# Patient Record
Sex: MALE | Race: WHITE
[De-identification: names, ages, dates, MRNs, and addresses within clinical notes are randomized per-mention and may not be internally consistent; named-entity substitution may affect disease eponyms.]

---

## 2018-06-13 ENCOUNTER — HOSPITAL ENCOUNTER (INPATIENT)
Dept: HOSPITAL 8 - ED | Age: 53
LOS: 2 days | Discharge: HOME | DRG: 917 | End: 2018-06-15
Attending: INTERNAL MEDICINE | Admitting: INTERNAL MEDICINE
Payer: COMMERCIAL

## 2018-06-13 VITALS — HEIGHT: 73 IN | WEIGHT: 162.48 LBS | BODY MASS INDEX: 21.53 KG/M2

## 2018-06-13 DIAGNOSIS — J96.01: ICD-10-CM

## 2018-06-13 DIAGNOSIS — R00.0: ICD-10-CM

## 2018-06-13 DIAGNOSIS — D72.829: ICD-10-CM

## 2018-06-13 DIAGNOSIS — G89.4: ICD-10-CM

## 2018-06-13 DIAGNOSIS — G92: ICD-10-CM

## 2018-06-13 DIAGNOSIS — F41.9: ICD-10-CM

## 2018-06-13 DIAGNOSIS — F11.20: ICD-10-CM

## 2018-06-13 DIAGNOSIS — F17.210: ICD-10-CM

## 2018-06-13 DIAGNOSIS — Z88.0: ICD-10-CM

## 2018-06-13 DIAGNOSIS — T40.2X1A: Primary | ICD-10-CM

## 2018-06-13 DIAGNOSIS — M54.5: ICD-10-CM

## 2018-06-13 LAB
ALBUMIN SERPL-MCNC: 3.7 G/DL (ref 3.4–5)
ALT SERPL-CCNC: 32 U/L (ref 12–78)
ANION GAP SERPL CALC-SCNC: 8 MMOL/L (ref 5–15)
BASOPHILS # BLD AUTO: 0.05 X10^3/UL (ref 0–0.1)
BASOPHILS NFR BLD AUTO: 0 % (ref 0–1)
CALCIUM SERPL-MCNC: 9.2 MG/DL (ref 8.5–10.1)
CHLORIDE SERPL-SCNC: 106 MMOL/L (ref 98–107)
CREAT SERPL-MCNC: 0.75 MG/DL (ref 0.7–1.3)
EOSINOPHIL # BLD AUTO: 0.08 X10^3/UL (ref 0–0.4)
EOSINOPHIL NFR BLD AUTO: 1 % (ref 1–7)
ERYTHROCYTE [DISTWIDTH] IN BLOOD BY AUTOMATED COUNT: 13 % (ref 9.4–14.8)
LYMPHOCYTES # BLD AUTO: 3.18 X10^3/UL (ref 1–3.4)
LYMPHOCYTES NFR BLD AUTO: 22 % (ref 22–44)
MCH RBC QN AUTO: 31.1 PG (ref 27.5–34.5)
MCHC RBC AUTO-ENTMCNC: 33.8 G/DL (ref 33.2–36.2)
MCV RBC AUTO: 92.1 FL (ref 81–97)
MD: NO
MONOCYTES # BLD AUTO: 0.79 X10^3/UL (ref 0.2–0.8)
MONOCYTES NFR BLD AUTO: 6 % (ref 2–9)
NEUTROPHILS # BLD AUTO: 10.16 X10^3/UL (ref 1.8–6.8)
NEUTROPHILS NFR BLD AUTO: 71 % (ref 42–75)
PLATELET # BLD AUTO: 210 X10^3/UL (ref 130–400)
PMV BLD AUTO: 7.5 FL (ref 7.4–10.4)
RBC # BLD AUTO: 4.71 X10^6/UL (ref 4.38–5.82)
VANCOMYCIN TROUGH SERPL-MCNC: 6.2 MG/DL (ref 2.8–20)

## 2018-06-13 PROCEDURE — 80329 ANALGESICS NON-OPIOID 1 OR 2: CPT

## 2018-06-13 PROCEDURE — 93005 ELECTROCARDIOGRAM TRACING: CPT

## 2018-06-13 PROCEDURE — 99291 CRITICAL CARE FIRST HOUR: CPT

## 2018-06-13 PROCEDURE — 82962 GLUCOSE BLOOD TEST: CPT

## 2018-06-13 PROCEDURE — 85025 COMPLETE CBC W/AUTO DIFF WBC: CPT

## 2018-06-13 PROCEDURE — 81003 URINALYSIS AUTO W/O SCOPE: CPT

## 2018-06-13 PROCEDURE — 87081 CULTURE SCREEN ONLY: CPT

## 2018-06-13 PROCEDURE — 71045 X-RAY EXAM CHEST 1 VIEW: CPT

## 2018-06-13 PROCEDURE — 80053 COMPREHEN METABOLIC PANEL: CPT

## 2018-06-13 PROCEDURE — 36415 COLL VENOUS BLD VENIPUNCTURE: CPT

## 2018-06-13 PROCEDURE — 80307 DRUG TEST PRSMV CHEM ANLYZR: CPT

## 2018-06-13 PROCEDURE — G0480 DRUG TEST DEF 1-7 CLASSES: HCPCS

## 2018-06-14 VITALS — DIASTOLIC BLOOD PRESSURE: 58 MMHG | SYSTOLIC BLOOD PRESSURE: 96 MMHG

## 2018-06-14 LAB
ALP SERPL-CCNC: 63 U/L (ref 45–117)
APAP SERPL-MCNC: < 2 MCG/ML (ref 10–30)
BILIRUB SERPL-MCNC: 0.3 MG/DL (ref 0.2–1)
CULTURE INDICATED?: NO
MICROSCOPIC: (no result)
PROT SERPL-MCNC: 6.7 G/DL (ref 6.4–8.2)

## 2018-06-14 RX ADMIN — NICOTINE SCH PATCH: 14 PATCH, EXTENDED RELEASE TRANSDERMAL at 23:01

## 2018-06-14 RX ADMIN — NICOTINE SCH PATCH: 14 PATCH, EXTENDED RELEASE TRANSDERMAL at 02:34

## 2018-06-15 VITALS — SYSTOLIC BLOOD PRESSURE: 135 MMHG | DIASTOLIC BLOOD PRESSURE: 75 MMHG

## 2018-06-15 VITALS — DIASTOLIC BLOOD PRESSURE: 62 MMHG | SYSTOLIC BLOOD PRESSURE: 125 MMHG

## 2018-06-15 VITALS — DIASTOLIC BLOOD PRESSURE: 67 MMHG | SYSTOLIC BLOOD PRESSURE: 146 MMHG

## 2018-06-15 VITALS — SYSTOLIC BLOOD PRESSURE: 125 MMHG | DIASTOLIC BLOOD PRESSURE: 83 MMHG

## 2018-06-15 LAB
ALBUMIN SERPL-MCNC: 3.6 G/DL (ref 3.4–5)
ALP SERPL-CCNC: 69 U/L (ref 45–117)
ALT SERPL-CCNC: 31 U/L (ref 12–78)
ANION GAP SERPL CALC-SCNC: 6 MMOL/L (ref 5–15)
BASOPHILS # BLD AUTO: 0.02 X10^3/UL (ref 0–0.1)
BASOPHILS NFR BLD AUTO: 0 % (ref 0–1)
BILIRUB SERPL-MCNC: 0.6 MG/DL (ref 0.2–1)
CALCIUM SERPL-MCNC: 9.1 MG/DL (ref 8.5–10.1)
CHLORIDE SERPL-SCNC: 110 MMOL/L (ref 98–107)
CREAT SERPL-MCNC: 0.62 MG/DL (ref 0.7–1.3)
EOSINOPHIL # BLD AUTO: 0.08 X10^3/UL (ref 0–0.4)
EOSINOPHIL NFR BLD AUTO: 1 % (ref 1–7)
ERYTHROCYTE [DISTWIDTH] IN BLOOD BY AUTOMATED COUNT: 13.2 % (ref 9.4–14.8)
LYMPHOCYTES # BLD AUTO: 2.93 X10^3/UL (ref 1–3.4)
LYMPHOCYTES NFR BLD AUTO: 40 % (ref 22–44)
MCH RBC QN AUTO: 31.2 PG (ref 27.5–34.5)
MCHC RBC AUTO-ENTMCNC: 33.6 G/DL (ref 33.2–36.2)
MCV RBC AUTO: 92.8 FL (ref 81–97)
MD: NO
MONOCYTES # BLD AUTO: 0.37 X10^3/UL (ref 0.2–0.8)
MONOCYTES NFR BLD AUTO: 5 % (ref 2–9)
NEUTROPHILS # BLD AUTO: 3.94 X10^3/UL (ref 1.8–6.8)
NEUTROPHILS NFR BLD AUTO: 54 % (ref 42–75)
PLATELET # BLD AUTO: 184 X10^3/UL (ref 130–400)
PMV BLD AUTO: 8.2 FL (ref 7.4–10.4)
PROT SERPL-MCNC: 6.6 G/DL (ref 6.4–8.2)
RBC # BLD AUTO: 4.76 X10^6/UL (ref 4.38–5.82)

## 2018-12-23 ENCOUNTER — HOSPITAL ENCOUNTER (INPATIENT)
Dept: HOSPITAL 8 - ED | Age: 53
LOS: 4 days | Discharge: HOME | DRG: 377 | End: 2018-12-27
Attending: HOSPITALIST | Admitting: HOSPITALIST
Payer: COMMERCIAL

## 2018-12-23 VITALS — BODY MASS INDEX: 19.65 KG/M2 | HEIGHT: 72 IN | WEIGHT: 145.06 LBS

## 2018-12-23 VITALS — SYSTOLIC BLOOD PRESSURE: 106 MMHG | DIASTOLIC BLOOD PRESSURE: 66 MMHG

## 2018-12-23 VITALS — DIASTOLIC BLOOD PRESSURE: 61 MMHG | SYSTOLIC BLOOD PRESSURE: 136 MMHG

## 2018-12-23 VITALS — SYSTOLIC BLOOD PRESSURE: 71 MMHG | DIASTOLIC BLOOD PRESSURE: 38 MMHG

## 2018-12-23 VITALS — DIASTOLIC BLOOD PRESSURE: 82 MMHG | SYSTOLIC BLOOD PRESSURE: 115 MMHG

## 2018-12-23 VITALS — DIASTOLIC BLOOD PRESSURE: 58 MMHG | SYSTOLIC BLOOD PRESSURE: 114 MMHG

## 2018-12-23 DIAGNOSIS — Z79.84: ICD-10-CM

## 2018-12-23 DIAGNOSIS — F17.210: ICD-10-CM

## 2018-12-23 DIAGNOSIS — G89.29: ICD-10-CM

## 2018-12-23 DIAGNOSIS — E87.6: ICD-10-CM

## 2018-12-23 DIAGNOSIS — Z78.9: ICD-10-CM

## 2018-12-23 DIAGNOSIS — R57.8: ICD-10-CM

## 2018-12-23 DIAGNOSIS — Z79.899: ICD-10-CM

## 2018-12-23 DIAGNOSIS — M54.9: ICD-10-CM
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
## 2018-12-23 DIAGNOSIS — F11.20: ICD-10-CM

## 2018-12-23 DIAGNOSIS — R57.1: ICD-10-CM

## 2018-12-23 DIAGNOSIS — Z82.3: ICD-10-CM

## 2018-12-23 DIAGNOSIS — E87.2: ICD-10-CM

## 2018-12-23 DIAGNOSIS — T39.395A: ICD-10-CM

## 2018-12-23 DIAGNOSIS — Z79.82: ICD-10-CM

## 2018-12-23 DIAGNOSIS — J44.9: ICD-10-CM

## 2018-12-23 DIAGNOSIS — K29.80: ICD-10-CM

## 2018-12-23 DIAGNOSIS — I10: ICD-10-CM

## 2018-12-23 DIAGNOSIS — K26.4: Primary | ICD-10-CM

## 2018-12-23 DIAGNOSIS — Z80.1: ICD-10-CM

## 2018-12-23 DIAGNOSIS — D62: ICD-10-CM

## 2018-12-23 DIAGNOSIS — E11.9: ICD-10-CM

## 2018-12-23 DIAGNOSIS — Z88.0: ICD-10-CM

## 2018-12-23 DIAGNOSIS — E43: ICD-10-CM

## 2018-12-23 DIAGNOSIS — E83.42: ICD-10-CM

## 2018-12-23 DIAGNOSIS — E78.5: ICD-10-CM

## 2018-12-23 LAB
ALBUMIN SERPL-MCNC: 2.9 G/DL (ref 3.4–5)
ALP SERPL-CCNC: 51 U/L (ref 45–117)
ALT SERPL-CCNC: 37 U/L (ref 12–78)
ANION GAP SERPL CALC-SCNC: 8 MMOL/L (ref 5–15)
APTT BLD: 25 SECONDS (ref 25–31)
BASOPHILS # BLD AUTO: 0.03 X10^3/UL (ref 0–0.1)
BASOPHILS NFR BLD AUTO: 0 % (ref 0–1)
BILIRUB SERPL-MCNC: 0.7 MG/DL (ref 0.2–1)
CALCIUM SERPL-MCNC: 8.1 MG/DL (ref 8.5–10.1)
CHLORIDE SERPL-SCNC: 107 MMOL/L (ref 98–107)
CLOSTRIDIUM DIFFICILE ANTIGEN: NEGATIVE
CLOSTRIDIUM DIFFICILE TOXIN: NEGATIVE
CREAT SERPL-MCNC: 0.74 MG/DL (ref 0.7–1.3)
CULTURE INDICATED?: NO
EOSINOPHIL # BLD AUTO: 0 X10^3/UL (ref 0–0.4)
EOSINOPHIL NFR BLD AUTO: 0 % (ref 1–7)
ERYTHROCYTE [DISTWIDTH] IN BLOOD BY AUTOMATED COUNT: 13.1 % (ref 9.4–14.8)
INR PPP: 1 (ref 0.93–1.1)
LYMPHOCYTES # BLD AUTO: 3.73 X10^3/UL (ref 1–3.4)
LYMPHOCYTES NFR BLD AUTO: 23 % (ref 22–44)
MCH RBC QN AUTO: 33 PG (ref 27.5–34.5)
MCHC RBC AUTO-ENTMCNC: 34.3 G/DL (ref 33.2–36.2)
MCV RBC AUTO: 96.3 FL (ref 81–97)
MD: NO
MICROSCOPIC: (no result)
MONOCYTES # BLD AUTO: 1.37 X10^3/UL (ref 0.2–0.8)
MONOCYTES NFR BLD AUTO: 8 % (ref 2–9)
NEUTROPHILS # BLD AUTO: 11.35 X10^3/UL (ref 1.8–6.8)
NEUTROPHILS NFR BLD AUTO: 69 % (ref 42–75)
PLATELET # BLD AUTO: 426 X10^3/UL (ref 130–400)
PMV BLD AUTO: 6.7 FL (ref 7.4–10.4)
PROT SERPL-MCNC: 5.2 G/DL (ref 6.4–8.2)
PROTHROMBIN TIME: 10.6 SECONDS (ref 9.6–11.5)
RBC # BLD AUTO: 3.45 X10^6/UL (ref 4.38–5.82)
TROPONIN I SERPL-MCNC: < 0.015 NG/ML (ref 0–0.04)

## 2018-12-23 PROCEDURE — 89055 LEUKOCYTE ASSESSMENT FECAL: CPT

## 2018-12-23 PROCEDURE — 85018 HEMOGLOBIN: CPT

## 2018-12-23 PROCEDURE — 96361 HYDRATE IV INFUSION ADD-ON: CPT

## 2018-12-23 PROCEDURE — 87324 CLOSTRIDIUM AG IA: CPT

## 2018-12-23 PROCEDURE — 71045 X-RAY EXAM CHEST 1 VIEW: CPT

## 2018-12-23 PROCEDURE — 83690 ASSAY OF LIPASE: CPT

## 2018-12-23 PROCEDURE — 93005 ELECTROCARDIOGRAM TRACING: CPT

## 2018-12-23 PROCEDURE — 0DJ08ZZ INSPECTION OF UPPER INTESTINAL TRACT, VIA NATURAL OR ARTIFICIAL OPENING ENDOSCOPIC: ICD-10-PCS | Performed by: INTERNAL MEDICINE

## 2018-12-23 PROCEDURE — 85025 COMPLETE CBC W/AUTO DIFF WBC: CPT

## 2018-12-23 PROCEDURE — 30233N1 TRANSFUSION OF NONAUTOLOGOUS RED BLOOD CELLS INTO PERIPHERAL VEIN, PERCUTANEOUS APPROACH: ICD-10-PCS | Performed by: INTERNAL MEDICINE

## 2018-12-23 PROCEDURE — P9016 RBC LEUKOCYTES REDUCED: HCPCS

## 2018-12-23 PROCEDURE — 87081 CULTURE SCREEN ONLY: CPT

## 2018-12-23 PROCEDURE — 96376 TX/PRO/DX INJ SAME DRUG ADON: CPT

## 2018-12-23 PROCEDURE — 36415 COLL VENOUS BLD VENIPUNCTURE: CPT

## 2018-12-23 PROCEDURE — 84484 ASSAY OF TROPONIN QUANT: CPT

## 2018-12-23 PROCEDURE — 86850 RBC ANTIBODY SCREEN: CPT

## 2018-12-23 PROCEDURE — 83735 ASSAY OF MAGNESIUM: CPT

## 2018-12-23 PROCEDURE — 81003 URINALYSIS AUTO W/O SCOPE: CPT

## 2018-12-23 PROCEDURE — 86900 BLOOD TYPING SEROLOGIC ABO: CPT

## 2018-12-23 PROCEDURE — 85730 THROMBOPLASTIN TIME PARTIAL: CPT

## 2018-12-23 PROCEDURE — 99285 EMERGENCY DEPT VISIT HI MDM: CPT

## 2018-12-23 PROCEDURE — 86923 COMPATIBILITY TEST ELECTRIC: CPT

## 2018-12-23 PROCEDURE — 86677 HELICOBACTER PYLORI ANTIBODY: CPT

## 2018-12-23 PROCEDURE — 85014 HEMATOCRIT: CPT

## 2018-12-23 PROCEDURE — 80048 BASIC METABOLIC PNL TOTAL CA: CPT

## 2018-12-23 PROCEDURE — 84100 ASSAY OF PHOSPHORUS: CPT

## 2018-12-23 PROCEDURE — C9113 INJ PANTOPRAZOLE SODIUM, VIA: HCPCS

## 2018-12-23 PROCEDURE — 36430 TRANSFUSION BLD/BLD COMPNT: CPT

## 2018-12-23 PROCEDURE — 85610 PROTHROMBIN TIME: CPT

## 2018-12-23 PROCEDURE — 74177 CT ABD & PELVIS W/CONTRAST: CPT

## 2018-12-23 PROCEDURE — 87040 BLOOD CULTURE FOR BACTERIA: CPT

## 2018-12-23 PROCEDURE — 80307 DRUG TEST PRSMV CHEM ANLYZR: CPT

## 2018-12-23 PROCEDURE — 80053 COMPREHEN METABOLIC PANEL: CPT

## 2018-12-23 PROCEDURE — 3E0G8GC INTRODUCTION OF OTHER THERAPEUTIC SUBSTANCE INTO UPPER GI, VIA NATURAL OR ARTIFICIAL OPENING ENDOSCOPIC: ICD-10-PCS | Performed by: INTERNAL MEDICINE

## 2018-12-23 PROCEDURE — 83605 ASSAY OF LACTIC ACID: CPT

## 2018-12-23 PROCEDURE — 96375 TX/PRO/DX INJ NEW DRUG ADDON: CPT

## 2018-12-23 PROCEDURE — 84145 PROCALCITONIN (PCT): CPT

## 2018-12-23 PROCEDURE — 96374 THER/PROPH/DIAG INJ IV PUSH: CPT

## 2018-12-23 RX ADMIN — MORPHINE SULFATE PRN MG: 4 INJECTION INTRAVENOUS at 14:48

## 2018-12-23 RX ADMIN — MORPHINE SULFATE PRN MG: 10 INJECTION INTRAVENOUS at 23:03

## 2018-12-23 RX ADMIN — PANTOPRAZOLE SODIUM SCH MLS/HR: 40 INJECTION, POWDER, FOR SOLUTION INTRAVENOUS at 19:38

## 2018-12-23 RX ADMIN — SODIUM CHLORIDE SCH MLS/HR: 0.9 INJECTION, SOLUTION INTRAVENOUS at 21:20

## 2018-12-23 RX ADMIN — MORPHINE SULFATE PRN MG: 4 INJECTION INTRAVENOUS at 20:34

## 2018-12-24 VITALS — DIASTOLIC BLOOD PRESSURE: 61 MMHG | SYSTOLIC BLOOD PRESSURE: 121 MMHG

## 2018-12-24 VITALS — SYSTOLIC BLOOD PRESSURE: 134 MMHG | DIASTOLIC BLOOD PRESSURE: 72 MMHG

## 2018-12-24 VITALS — SYSTOLIC BLOOD PRESSURE: 130 MMHG | DIASTOLIC BLOOD PRESSURE: 72 MMHG

## 2018-12-24 VITALS — DIASTOLIC BLOOD PRESSURE: 67 MMHG | SYSTOLIC BLOOD PRESSURE: 122 MMHG

## 2018-12-24 VITALS — SYSTOLIC BLOOD PRESSURE: 133 MMHG | DIASTOLIC BLOOD PRESSURE: 70 MMHG

## 2018-12-24 VITALS — SYSTOLIC BLOOD PRESSURE: 123 MMHG | DIASTOLIC BLOOD PRESSURE: 69 MMHG

## 2018-12-24 VITALS — DIASTOLIC BLOOD PRESSURE: 65 MMHG | SYSTOLIC BLOOD PRESSURE: 138 MMHG

## 2018-12-24 VITALS — DIASTOLIC BLOOD PRESSURE: 51 MMHG | SYSTOLIC BLOOD PRESSURE: 111 MMHG

## 2018-12-24 VITALS — SYSTOLIC BLOOD PRESSURE: 127 MMHG | DIASTOLIC BLOOD PRESSURE: 64 MMHG

## 2018-12-24 LAB
ALBUMIN SERPL-MCNC: 2.1 G/DL (ref 3.4–5)
ALP SERPL-CCNC: 29 U/L (ref 45–117)
ALT SERPL-CCNC: 20 U/L (ref 12–78)
ANION GAP SERPL CALC-SCNC: 6 MMOL/L (ref 5–15)
BASOPHILS # BLD AUTO: 0.02 X10^3/UL (ref 0–0.1)
BASOPHILS NFR BLD AUTO: 0 % (ref 0–1)
BILIRUB SERPL-MCNC: 0.5 MG/DL (ref 0.2–1)
CALCIUM SERPL-MCNC: 5.9 MG/DL (ref 8.5–10.1)
CHLORIDE SERPL-SCNC: 116 MMOL/L (ref 98–107)
CREAT SERPL-MCNC: 0.47 MG/DL (ref 0.7–1.3)
EOSINOPHIL # BLD AUTO: 0.19 X10^3/UL (ref 0–0.4)
EOSINOPHIL NFR BLD AUTO: 2 % (ref 1–7)
ERYTHROCYTE [DISTWIDTH] IN BLOOD BY AUTOMATED COUNT: 16 % (ref 9.4–14.8)
HEMOGRAM NOTE: (no result)
LYMPHOCYTES # BLD AUTO: 3.28 X10^3/UL (ref 1–3.4)
LYMPHOCYTES NFR BLD AUTO: 34 % (ref 22–44)
MCH RBC QN AUTO: 32 PG (ref 27.5–34.5)
MCHC RBC AUTO-ENTMCNC: 34.7 G/DL (ref 33.2–36.2)
MCV RBC AUTO: 92.2 FL (ref 81–97)
MD: (no result)
MONOCYTES # BLD AUTO: 0.83 X10^3/UL (ref 0.2–0.8)
MONOCYTES NFR BLD AUTO: 9 % (ref 2–9)
NEUTROPHILS # BLD AUTO: 5.28 X10^3/UL (ref 1.8–6.8)
NEUTROPHILS NFR BLD AUTO: 55 % (ref 42–75)
PLATELET # BLD AUTO: 201 X10^3/UL (ref 130–400)
PMV BLD AUTO: 6.9 FL (ref 7.4–10.4)
PROT SERPL-MCNC: 3.7 G/DL (ref 6.4–8.2)
RBC # BLD AUTO: 2.41 X10^6/UL (ref 4.38–5.82)

## 2018-12-24 RX ADMIN — PANTOPRAZOLE SODIUM SCH MLS/HR: 40 INJECTION, POWDER, FOR SOLUTION INTRAVENOUS at 04:58

## 2018-12-24 RX ADMIN — MORPHINE SULFATE PRN MG: 10 INJECTION INTRAVENOUS at 02:36

## 2018-12-24 RX ADMIN — OXYCODONE HYDROCHLORIDE PRN MG: 5 TABLET ORAL at 12:19

## 2018-12-24 RX ADMIN — OXYCODONE HYDROCHLORIDE PRN MG: 5 TABLET ORAL at 21:36

## 2018-12-24 RX ADMIN — MORPHINE SULFATE PRN MG: 10 INJECTION INTRAVENOUS at 09:07

## 2018-12-24 RX ADMIN — PANTOPRAZOLE SODIUM SCH MG: 40 INJECTION, POWDER, FOR SOLUTION INTRAVENOUS at 20:32

## 2018-12-24 RX ADMIN — SODIUM CHLORIDE SCH MLS/HR: 0.9 INJECTION, SOLUTION INTRAVENOUS at 11:14

## 2018-12-24 RX ADMIN — MORPHINE SULFATE PRN MG: 10 INJECTION INTRAVENOUS at 05:42

## 2018-12-24 RX ADMIN — OXYCODONE HYDROCHLORIDE PRN MG: 5 TABLET ORAL at 16:45

## 2018-12-24 RX ADMIN — OXYCODONE HYDROCHLORIDE PRN MG: 5 TABLET ORAL at 11:00

## 2018-12-24 RX ADMIN — SODIUM CHLORIDE SCH MLS/HR: 0.9 INJECTION, SOLUTION INTRAVENOUS at 03:50

## 2018-12-24 RX ADMIN — PANTOPRAZOLE SODIUM SCH MG: 40 INJECTION, POWDER, FOR SOLUTION INTRAVENOUS at 09:07

## 2018-12-25 VITALS — DIASTOLIC BLOOD PRESSURE: 74 MMHG | SYSTOLIC BLOOD PRESSURE: 145 MMHG

## 2018-12-25 VITALS — SYSTOLIC BLOOD PRESSURE: 106 MMHG | DIASTOLIC BLOOD PRESSURE: 48 MMHG

## 2018-12-25 LAB
ANION GAP SERPL CALC-SCNC: 8 MMOL/L (ref 5–15)
CALCIUM SERPL-MCNC: 6.9 MG/DL (ref 8.5–10.1)
CHLORIDE SERPL-SCNC: 111 MMOL/L (ref 98–107)
CREAT SERPL-MCNC: 0.34 MG/DL (ref 0.7–1.3)

## 2018-12-25 RX ADMIN — PANTOPRAZOLE SODIUM SCH MG: 40 INJECTION, POWDER, FOR SOLUTION INTRAVENOUS at 19:48

## 2018-12-25 RX ADMIN — SODIUM CHLORIDE SCH MLS/HR: 0.9 INJECTION, SOLUTION INTRAVENOUS at 00:03

## 2018-12-25 RX ADMIN — OXYCODONE HYDROCHLORIDE PRN MG: 5 TABLET ORAL at 02:06

## 2018-12-25 RX ADMIN — OXYCODONE HYDROCHLORIDE PRN MG: 5 TABLET ORAL at 06:29

## 2018-12-25 RX ADMIN — PANTOPRAZOLE SODIUM SCH MG: 40 INJECTION, POWDER, FOR SOLUTION INTRAVENOUS at 09:15

## 2018-12-26 VITALS — SYSTOLIC BLOOD PRESSURE: 151 MMHG | DIASTOLIC BLOOD PRESSURE: 73 MMHG

## 2018-12-26 VITALS — SYSTOLIC BLOOD PRESSURE: 148 MMHG | DIASTOLIC BLOOD PRESSURE: 67 MMHG

## 2018-12-26 VITALS — SYSTOLIC BLOOD PRESSURE: 146 MMHG | DIASTOLIC BLOOD PRESSURE: 74 MMHG

## 2018-12-26 VITALS — SYSTOLIC BLOOD PRESSURE: 157 MMHG | DIASTOLIC BLOOD PRESSURE: 69 MMHG

## 2018-12-26 LAB
ALBUMIN SERPL-MCNC: 2.7 G/DL (ref 3.4–5)
ALP SERPL-CCNC: 46 U/L (ref 45–117)
ALT SERPL-CCNC: 26 U/L (ref 12–78)
ANION GAP SERPL CALC-SCNC: 4 MMOL/L (ref 5–15)
BASOPHILS # BLD AUTO: 0.03 X10^3/UL (ref 0–0.1)
BASOPHILS NFR BLD AUTO: 0 % (ref 0–1)
BILIRUB SERPL-MCNC: 0.4 MG/DL (ref 0.2–1)
CALCIUM SERPL-MCNC: 7.3 MG/DL (ref 8.5–10.1)
CHLORIDE SERPL-SCNC: 110 MMOL/L (ref 98–107)
CREAT SERPL-MCNC: 0.59 MG/DL (ref 0.7–1.3)
EOSINOPHIL # BLD AUTO: 0.01 X10^3/UL (ref 0–0.4)
EOSINOPHIL NFR BLD AUTO: 0 % (ref 1–7)
ERYTHROCYTE [DISTWIDTH] IN BLOOD BY AUTOMATED COUNT: 15.5 % (ref 9.4–14.8)
LYMPHOCYTES # BLD AUTO: 1.88 X10^3/UL (ref 1–3.4)
LYMPHOCYTES NFR BLD AUTO: 24 % (ref 22–44)
MCH RBC QN AUTO: 32.1 PG (ref 27.5–34.5)
MCHC RBC AUTO-ENTMCNC: 34.5 G/DL (ref 33.2–36.2)
MCV RBC AUTO: 92.9 FL (ref 81–97)
MD: (no result)
MONOCYTES # BLD AUTO: 0.28 X10^3/UL (ref 0.2–0.8)
MONOCYTES NFR BLD AUTO: 4 % (ref 2–9)
NEUTROPHILS # BLD AUTO: 5.71 X10^3/UL (ref 1.8–6.8)
NEUTROPHILS NFR BLD AUTO: 72 % (ref 42–75)
PLATELET # BLD AUTO: 195 X10^3/UL (ref 130–400)
PMV BLD AUTO: 7.5 FL (ref 7.4–10.4)
PROT SERPL-MCNC: 4.9 G/DL (ref 6.4–8.2)
RBC # BLD AUTO: 3.13 X10^6/UL (ref 4.38–5.82)

## 2018-12-26 RX ADMIN — PANTOPRAZOLE SODIUM SCH MG: 40 INJECTION, POWDER, FOR SOLUTION INTRAVENOUS at 08:37

## 2018-12-26 RX ADMIN — SODIUM CHLORIDE SCH MLS/HR: 0.9 INJECTION, SOLUTION INTRAVENOUS at 03:51

## 2018-12-26 RX ADMIN — PANTOPRAZOLE SODIUM SCH MG: 40 INJECTION, POWDER, FOR SOLUTION INTRAVENOUS at 19:37

## 2018-12-26 RX ADMIN — SODIUM CHLORIDE SCH MLS/HR: 0.9 INJECTION, SOLUTION INTRAVENOUS at 19:38

## 2018-12-27 VITALS — DIASTOLIC BLOOD PRESSURE: 78 MMHG | SYSTOLIC BLOOD PRESSURE: 157 MMHG

## 2018-12-27 VITALS — SYSTOLIC BLOOD PRESSURE: 147 MMHG | DIASTOLIC BLOOD PRESSURE: 74 MMHG

## 2018-12-27 LAB
ALBUMIN SERPL-MCNC: 2.5 G/DL (ref 3.4–5)
ALP SERPL-CCNC: 42 U/L (ref 45–117)
ALT SERPL-CCNC: 25 U/L (ref 12–78)
ANION GAP SERPL CALC-SCNC: 4 MMOL/L (ref 5–15)
BASOPHILS # BLD AUTO: 0.02 X10^3/UL (ref 0–0.1)
BASOPHILS NFR BLD AUTO: 0 % (ref 0–1)
BILIRUB SERPL-MCNC: 0.4 MG/DL (ref 0.2–1)
CALCIUM SERPL-MCNC: 7.2 MG/DL (ref 8.5–10.1)
CHLORIDE SERPL-SCNC: 114 MMOL/L (ref 98–107)
CREAT SERPL-MCNC: 0.63 MG/DL (ref 0.7–1.3)
EOSINOPHIL # BLD AUTO: 0.07 X10^3/UL (ref 0–0.4)
EOSINOPHIL NFR BLD AUTO: 1 % (ref 1–7)
ERYTHROCYTE [DISTWIDTH] IN BLOOD BY AUTOMATED COUNT: 15.7 % (ref 9.4–14.8)
LYMPHOCYTES # BLD AUTO: 1.97 X10^3/UL (ref 1–3.4)
LYMPHOCYTES NFR BLD AUTO: 30 % (ref 22–44)
MCH RBC QN AUTO: 32.8 PG (ref 27.5–34.5)
MCHC RBC AUTO-ENTMCNC: 35.3 G/DL (ref 33.2–36.2)
MCV RBC AUTO: 92.8 FL (ref 81–97)
MD: NO
MONOCYTES # BLD AUTO: 0.27 X10^3/UL (ref 0.2–0.8)
MONOCYTES NFR BLD AUTO: 4 % (ref 2–9)
NEUTROPHILS # BLD AUTO: 4.25 X10^3/UL (ref 1.8–6.8)
NEUTROPHILS NFR BLD AUTO: 65 % (ref 42–75)
PLATELET # BLD AUTO: 187 X10^3/UL (ref 130–400)
PMV BLD AUTO: 7.4 FL (ref 7.4–10.4)
PROT SERPL-MCNC: 4.4 G/DL (ref 6.4–8.2)
RBC # BLD AUTO: 2.84 X10^6/UL (ref 4.38–5.82)

## 2018-12-27 RX ADMIN — DIBASIC SODIUM PHOSPHATE, MONOBASIC POTASSIUM PHOSPHATE AND MONOBASIC SODIUM PHOSPHATE SCH MG: 852; 155; 130 TABLET ORAL at 08:33

## 2018-12-27 RX ADMIN — SODIUM CHLORIDE SCH MLS/HR: 0.9 INJECTION, SOLUTION INTRAVENOUS at 08:35

## 2018-12-27 RX ADMIN — DIBASIC SODIUM PHOSPHATE, MONOBASIC POTASSIUM PHOSPHATE AND MONOBASIC SODIUM PHOSPHATE SCH MG: 852; 155; 130 TABLET ORAL at 08:35

## 2018-12-27 RX ADMIN — PANTOPRAZOLE SODIUM SCH MG: 40 INJECTION, POWDER, FOR SOLUTION INTRAVENOUS at 08:33

## 2018-12-27 RX ADMIN — AMLODIPINE BESYLATE SCH MG: 5 TABLET ORAL at 08:33

## 2018-12-27 RX ADMIN — AMLODIPINE BESYLATE SCH MG: 5 TABLET ORAL at 08:40

## 2020-07-01 ENCOUNTER — HOSPITAL ENCOUNTER (OUTPATIENT)
Dept: RADIOLOGY | Facility: MEDICAL CENTER | Age: 55
End: 2020-07-01
Payer: MEDICAID

## 2020-07-01 ENCOUNTER — APPOINTMENT (OUTPATIENT)
Dept: RADIOLOGY | Facility: MEDICAL CENTER | Age: 55
DRG: 862 | End: 2020-07-01
Attending: INTERNAL MEDICINE
Payer: MEDICAID

## 2020-07-01 ENCOUNTER — HOSPITAL ENCOUNTER (OUTPATIENT)
Facility: MEDICAL CENTER | Age: 55
End: 2020-07-01
Payer: MEDICAID

## 2020-07-01 ENCOUNTER — HOSPITAL ENCOUNTER (INPATIENT)
Facility: MEDICAL CENTER | Age: 55
LOS: 5 days | DRG: 862 | End: 2020-07-06
Attending: EMERGENCY MEDICINE | Admitting: INTERNAL MEDICINE
Payer: MEDICAID

## 2020-07-01 DIAGNOSIS — K85.90 ACUTE PANCREATITIS, UNSPECIFIED COMPLICATION STATUS, UNSPECIFIED PANCREATITIS TYPE: ICD-10-CM

## 2020-07-01 DIAGNOSIS — R74.01 TRANSAMINITIS: ICD-10-CM

## 2020-07-01 DIAGNOSIS — R74.8 ELEVATED LIVER ENZYMES: ICD-10-CM

## 2020-07-01 DIAGNOSIS — R10.9 ABDOMINAL PAIN, UNSPECIFIED ABDOMINAL LOCATION: ICD-10-CM

## 2020-07-01 PROBLEM — N40.0 BPH (BENIGN PROSTATIC HYPERPLASIA): Status: ACTIVE | Noted: 2020-07-01

## 2020-07-01 PROBLEM — E78.5 DYSLIPIDEMIA: Status: ACTIVE | Noted: 2020-07-01

## 2020-07-01 PROBLEM — D64.9 NORMOCYTIC ANEMIA: Status: ACTIVE | Noted: 2020-07-01

## 2020-07-01 PROBLEM — R73.9 HYPERGLYCEMIA: Status: ACTIVE | Noted: 2020-07-01

## 2020-07-01 LAB
ALBUMIN SERPL BCP-MCNC: 3.5 G/DL (ref 3.2–4.9)
ALBUMIN/GLOB SERPL: 1 G/DL
ALP SERPL-CCNC: 1406 U/L (ref 30–99)
ALT SERPL-CCNC: 243 U/L (ref 2–50)
ANION GAP SERPL CALC-SCNC: 13 MMOL/L (ref 7–16)
APTT PPP: 26.6 SEC (ref 24.7–36)
AST SERPL-CCNC: 406 U/L (ref 12–45)
BASOPHILS # BLD AUTO: 0.4 % (ref 0–1.8)
BASOPHILS # BLD: 0.03 K/UL (ref 0–0.12)
BILIRUB SERPL-MCNC: 0.7 MG/DL (ref 0.1–1.5)
BUN SERPL-MCNC: 15 MG/DL (ref 8–22)
CALCIUM SERPL-MCNC: 9.4 MG/DL (ref 8.4–10.2)
CHLORIDE SERPL-SCNC: 96 MMOL/L (ref 96–112)
CHOLEST SERPL-MCNC: 123 MG/DL (ref 100–199)
CO2 SERPL-SCNC: 23 MMOL/L (ref 20–33)
COVID ORDER STATUS COVID19: NORMAL
CREAT SERPL-MCNC: 0.58 MG/DL (ref 0.5–1.4)
EOSINOPHIL # BLD AUTO: 0.1 K/UL (ref 0–0.51)
EOSINOPHIL NFR BLD: 1.4 % (ref 0–6.9)
ERYTHROCYTE [DISTWIDTH] IN BLOOD BY AUTOMATED COUNT: 43.2 FL (ref 35.9–50)
GLOBULIN SER CALC-MCNC: 3.5 G/DL (ref 1.9–3.5)
GLUCOSE BLD-MCNC: 137 MG/DL (ref 65–99)
GLUCOSE BLD-MCNC: 155 MG/DL (ref 65–99)
GLUCOSE BLD-MCNC: 174 MG/DL (ref 65–99)
GLUCOSE SERPL-MCNC: 192 MG/DL (ref 65–99)
HAV IGM SERPL QL IA: NORMAL
HBV CORE IGM SER QL: NORMAL
HBV SURFACE AG SER QL: NORMAL
HCT VFR BLD AUTO: 34.5 % (ref 42–52)
HCV AB SER QL: NORMAL
HDLC SERPL-MCNC: 34 MG/DL
HGB BLD-MCNC: 10.9 G/DL (ref 14–18)
HGB BLD-MCNC: 9.4 G/DL (ref 14–18)
HGB BLD-MCNC: 9.6 G/DL (ref 14–18)
IMM GRANULOCYTES # BLD AUTO: 0.05 K/UL (ref 0–0.11)
IMM GRANULOCYTES NFR BLD AUTO: 0.7 % (ref 0–0.9)
INR PPP: 1.04 (ref 0.87–1.13)
LDLC SERPL CALC-MCNC: 60 MG/DL
LIPASE SERPL-CCNC: 615 U/L (ref 7–58)
LYMPHOCYTES # BLD AUTO: 2.38 K/UL (ref 1–4.8)
LYMPHOCYTES NFR BLD: 32.7 % (ref 22–41)
MCH RBC QN AUTO: 27.3 PG (ref 27–33)
MCHC RBC AUTO-ENTMCNC: 31.6 G/DL (ref 33.7–35.3)
MCV RBC AUTO: 86.5 FL (ref 81.4–97.8)
MONOCYTES # BLD AUTO: 0.8 K/UL (ref 0–0.85)
MONOCYTES NFR BLD AUTO: 11 % (ref 0–13.4)
NEUTROPHILS # BLD AUTO: 3.92 K/UL (ref 1.82–7.42)
NEUTROPHILS NFR BLD: 53.8 % (ref 44–72)
NRBC # BLD AUTO: 0 K/UL
NRBC BLD-RTO: 0 /100 WBC
PLATELET # BLD AUTO: 312 K/UL (ref 164–446)
PMV BLD AUTO: 9.4 FL (ref 9–12.9)
POTASSIUM SERPL-SCNC: 4.1 MMOL/L (ref 3.6–5.5)
PROT SERPL-MCNC: 7 G/DL (ref 6–8.2)
PROTHROMBIN TIME: 13.7 SEC (ref 12–14.6)
RBC # BLD AUTO: 3.99 M/UL (ref 4.7–6.1)
SARS-COV-2 RNA RESP QL NAA+PROBE: NOTDETECTED
SODIUM SERPL-SCNC: 132 MMOL/L (ref 135–145)
SPECIMEN SOURCE: NORMAL
TRIGL SERPL-MCNC: 146 MG/DL (ref 0–149)
WBC # BLD AUTO: 7.3 K/UL (ref 4.8–10.8)

## 2020-07-01 PROCEDURE — 87186 SC STD MICRODIL/AGAR DIL: CPT

## 2020-07-01 PROCEDURE — 36415 COLL VENOUS BLD VENIPUNCTURE: CPT

## 2020-07-01 PROCEDURE — 80053 COMPREHEN METABOLIC PANEL: CPT

## 2020-07-01 PROCEDURE — 700102 HCHG RX REV CODE 250 W/ 637 OVERRIDE(OP): Performed by: INTERNAL MEDICINE

## 2020-07-01 PROCEDURE — 87077 CULTURE AEROBIC IDENTIFY: CPT | Mod: 91

## 2020-07-01 PROCEDURE — 700111 HCHG RX REV CODE 636 W/ 250 OVERRIDE (IP): Performed by: INTERNAL MEDICINE

## 2020-07-01 PROCEDURE — 85610 PROTHROMBIN TIME: CPT

## 2020-07-01 PROCEDURE — 85730 THROMBOPLASTIN TIME PARTIAL: CPT

## 2020-07-01 PROCEDURE — 80074 ACUTE HEPATITIS PANEL: CPT

## 2020-07-01 PROCEDURE — 80061 LIPID PANEL: CPT

## 2020-07-01 PROCEDURE — 87070 CULTURE OTHR SPECIMN AEROBIC: CPT

## 2020-07-01 PROCEDURE — 700111 HCHG RX REV CODE 636 W/ 250 OVERRIDE (IP): Performed by: RADIOLOGY

## 2020-07-01 PROCEDURE — 99223 1ST HOSP IP/OBS HIGH 75: CPT | Performed by: INTERNAL MEDICINE

## 2020-07-01 PROCEDURE — 700111 HCHG RX REV CODE 636 W/ 250 OVERRIDE (IP): Performed by: EMERGENCY MEDICINE

## 2020-07-01 PROCEDURE — 700105 HCHG RX REV CODE 258: Performed by: INTERNAL MEDICINE

## 2020-07-01 PROCEDURE — 770006 HCHG ROOM/CARE - MED/SURG/GYN SEMI*

## 2020-07-01 PROCEDURE — 700101 HCHG RX REV CODE 250: Performed by: INTERNAL MEDICINE

## 2020-07-01 PROCEDURE — U0004 COV-19 TEST NON-CDC HGH THRU: HCPCS

## 2020-07-01 PROCEDURE — 99153 MOD SED SAME PHYS/QHP EA: CPT

## 2020-07-01 PROCEDURE — 87205 SMEAR GRAM STAIN: CPT

## 2020-07-01 PROCEDURE — A9270 NON-COVERED ITEM OR SERVICE: HCPCS | Performed by: RADIOLOGY

## 2020-07-01 PROCEDURE — A9270 NON-COVERED ITEM OR SERVICE: HCPCS | Performed by: INTERNAL MEDICINE

## 2020-07-01 PROCEDURE — 85018 HEMOGLOBIN: CPT

## 2020-07-01 PROCEDURE — 700102 HCHG RX REV CODE 250 W/ 637 OVERRIDE(OP): Performed by: RADIOLOGY

## 2020-07-01 PROCEDURE — 83690 ASSAY OF LIPASE: CPT

## 2020-07-01 PROCEDURE — 82962 GLUCOSE BLOOD TEST: CPT

## 2020-07-01 PROCEDURE — 0W9J30Z DRAINAGE OF PELVIC CAVITY WITH DRAINAGE DEVICE, PERCUTANEOUS APPROACH: ICD-10-PCS | Performed by: RADIOLOGY

## 2020-07-01 PROCEDURE — 85025 COMPLETE CBC W/AUTO DIFF WBC: CPT

## 2020-07-01 RX ORDER — DIAZEPAM 10 MG/1
10 TABLET ORAL EVERY 6 HOURS PRN
COMMUNITY

## 2020-07-01 RX ORDER — SODIUM CHLORIDE, SODIUM LACTATE, POTASSIUM CHLORIDE, CALCIUM CHLORIDE 600; 310; 30; 20 MG/100ML; MG/100ML; MG/100ML; MG/100ML
INJECTION, SOLUTION INTRAVENOUS CONTINUOUS
Status: DISCONTINUED | OUTPATIENT
Start: 2020-07-01 | End: 2020-07-06 | Stop reason: HOSPADM

## 2020-07-01 RX ORDER — LISINOPRIL 10 MG/1
10 TABLET ORAL DAILY
COMMUNITY
End: 2020-08-04

## 2020-07-01 RX ORDER — ONDANSETRON 2 MG/ML
4 INJECTION INTRAMUSCULAR; INTRAVENOUS PRN
Status: ACTIVE | OUTPATIENT
Start: 2020-07-01 | End: 2020-07-01

## 2020-07-01 RX ORDER — MORPHINE SULFATE 4 MG/ML
4 INJECTION, SOLUTION INTRAMUSCULAR; INTRAVENOUS ONCE
Status: COMPLETED | OUTPATIENT
Start: 2020-07-01 | End: 2020-07-01

## 2020-07-01 RX ORDER — PROMETHAZINE HYDROCHLORIDE 25 MG/1
12.5-25 SUPPOSITORY RECTAL EVERY 4 HOURS PRN
Status: DISCONTINUED | OUTPATIENT
Start: 2020-07-01 | End: 2020-07-06 | Stop reason: HOSPADM

## 2020-07-01 RX ORDER — ZOLPIDEM TARTRATE 5 MG/1
2.5 TABLET ORAL NIGHTLY PRN
COMMUNITY

## 2020-07-01 RX ORDER — OXYCODONE AND ACETAMINOPHEN 10; 325 MG/1; MG/1
.5-1 TABLET ORAL EVERY 6 HOURS PRN
COMMUNITY

## 2020-07-01 RX ORDER — DIAZEPAM 5 MG/1
10 TABLET ORAL EVERY 6 HOURS PRN
Status: DISCONTINUED | OUTPATIENT
Start: 2020-07-01 | End: 2020-07-06 | Stop reason: HOSPADM

## 2020-07-01 RX ORDER — MIDAZOLAM HYDROCHLORIDE 1 MG/ML
.5-2 INJECTION INTRAMUSCULAR; INTRAVENOUS PRN
Status: ACTIVE | OUTPATIENT
Start: 2020-07-01 | End: 2020-07-01

## 2020-07-01 RX ORDER — M-VIT,TX,IRON,MINS/CALC/FOLIC 27MG-0.4MG
1 TABLET ORAL DAILY
COMMUNITY

## 2020-07-01 RX ORDER — ACETAMINOPHEN 500 MG
1000 TABLET ORAL EVERY 6 HOURS
Status: COMPLETED | OUTPATIENT
Start: 2020-07-01 | End: 2020-07-02

## 2020-07-01 RX ORDER — ACETAMINOPHEN 325 MG/1
650 TABLET ORAL EVERY 6 HOURS PRN
Status: DISCONTINUED | OUTPATIENT
Start: 2020-07-01 | End: 2020-07-05

## 2020-07-01 RX ORDER — MIDAZOLAM HYDROCHLORIDE 1 MG/ML
INJECTION INTRAMUSCULAR; INTRAVENOUS
Status: COMPLETED
Start: 2020-07-01 | End: 2020-07-01

## 2020-07-01 RX ORDER — SODIUM CHLORIDE 9 MG/ML
500 INJECTION, SOLUTION INTRAVENOUS
Status: ACTIVE | OUTPATIENT
Start: 2020-07-01 | End: 2020-07-01

## 2020-07-01 RX ORDER — OXYCODONE HYDROCHLORIDE 5 MG/1
5 TABLET ORAL
Status: DISPENSED | OUTPATIENT
Start: 2020-07-01 | End: 2020-07-02

## 2020-07-01 RX ORDER — HYDROMORPHONE HYDROCHLORIDE 1 MG/ML
0.5 INJECTION, SOLUTION INTRAMUSCULAR; INTRAVENOUS; SUBCUTANEOUS
Status: DISCONTINUED | OUTPATIENT
Start: 2020-07-01 | End: 2020-07-06 | Stop reason: HOSPADM

## 2020-07-01 RX ORDER — OXYCODONE HYDROCHLORIDE 5 MG/1
5 TABLET ORAL
Status: DISCONTINUED | OUTPATIENT
Start: 2020-07-01 | End: 2020-07-06 | Stop reason: HOSPADM

## 2020-07-01 RX ORDER — ONDANSETRON 2 MG/ML
4 INJECTION INTRAMUSCULAR; INTRAVENOUS EVERY 4 HOURS PRN
Status: DISCONTINUED | OUTPATIENT
Start: 2020-07-01 | End: 2020-07-06 | Stop reason: HOSPADM

## 2020-07-01 RX ORDER — BISACODYL 10 MG
10 SUPPOSITORY, RECTAL RECTAL
Status: DISCONTINUED | OUTPATIENT
Start: 2020-07-01 | End: 2020-07-06 | Stop reason: HOSPADM

## 2020-07-01 RX ORDER — POLYETHYLENE GLYCOL 3350 17 G/17G
1 POWDER, FOR SOLUTION ORAL
Status: DISCONTINUED | OUTPATIENT
Start: 2020-07-01 | End: 2020-07-06 | Stop reason: HOSPADM

## 2020-07-01 RX ORDER — FINASTERIDE 5 MG/1
5 TABLET, FILM COATED ORAL DAILY
Status: DISCONTINUED | OUTPATIENT
Start: 2020-07-01 | End: 2020-07-06 | Stop reason: HOSPADM

## 2020-07-01 RX ORDER — ATORVASTATIN CALCIUM 20 MG/1
20 TABLET, FILM COATED ORAL NIGHTLY
COMMUNITY
End: 2021-12-28 | Stop reason: SDUPTHER

## 2020-07-01 RX ORDER — AMOXICILLIN 250 MG
2 CAPSULE ORAL 2 TIMES DAILY
Status: DISCONTINUED | OUTPATIENT
Start: 2020-07-01 | End: 2020-07-06 | Stop reason: HOSPADM

## 2020-07-01 RX ORDER — ONDANSETRON 2 MG/ML
4 INJECTION INTRAMUSCULAR; INTRAVENOUS ONCE
Status: COMPLETED | OUTPATIENT
Start: 2020-07-01 | End: 2020-07-01

## 2020-07-01 RX ORDER — ZOLPIDEM TARTRATE 5 MG/1
2.5 TABLET ORAL
Status: DISCONTINUED | OUTPATIENT
Start: 2020-07-01 | End: 2020-07-06 | Stop reason: HOSPADM

## 2020-07-01 RX ORDER — PROCHLORPERAZINE EDISYLATE 5 MG/ML
5-10 INJECTION INTRAMUSCULAR; INTRAVENOUS EVERY 4 HOURS PRN
Status: DISCONTINUED | OUTPATIENT
Start: 2020-07-01 | End: 2020-07-06 | Stop reason: HOSPADM

## 2020-07-01 RX ORDER — OXYCODONE HYDROCHLORIDE 10 MG/1
10 TABLET ORAL
Status: DISCONTINUED | OUTPATIENT
Start: 2020-07-01 | End: 2020-07-06 | Stop reason: HOSPADM

## 2020-07-01 RX ORDER — ATORVASTATIN CALCIUM 20 MG/1
20 TABLET, FILM COATED ORAL NIGHTLY
Status: DISCONTINUED | OUTPATIENT
Start: 2020-07-01 | End: 2020-07-05

## 2020-07-01 RX ORDER — ONDANSETRON 4 MG/1
4 TABLET, ORALLY DISINTEGRATING ORAL EVERY 4 HOURS PRN
Status: DISCONTINUED | OUTPATIENT
Start: 2020-07-01 | End: 2020-07-06 | Stop reason: HOSPADM

## 2020-07-01 RX ORDER — OXYCODONE HYDROCHLORIDE 10 MG/1
10 TABLET ORAL
Status: DISPENSED | OUTPATIENT
Start: 2020-07-01 | End: 2020-07-02

## 2020-07-01 RX ORDER — METHOCARBAMOL 500 MG/1
750 TABLET, FILM COATED ORAL 2 TIMES DAILY PRN
Status: DISCONTINUED | OUTPATIENT
Start: 2020-07-01 | End: 2020-07-06 | Stop reason: HOSPADM

## 2020-07-01 RX ORDER — METHOCARBAMOL 750 MG/1
750 TABLET, FILM COATED ORAL 2 TIMES DAILY PRN
COMMUNITY
End: 2021-10-15

## 2020-07-01 RX ORDER — HYDROMORPHONE HYDROCHLORIDE 1 MG/ML
0.5 INJECTION, SOLUTION INTRAMUSCULAR; INTRAVENOUS; SUBCUTANEOUS ONCE
Status: COMPLETED | OUTPATIENT
Start: 2020-07-01 | End: 2020-07-01

## 2020-07-01 RX ORDER — FINASTERIDE 5 MG/1
5 TABLET, FILM COATED ORAL EVERY EVENING
COMMUNITY

## 2020-07-01 RX ORDER — LISINOPRIL 20 MG/1
40 TABLET ORAL DAILY
Status: DISCONTINUED | OUTPATIENT
Start: 2020-07-01 | End: 2020-07-06 | Stop reason: HOSPADM

## 2020-07-01 RX ORDER — ENALAPRILAT 1.25 MG/ML
1.25 INJECTION INTRAVENOUS EVERY 6 HOURS PRN
Status: DISCONTINUED | OUTPATIENT
Start: 2020-07-01 | End: 2020-07-06 | Stop reason: HOSPADM

## 2020-07-01 RX ORDER — PROMETHAZINE HYDROCHLORIDE 25 MG/1
12.5-25 TABLET ORAL EVERY 4 HOURS PRN
Status: DISCONTINUED | OUTPATIENT
Start: 2020-07-01 | End: 2020-07-06 | Stop reason: HOSPADM

## 2020-07-01 RX ADMIN — MIDAZOLAM HYDROCHLORIDE 1 MG: 1 INJECTION, SOLUTION INTRAMUSCULAR; INTRAVENOUS at 17:21

## 2020-07-01 RX ADMIN — ATORVASTATIN CALCIUM 20 MG: 10 TABLET, FILM COATED ORAL at 20:23

## 2020-07-01 RX ADMIN — SODIUM CHLORIDE, POTASSIUM CHLORIDE, SODIUM LACTATE AND CALCIUM CHLORIDE: 600; 310; 30; 20 INJECTION, SOLUTION INTRAVENOUS at 05:06

## 2020-07-01 RX ADMIN — CEFTRIAXONE SODIUM 2 G: 2 INJECTION, POWDER, FOR SOLUTION INTRAMUSCULAR; INTRAVENOUS at 07:01

## 2020-07-01 RX ADMIN — FENTANYL CITRATE 50 MCG: 50 INJECTION, SOLUTION INTRAMUSCULAR; INTRAVENOUS at 17:18

## 2020-07-01 RX ADMIN — ACETAMINOPHEN 1000 MG: 500 TABLET, FILM COATED ORAL at 18:26

## 2020-07-01 RX ADMIN — HYDROMORPHONE HYDROCHLORIDE 0.5 MG: 1 INJECTION, SOLUTION INTRAMUSCULAR; INTRAVENOUS; SUBCUTANEOUS at 07:49

## 2020-07-01 RX ADMIN — HYDROMORPHONE HYDROCHLORIDE 0.5 MG: 1 INJECTION, SOLUTION INTRAMUSCULAR; INTRAVENOUS; SUBCUTANEOUS at 10:48

## 2020-07-01 RX ADMIN — HYDROMORPHONE HYDROCHLORIDE 0.5 MG: 1 INJECTION, SOLUTION INTRAMUSCULAR; INTRAVENOUS; SUBCUTANEOUS at 07:01

## 2020-07-01 RX ADMIN — FINASTERIDE 5 MG: 5 TABLET, FILM COATED ORAL at 05:06

## 2020-07-01 RX ADMIN — ACETAMINOPHEN 1000 MG: 500 TABLET, FILM COATED ORAL at 23:46

## 2020-07-01 RX ADMIN — HYDROMORPHONE HYDROCHLORIDE 0.5 MG: 1 INJECTION, SOLUTION INTRAMUSCULAR; INTRAVENOUS; SUBCUTANEOUS at 04:55

## 2020-07-01 RX ADMIN — MORPHINE SULFATE 4 MG: 4 INJECTION INTRAVENOUS at 03:16

## 2020-07-01 RX ADMIN — HYDROMORPHONE HYDROCHLORIDE 0.5 MG: 1 INJECTION, SOLUTION INTRAMUSCULAR; INTRAVENOUS; SUBCUTANEOUS at 13:45

## 2020-07-01 RX ADMIN — ATORVASTATIN CALCIUM 20 MG: 10 TABLET, FILM COATED ORAL at 05:06

## 2020-07-01 RX ADMIN — MIDAZOLAM HYDROCHLORIDE 1 MG: 1 INJECTION, SOLUTION INTRAMUSCULAR; INTRAVENOUS at 17:23

## 2020-07-01 RX ADMIN — METRONIDAZOLE 500 MG: 500 INJECTION, SOLUTION INTRAVENOUS at 07:49

## 2020-07-01 RX ADMIN — LISINOPRIL 40 MG: 20 TABLET ORAL at 05:07

## 2020-07-01 RX ADMIN — ONDANSETRON 4 MG: 2 INJECTION INTRAMUSCULAR; INTRAVENOUS at 03:16

## 2020-07-01 RX ADMIN — OXYCODONE HYDROCHLORIDE 10 MG: 10 TABLET ORAL at 22:12

## 2020-07-01 RX ADMIN — SODIUM CHLORIDE, POTASSIUM CHLORIDE, SODIUM LACTATE AND CALCIUM CHLORIDE: 600; 310; 30; 20 INJECTION, SOLUTION INTRAVENOUS at 20:16

## 2020-07-01 RX ADMIN — FENTANYL CITRATE 25 MCG: 50 INJECTION, SOLUTION INTRAMUSCULAR; INTRAVENOUS at 17:21

## 2020-07-01 RX ADMIN — MIDAZOLAM HYDROCHLORIDE 1 MG: 1 INJECTION, SOLUTION INTRAMUSCULAR; INTRAVENOUS at 17:18

## 2020-07-01 RX ADMIN — OXYCODONE HYDROCHLORIDE 10 MG: 10 TABLET ORAL at 18:26

## 2020-07-01 RX ADMIN — METRONIDAZOLE 500 MG: 500 INJECTION, SOLUTION INTRAVENOUS at 13:45

## 2020-07-01 ASSESSMENT — ENCOUNTER SYMPTOMS
NAUSEA: 0
SHORTNESS OF BREATH: 0
DIZZINESS: 0
CHILLS: 0
DEPRESSION: 0
HEADACHES: 0
FALLS: 0
TINGLING: 0
LOSS OF CONSCIOUSNESS: 0
STRIDOR: 0
DIARRHEA: 0
CONSTIPATION: 0
VOMITING: 0
WEAKNESS: 0
MYALGIAS: 0
BLOOD IN STOOL: 1
FEVER: 0
ABDOMINAL PAIN: 1
PALPITATIONS: 0
COUGH: 0
SPUTUM PRODUCTION: 0

## 2020-07-01 ASSESSMENT — PATIENT HEALTH QUESTIONNAIRE - PHQ9
SUM OF ALL RESPONSES TO PHQ9 QUESTIONS 1 AND 2: 0
1. LITTLE INTEREST OR PLEASURE IN DOING THINGS: NOT AT ALL
2. FEELING DOWN, DEPRESSED, IRRITABLE, OR HOPELESS: NOT AT ALL

## 2020-07-01 ASSESSMENT — LIFESTYLE VARIABLES
EVER FELT BAD OR GUILTY ABOUT YOUR DRINKING: NO
TOTAL SCORE: 0
ALCOHOL_USE: NO
HAVE YOU EVER FELT YOU SHOULD CUT DOWN ON YOUR DRINKING: NO
TOTAL SCORE: 0
AVERAGE NUMBER OF DAYS PER WEEK YOU HAVE A DRINK CONTAINING ALCOHOL: 0
EVER HAD A DRINK FIRST THING IN THE MORNING TO STEADY YOUR NERVES TO GET RID OF A HANGOVER: NO
CONSUMPTION TOTAL: NEGATIVE
HAVE PEOPLE ANNOYED YOU BY CRITICIZING YOUR DRINKING: NO
EVER_SMOKED: YES
TOTAL SCORE: 0
ON A TYPICAL DAY WHEN YOU DRINK ALCOHOL HOW MANY DRINKS DO YOU HAVE: 0
HOW MANY TIMES IN THE PAST YEAR HAVE YOU HAD 5 OR MORE DRINKS IN A DAY: 0

## 2020-07-01 ASSESSMENT — FIBROSIS 4 INDEX: FIB4 SCORE: 4.67

## 2020-07-01 ASSESSMENT — PAIN DESCRIPTION - DESCRIPTORS: DESCRIPTORS: SHARP

## 2020-07-01 NOTE — ASSESSMENT & PLAN NOTE
? Hemorrhoidal bleed  -3 episodes of bright red blood per rectum prior to ED visit  -hemoglobin stable even with IV hydration  -Patient is hemodynamically stable  No further episodes of bleeding since transfer here.

## 2020-07-01 NOTE — PROGRESS NOTES
Dr Marsh notified of BP 85/46, HR 59.  Patient denies dizziness, lightheadedness, Nausea.  Continue with current plan of care.

## 2020-07-01 NOTE — ASSESSMENT & PLAN NOTE
-Currently with no sign of gross bleeding however he has not had another bowel movement  -Frequent hemoglobins

## 2020-07-01 NOTE — PROGRESS NOTES
A&Ox4, complaints of abdominal pain, medicated, void x1.   NPO, mouth swabs and tooth brush provide.  Oral care encouraged.  Updated on plan of care.

## 2020-07-01 NOTE — PROGRESS NOTES
Pt Syed Christensen admitted to room 222-1 via transport in bed from ER at 0740.  Pt A&Ox4 .vs pain reported at 9 on a scale of 0-10. Oriented to room call light and smoking policy.  Reviewed plan of care (equipment, incentive spirometer, sequential compression devices, medications, activity, diet, fall precautions, skin care, and pain) with patient and family. Welcome packet given and reviewed with patient , all questions answered. Education provided on oral hygiene program.

## 2020-07-01 NOTE — PROGRESS NOTES
Attending Hospitalist today is Dr. Marsh starting at 0700. Please call this Physician for orders, updates and questions.   Marine Ceron RN  Hospitalist Service

## 2020-07-01 NOTE — ED PROVIDER NOTES
ED Provider Note    ER Provider Note         CHIEF COMPLAINT  Chief Complaint   Patient presents with   • Bloody Stools   • Abdominal Pain       HPI  Leonard Christensen is a 54 y.o. male who presents to the Emergency Department with abdominal pain.  The patient had a cholecystectomy around 5 weeks ago.  He has been having abdominal pain since that point.  He had an MRCP that did not show a stone however he had a CT scan that showed a dilated CBD.  He was sent over from [facility] for an ERCP.  He also mentions that he had some diarrhea that was blood-tinged.  He has had 3 episodes of that today.  He denies any vomiting blood.  Denies any dysuria.  There is no chest pain    REVIEW OF SYSTEMS  See HPI for further details. All other systems are negative.     PAST MEDICAL HISTORY   has a past medical history of Hypertension.    SURGICAL HISTORY  patient denies any surgical history    SOCIAL HISTORY  Social History     Tobacco Use   • Smoking status: Former Smoker     Types: Cigarettes     Last attempt to quit: 2000     Years since quittin.5   Substance Use Topics   • Alcohol use: No   • Drug use: Not on file      Social History     Substance and Sexual Activity   Drug Use Not on file       FAMILY HISTORY  Family History   Problem Relation Age of Onset   • Cancer Mother 65        Lung   • Heart Disease Father    • Hypertension Father    • Stroke Father        CURRENT MEDICATIONS  Home Medications     Reviewed by Cisco Vaca R.N. (Registered Nurse) on 20 at 0323  Med List Status: Complete   Medication Last Dose Status   atorvastatin (LIPITOR) 20 MG Tab 2020 Active   diazepam (VALIUM) 10 MG tablet UNKNOWN Active   finasteride (PROSCAR) 5 MG Tab 2020 Active   lisinopril (PRINIVIL, ZESTRIL) 40 MG tablet 2020 Active   methocarbamol (ROBAXIN) 750 MG Tab  Active   oxyCODONE-acetaminophen (PERCOCET-10)  MG Tab 2020 Active   therapeutic multivitamin-minerals (THERAGRAN-M)  "Tab 6/30/2020 Active   zolpidem (AMBIEN) 5 MG Tab 6/29 Active                ALLERGIES  Allergies   Allergen Reactions   • Pcn [Penicillins]        PHYSICAL EXAM  VITAL SIGNS: /71   Pulse 69   Resp 20   Ht 1.803 m (5' 11\")   Wt 65.6 kg (144 lb 10 oz)   SpO2 95%   BMI 20.17 kg/m²      Constitutional: Alert in no apparent distress.  HENT: No signs of trauma, Bilateral external ears normal, Nose normal.   Eyes: Pupils are equal and reactive, Conjunctiva normal, Non-icteric.   Neck: Normal range of motion, No tenderness, Supple, No stridor.   Lymphatic: No lymphadenopathy noted.   Cardiovascular: Regular rate and rhythm, nondisplaced PMI  Thorax & Lungs: No respiratory distress,  No chest tenderness.   Abdomen: Bowel sounds normal, Soft, tenderness palpation over the epigastrium., No masses, No pulsatile masses. No peritoneal signs.  Skin: Warm, Dry, No erythema, No rash.   Back: No bony tenderness, No CVA tenderness.   Extremities: Intact distal pulses, No edema, No tenderness, No cyanosis.  Musculoskeletal: Good range of motion in all major joints. No tenderness to palpation or major deformities noted.   Neurologic: Alert , Normal motor function, Normal sensory function, No focal deficits noted.   Psychiatric: Affect normal, Judgment normal, Mood normal.     DIAGNOSTIC STUDIES / PROCEDURES      LABS  Labs Reviewed   CBC WITH DIFFERENTIAL - Abnormal; Notable for the following components:       Result Value    RBC 3.99 (*)     Hemoglobin 10.9 (*)     Hematocrit 34.5 (*)     MCHC 31.6 (*)     All other components within normal limits   COMP METABOLIC PANEL - Abnormal; Notable for the following components:    Sodium 132 (*)     Glucose 192 (*)     AST(SGOT) 406 (*)     ALT(SGPT) 243 (*)     Alkaline Phosphatase 1406 (*)     All other components within normal limits   LIPASE - Abnormal; Notable for the following components:    Lipase 615 (*)     All other components within normal limits   ESTIMATED GFR "       All labs reviewed by me.        COURSE & MEDICAL DECISION MAKING  Pertinent Labs & Imaging studies reviewed. (See chart for details)    This is a 54 y.o. male that presents with abdominal pain.  The patient was found to have rising AST and ALT.  They were sent over here for an ERCP from [facility].  At this time we will get a repeat of the patient's CMP to evaluate for increasing LFTs.  In addition I will get a lipase to evaluate for pancreatitis.  We will admit the patient for his need for ERCP.    2:18 AM - Patient seen and examined at bedside.         Patient's LFTs are significantly elevated with an an ALT of 243 and AST of 409.  Alk phos is elevated at 1406.  The lipase is also significantly elevated at 615.  The patient will be admitted to the hospitalist in guarded condition for ERCP.  I will give the patient pain medication.    FINAL IMPRESSION  1. Abdominal pain, unspecified abdominal location    2. Transaminitis    3. Acute pancreatitis, unspecified complication status, unspecified pancreatitis type              Electronically signed by: Peter De León M.D., 7/1/2020

## 2020-07-01 NOTE — ED TRIAGE NOTES
Sent from [facility] for GI consult. Pt states that has had abd pain for 6 weeks and as of yesterday started to have bloody BMS.

## 2020-07-01 NOTE — PROGRESS NOTES
Patient seen and examined, admitted earlier today.  Presents with abdominal pain, tender to palpation suprapubic area.  Images from outside hospital show pelvic abscess.  GI and general surgery have been consulted.  IR, Dr Leach, will be by this afternoon to place drain.  Will continue with antibiotics and culture fluid collection.  D/w Dr Dowd, Dr Yap, Dr Leach.

## 2020-07-01 NOTE — ED PROVIDER NOTES
"I received signout from Dr. De León on this patient who is being held in the ER as there are no beds upstairs.  He was transferred from [facility] for GI consultation given elevation of transaminases, alk phos, and lipase in the setting of an MRCP that demonstrated \"fullness of the soft tissue at the sphincter of Oddi.\"  CT abdomen/pelvis was read as negative there.  More recently, since the patient has been here in the ER awaiting a bed, there was an over read of the CT abdomen/pelvis that the patient had at [facility] prior to transfer which now is felt to demonstrate an 8.5 cm pelvic abscess.    7:06 AM  Consulted general surgery.  Dr. Dowd will see pt.  Paging GI  Paging IR    7:18 AM  I discussed the case with Dr. Yap from GI who agrees to see the patient  I discussed the situation with the IR tech at [facility] who will page Dr. Leach.    10:22 AM  Discussed with Dr. Leach who is now aware of the pelvic abscess will f/u with Dr. Marsh from the hospitalist service.  "

## 2020-07-01 NOTE — ASSESSMENT & PLAN NOTE
-Alk phos, AST and ALT are all elevated and still trending up, d/w Dr Morris and MRI repeat with contrast recommended.  GI consulting.   MRI abdomen pending, HIDA scan shows no evidence of biliary leak.  -Repeat CMP in the morning

## 2020-07-01 NOTE — OR SURGEON
Immediate Post- Operative Note        PostOp Diagnosis: Pelvic Abscess      Procedure(s): CT Drainage      Estimated Blood Loss: Less than 5 ml        Complications: None            7/1/2020     4:23 PM     Tomi Leach M.D.

## 2020-07-01 NOTE — ASSESSMENT & PLAN NOTE
Tolerating regular diet.  Repeat Lipase 281 - pain free in this area  Transaminases trending down.

## 2020-07-01 NOTE — H&P
Hospital Medicine History & Physical Note    Date of Service  7/1/2020    Primary Care Physician  Rivas Owens M.D.    Code Status  Full Code    Chief Complaint  Chief Complaint   Patient presents with   • Bloody Stools   • Abdominal Pain       History of Presenting Illness  54 y.o. male who presented 7/1/2020 with abdominal pain and bloody stools.  Patient did have a cholecystectomy on May 22, since then he has had constant abdominal pain.  He states it is worse after eating and worse after a bowel movement.  He states that generalized, sometimes worse in the right lower quadrant, sharp, 10/10 at its worse.  He did receive a planned MRCP on Tuesday which was negative.  Patient then went home and began having blood in his stool.  He states he had 3 episodes within an hour of liquid and bloody stool.  He has not had another bowel movement since.  Patient initially presented to [facility], transferred here for higher level of care.  I did discuss the case including labs and imaging with the ER physician.    Review of Systems  Review of Systems   Constitutional: Negative for chills, fever and malaise/fatigue.   HENT: Negative for congestion.    Respiratory: Negative for cough, sputum production, shortness of breath and stridor.    Cardiovascular: Negative for chest pain, palpitations and leg swelling.   Gastrointestinal: Positive for abdominal pain and blood in stool. Negative for constipation, diarrhea, nausea and vomiting.   Genitourinary: Negative for dysuria and urgency.   Musculoskeletal: Negative for falls and myalgias.   Neurological: Negative for dizziness, tingling, loss of consciousness, weakness and headaches.   Psychiatric/Behavioral: Negative for depression and suicidal ideas.   All other systems reviewed and are negative.      Past Medical History   has a past medical history of Hypertension.    Surgical History  Cholecystectomy    Family History  family history includes Cancer (age of onset:  65) in his mother; Heart Disease in his father; Hypertension in his father; Stroke in his father.     Social History   reports that he has quit smoking. His smoking use included cigarettes. He does not have any smokeless tobacco history on file. He reports that he does not drink alcohol.    Allergies  Allergies   Allergen Reactions   • Pcn [Penicillins]        Medications  Prior to Admission Medications   Prescriptions Last Dose Informant Patient Reported? Taking?   atorvastatin (LIPITOR) 20 MG Tab 6/30/2020 at 0800  Yes Yes   Sig: Take 20 mg by mouth every evening.   diazepam (VALIUM) 10 MG tablet UNKNOWN at UNKNOWN  No No   Sig: Take 1 Tab by mouth every 6 hours as needed for Anxiety.   finasteride (PROSCAR) 5 MG Tab 6/29/2020 at 1800  Yes Yes   Sig: Take 5 mg by mouth every day.   lisinopril (PRINIVIL, ZESTRIL) 40 MG tablet 6/30/2020 at 0800  Yes No   Sig: Take 40 mg by mouth every day.   methocarbamol (ROBAXIN) 750 MG Tab 6/28 at UNKNOWN  Yes Yes   Sig: Take 750 mg by mouth 2 times a day as needed.   oxyCODONE-acetaminophen (PERCOCET-10)  MG Tab 6/30/2020 at 1200  Yes Yes   Sig: Take 1 Tab by mouth every 6 hours as needed for Severe Pain.   therapeutic multivitamin-minerals (THERAGRAN-M) Tab 6/30/2020 at 0800  Yes Yes   Sig: Take 1 Tab by mouth every day.   zolpidem (AMBIEN) 5 MG Tab 6/29 at 2100  Yes Yes   Sig: Take 2.5 mg by mouth at bedtime as needed for Sleep.      Facility-Administered Medications: None       Physical Exam  Pulse:  [69-75] 69  Resp:  [20] 20  BP: (120-133)/(67-71) 125/71  SpO2:  [95 %-97 %] 95 %    Physical Exam  Vitals signs and nursing note reviewed.   Constitutional:       General: He is not in acute distress.     Appearance: He is well-developed. He is ill-appearing. He is not toxic-appearing or diaphoretic.   HENT:      Head: Normocephalic and atraumatic.      Right Ear: External ear normal.      Left Ear: External ear normal.      Nose: Nose normal. No congestion or rhinorrhea.       Mouth/Throat:      Mouth: Mucous membranes are dry.      Pharynx: No oropharyngeal exudate.   Eyes:      General:         Right eye: No discharge.         Left eye: No discharge.      Extraocular Movements: Extraocular movements intact.   Neck:      Musculoskeletal: Normal range of motion and neck supple. No edema or erythema.      Trachea: No tracheal deviation.   Cardiovascular:      Rate and Rhythm: Normal rate and regular rhythm.      Heart sounds: No murmur. No friction rub. No gallop.    Pulmonary:      Effort: Pulmonary effort is normal. No respiratory distress.      Breath sounds: Normal breath sounds. No stridor. No wheezing or rales.   Chest:      Chest wall: No tenderness.   Abdominal:      General: Bowel sounds are normal. There is no distension.      Palpations: Abdomen is soft.      Tenderness: There is generalized abdominal tenderness.   Musculoskeletal: Normal range of motion.         General: No tenderness.      Right lower leg: No edema.      Left lower leg: No edema.   Lymphadenopathy:      Cervical: No cervical adenopathy.   Skin:     General: Skin is warm and dry.      Coloration: Skin is not jaundiced.      Findings: No erythema or rash.   Neurological:      General: No focal deficit present.      Mental Status: He is alert and oriented to person, place, and time.      Cranial Nerves: No cranial nerve deficit.   Psychiatric:         Mood and Affect: Mood normal.         Behavior: Behavior normal.         Thought Content: Thought content normal.         Judgment: Judgment normal.         Laboratory:  Recent Labs     07/01/20 0245   WBC 7.3   RBC 3.99*   HEMOGLOBIN 10.9*   HEMATOCRIT 34.5*   MCV 86.5   MCH 27.3   MCHC 31.6*   RDW 43.2   PLATELETCT 312   MPV 9.4     Recent Labs     07/01/20  0245   SODIUM 132*   POTASSIUM 4.1   CHLORIDE 96   CO2 23   GLUCOSE 192*   BUN 15   CREATININE 0.58   CALCIUM 9.4     Recent Labs     07/01/20  0245   ALTSGPT 243*   ASTSGOT 406*   ALKPHOSPHAT 1406*    TBILIRUBIN 0.7   LIPASE 615*   GLUCOSE 192*         No results for input(s): NTPROBNP in the last 72 hours.      No results for input(s): TROPONINT in the last 72 hours.    Imaging:  No orders to display         Assessment/Plan:    Pancreatitis- (present on admission)  Assessment & Plan  -Unknown cause, was a concern for obstruction however he does have a normal total bilirubin and a normal MRCP  -Patient denies alcohol  -Obtain cholesterol panel  -Keep patient n.p.o.  -Start IV fluids    Elevated liver enzymes- (present on admission)  Assessment & Plan  -Alk phos, AST and ALT are all elevated, total bilirubin is normal  -Numbers have been elevated since his surgery but this is worse per ERP  -Repeat CMP in the morning    GI bleed- (present on admission)  Assessment & Plan  -3 episodes of bright red blood per rectum  -Trend hemoglobin  -Patient is hemodynamically stable  -For the GI bleed as well as the elevated liver enzymes, ERP at [facility] felt there was a need for ERCP hence the transfer    Hypertension- (present on admission)  Assessment & Plan  -Continue home lisinopril  -Start PRN enalapril  -Adjust as needed    Dyslipidemia- (present on admission)  Assessment & Plan  -Continue home statin    BPH (benign prostatic hyperplasia)- (present on admission)  Assessment & Plan  -Continue home Proscar     Hyperglycemia- (present on admission)  Assessment & Plan  -Obtain Accu-Cheks  -Patient will be n.p.o., this point in time I am not going to start insulin sliding scale, he is insulin naïve, I do not want to cause hypoglycemia    Normocytic anemia- (present on admission)  Assessment & Plan  -Currently with no sign of gross bleeding however he has not had another bowel movement  -Frequent hemoglobins    Chronic back pain- (present on admission)  Assessment & Plan  -No acute worsening, will be treated with meds for his abdominal pain      Addendum: CT scan from [facility] was read this morning by the  regular radiologist, they did note an abdominal abscess that had been missed, I will start antibiotics, ERP is discussing the case with surgery

## 2020-07-01 NOTE — CONSULTS
DATE OF SERVICE:  07/01/2020    GASTROENTEROLOGY CONSULTATION    INDICATION FOR THE CONSULTATION:  Elevated LFTs, evaluate for ERCP.    HISTORY:  The patient is a 54-year-old gentleman who underwent a   cholecystectomy about 5 weeks ago.  He tells me that he had a gallstone found   incidentally at the time of ultrasound, and was referred for cholecystectomy.    At that time, he had no abdominal pains, nausea, vomiting, history of biliary   colic, etc.  Since the surgery, the patient reports that he has been having   progressive worsening of generalized abdominal pain, not just in the right   upper quadrant, but also more generally including his lower abdomen.  He has   had general malaise.  He denies any fevers or chills, any nausea or vomiting.    The patient presented to [facility] yesterday, and was found to have   elevated liver tests with an alkaline phosphatase of 1366, AST 85, , T   bili 0.3.  Imaging studies initially included CT scan and MRCP.  The MRCP   demonstrated a 1.1 cm common bile duct without any retained common bile duct   stones.  I do not have the CT scan report from [facility], but   apparently, this was initially read as normal, though this morning, the skin   was reread, and there was found to be an 8.5 cm encapsulated lesion in the   pelvis, believed to be an abscess.  Patient denies any history of liver   disease.  He denies any significant alcohol intake, if any.  He does report   having had a history of pancreatitis about 25 years ago, which he attributed   to hypertriglyceridemia, a condition for which he is followed by Dr. Michael Bloch.  He tells me that his triglycerides recently have been under control.    PAST MEDICAL HISTORY:  Significant for hypertriglyceridemia, hypertension;   duodenal ulcer, diagnosed 2 years ago at upper endoscopy, believed to be   NSAID-related.    PAST SURGICAL HISTORY:  Includes recent cholecystectomy 5 weeks ago, upper   endoscopy and  colonoscopy by Dr. Andersen from [facility] 2   years ago.  He denies any other abdominal surgeries.    MEDICATIONS AT HOME:  Include Chantix, atorvastatin, lisinopril,   hydrochlorothiazide, methocarbamol.    ALLERGIES:  PENICILLIN.    FAMILY HISTORY:  Noncontributory for any GI disease.    REVIEW OF SYSTEMS:  Significant for general malaise, generalized abdominal   pain.  He denies any fevers, chills, any chest pain or shortness of breath.    No nausea or vomiting.  No dysuria or hematuria.  Patient does not report any   blood in stools or any irregular bowel movements.  He denies any lower   extremity swelling, numbness, tingling, pain or weakness.    PHYSICAL EXAMINATION:  VITAL SIGNS:  Temperature is 36.8, pulse 71, respirations 18, blood pressure   is 100/52.  GENERAL:  This is a thin, but otherwise well-developed gentleman, in no   apparent distress.  HEENT:  Sclerae are anicteric.  LUNGS:  Clear.  HEART:  Regular rhythm.  ABDOMEN:  Soft.  There is some tenderness, particularly in the suprapubic area   with some guarding, no rebound.  Bowel sounds are present and normal.  EXTREMITIES:  No rashes or edema present.  Pulses are present in the distal   extremities bilaterally.    LABORATORY DATA:  As above in HPI.    IMAGING:  As above in HPI.    ASSESSMENT AND PLAN:  1.  Pelvic abscess.  2.  Elevated liver enzymes, nonspecific, most suggestive of a nonspecific   hepatitis, rather than an obstructive etiology.  3.  Recent cholecystectomy 5 weeks ago.  4.  History of hypertension.  5.  History of hypertriglyceridemia.  6.  Remote history of pancreatitis, believed to be secondary to   hypertriglyceridemia.    DISCUSSION:  The patient is a 54-year-old gentleman who presents with   progressive abdominal pains for the past 5 weeks, now with an 8.5 cm pelvic   abscess seen on CT scan.  At this point, there is no strong evidence for   common duct obstruction, with nonspecific mild-to-moderate  elevations of his   liver enzymes, and no stone or other obstructive process seen on MRCP.  His   bile duct is mildly dilated at 1.1 cm, though this is probably secondary to   his postcholecystectomy state.  I suspect his liver enzymes are probably   elevated secondarily to infection related to his abscess.  At this point, I   would not recommend proceeding with ERCP, but rather focusing attention   entirely on the patient's abscess with antibiotics, and drainage.  General   surgery, and interventional radiology have already been consulted.  We will   follow this patient along, and should he require an ERCP, then this will   certainly be scheduled at that time.    Thank you for allowing us to participate in the care of the patient.       ____________________________________     MD HEATHER KLEIN / NTS    DD:  07/01/2020 09:00:57  DT:  07/01/2020 11:57:46    D#:  [id number]  Job#:  [id number]

## 2020-07-02 PROBLEM — K65.1 PELVIC ABSCESS IN MALE (HCC): Status: ACTIVE | Noted: 2020-07-02

## 2020-07-02 LAB
ALBUMIN SERPL BCP-MCNC: 3 G/DL (ref 3.2–4.9)
ALBUMIN/GLOB SERPL: 1.1 G/DL
ALP SERPL-CCNC: 992 U/L (ref 30–99)
ALT SERPL-CCNC: 198 U/L (ref 2–50)
ANION GAP SERPL CALC-SCNC: 12 MMOL/L (ref 7–16)
AST SERPL-CCNC: 148 U/L (ref 12–45)
BILIRUB SERPL-MCNC: 0.3 MG/DL (ref 0.1–1.5)
BUN SERPL-MCNC: 8 MG/DL (ref 8–22)
CALCIUM SERPL-MCNC: 9.1 MG/DL (ref 8.4–10.2)
CHLORIDE SERPL-SCNC: 101 MMOL/L (ref 96–112)
CO2 SERPL-SCNC: 23 MMOL/L (ref 20–33)
CREAT SERPL-MCNC: 0.52 MG/DL (ref 0.5–1.4)
ERYTHROCYTE [DISTWIDTH] IN BLOOD BY AUTOMATED COUNT: 45 FL (ref 35.9–50)
GLOBULIN SER CALC-MCNC: 2.7 G/DL (ref 1.9–3.5)
GLUCOSE SERPL-MCNC: 172 MG/DL (ref 65–99)
GRAM STN SPEC: NORMAL
HCT VFR BLD AUTO: 32.5 % (ref 42–52)
HGB BLD-MCNC: 9.3 G/DL (ref 14–18)
HGB BLD-MCNC: 9.9 G/DL (ref 14–18)
MCH RBC QN AUTO: 27.2 PG (ref 27–33)
MCHC RBC AUTO-ENTMCNC: 30.5 G/DL (ref 33.7–35.3)
MCV RBC AUTO: 89.3 FL (ref 81.4–97.8)
PLATELET # BLD AUTO: 274 K/UL (ref 164–446)
PMV BLD AUTO: 9.8 FL (ref 9–12.9)
POTASSIUM SERPL-SCNC: 3.7 MMOL/L (ref 3.6–5.5)
PROT SERPL-MCNC: 5.7 G/DL (ref 6–8.2)
RBC # BLD AUTO: 3.64 M/UL (ref 4.7–6.1)
SIGNIFICANT IND 70042: NORMAL
SITE SITE: NORMAL
SODIUM SERPL-SCNC: 136 MMOL/L (ref 135–145)
SOURCE SOURCE: NORMAL
WBC # BLD AUTO: 5.4 K/UL (ref 4.8–10.8)

## 2020-07-02 PROCEDURE — 700102 HCHG RX REV CODE 250 W/ 637 OVERRIDE(OP): Performed by: INTERNAL MEDICINE

## 2020-07-02 PROCEDURE — 99233 SBSQ HOSP IP/OBS HIGH 50: CPT | Performed by: INTERNAL MEDICINE

## 2020-07-02 PROCEDURE — 96365 THER/PROPH/DIAG IV INF INIT: CPT

## 2020-07-02 PROCEDURE — 700102 HCHG RX REV CODE 250 W/ 637 OVERRIDE(OP): Performed by: RADIOLOGY

## 2020-07-02 PROCEDURE — 85027 COMPLETE CBC AUTOMATED: CPT

## 2020-07-02 PROCEDURE — 87077 CULTURE AEROBIC IDENTIFY: CPT

## 2020-07-02 PROCEDURE — 85018 HEMOGLOBIN: CPT

## 2020-07-02 PROCEDURE — 700101 HCHG RX REV CODE 250: Performed by: INTERNAL MEDICINE

## 2020-07-02 PROCEDURE — 80053 COMPREHEN METABOLIC PANEL: CPT

## 2020-07-02 PROCEDURE — 96375 TX/PRO/DX INJ NEW DRUG ADDON: CPT

## 2020-07-02 PROCEDURE — 36415 COLL VENOUS BLD VENIPUNCTURE: CPT

## 2020-07-02 PROCEDURE — 99285 EMERGENCY DEPT VISIT HI MDM: CPT

## 2020-07-02 PROCEDURE — 700105 HCHG RX REV CODE 258: Performed by: INTERNAL MEDICINE

## 2020-07-02 PROCEDURE — 87070 CULTURE OTHR SPECIMN AEROBIC: CPT

## 2020-07-02 PROCEDURE — 96376 TX/PRO/DX INJ SAME DRUG ADON: CPT

## 2020-07-02 PROCEDURE — 87205 SMEAR GRAM STAIN: CPT

## 2020-07-02 PROCEDURE — A9270 NON-COVERED ITEM OR SERVICE: HCPCS | Performed by: INTERNAL MEDICINE

## 2020-07-02 PROCEDURE — 87186 SC STD MICRODIL/AGAR DIL: CPT

## 2020-07-02 PROCEDURE — 700111 HCHG RX REV CODE 636 W/ 250 OVERRIDE (IP): Performed by: INTERNAL MEDICINE

## 2020-07-02 PROCEDURE — 770006 HCHG ROOM/CARE - MED/SURG/GYN SEMI*

## 2020-07-02 PROCEDURE — A9270 NON-COVERED ITEM OR SERVICE: HCPCS | Performed by: RADIOLOGY

## 2020-07-02 RX ORDER — METRONIDAZOLE 500 MG/1
500 TABLET ORAL EVERY 8 HOURS
Status: DISCONTINUED | OUTPATIENT
Start: 2020-07-02 | End: 2020-07-04

## 2020-07-02 RX ADMIN — SODIUM CHLORIDE, POTASSIUM CHLORIDE, SODIUM LACTATE AND CALCIUM CHLORIDE: 600; 310; 30; 20 INJECTION, SOLUTION INTRAVENOUS at 20:59

## 2020-07-02 RX ADMIN — SENNOSIDES-DOCUSATE SODIUM TAB 8.6-50 MG 2 TABLET: 8.6-5 TAB at 06:16

## 2020-07-02 RX ADMIN — OXYCODONE HYDROCHLORIDE 10 MG: 10 TABLET ORAL at 17:33

## 2020-07-02 RX ADMIN — FINASTERIDE 5 MG: 5 TABLET, FILM COATED ORAL at 06:17

## 2020-07-02 RX ADMIN — ATORVASTATIN CALCIUM 20 MG: 10 TABLET, FILM COATED ORAL at 20:28

## 2020-07-02 RX ADMIN — ACETAMINOPHEN 1000 MG: 500 TABLET, FILM COATED ORAL at 12:30

## 2020-07-02 RX ADMIN — LISINOPRIL 40 MG: 20 TABLET ORAL at 06:18

## 2020-07-02 RX ADMIN — OXYCODONE HYDROCHLORIDE 10 MG: 10 TABLET ORAL at 20:28

## 2020-07-02 RX ADMIN — METRONIDAZOLE 500 MG: 500 TABLET ORAL at 13:58

## 2020-07-02 RX ADMIN — OXYCODONE HYDROCHLORIDE 5 MG: 5 TABLET ORAL at 09:13

## 2020-07-02 RX ADMIN — OXYCODONE HYDROCHLORIDE 10 MG: 10 TABLET ORAL at 14:00

## 2020-07-02 RX ADMIN — METRONIDAZOLE 500 MG: 500 INJECTION, SOLUTION INTRAVENOUS at 07:17

## 2020-07-02 RX ADMIN — METRONIDAZOLE 500 MG: 500 TABLET ORAL at 20:31

## 2020-07-02 RX ADMIN — CEFTRIAXONE SODIUM 2 G: 2 INJECTION, POWDER, FOR SOLUTION INTRAMUSCULAR; INTRAVENOUS at 06:22

## 2020-07-02 RX ADMIN — ACETAMINOPHEN 1000 MG: 500 TABLET, FILM COATED ORAL at 06:17

## 2020-07-02 ASSESSMENT — ENCOUNTER SYMPTOMS
COUGH: 0
HEADACHES: 0
MYALGIAS: 0
HEARTBURN: 0
SPEECH CHANGE: 0
ABDOMINAL PAIN: 1
VOMITING: 0
CLAUDICATION: 0
SORE THROAT: 0
SHORTNESS OF BREATH: 0
SENSORY CHANGE: 0
CONSTIPATION: 0
DIARRHEA: 0
BLOOD IN STOOL: 0
DIZZINESS: 0
FEVER: 0
INSOMNIA: 0
NERVOUS/ANXIOUS: 0
CHILLS: 0
DEPRESSION: 0
NAUSEA: 0
WEAKNESS: 0
PHOTOPHOBIA: 0
BLURRED VISION: 0

## 2020-07-02 NOTE — PROGRESS NOTES
Pt AAOx4. Denies any pain, nausea, dizziness. Due meds given. Pt ambulated to the bathroom (to void) w/o difficulty using FWW on standby assist. Gait steady. Drain to pelvis (right) emptied w/ serosanguinous output. Pt now back to bed. IVF infusing. Safety and comfort measures in place. No additional needs at this time.

## 2020-07-02 NOTE — ASSESSMENT & PLAN NOTE
ROWDY drain placed under imaging guidance by Dr Leach - 70cc drained in past 24 hours, repeat CT to see if residual fluid collection present prior to removal of drain  Fluid collection smaller but still present and culture positive - keep drain in place.  ROWDY with serosanguinous drainage.  Fluid culture showing enterobacter sn to rocephin, dc flagyl

## 2020-07-02 NOTE — DISCHARGE PLANNING
Patient is a 54 year old  man who lives with spouseYakelin in [place], [place]. Patient is retired has [employer] med[employer] insurance.     PCP is Dr. Owens. Also seen by Dr. Bloch for vascular medicine. Patient is here for bloody stools, diagnosed with acute pancreatitis. No issues noted with ADLs or IADLs. No home O2 noted, no hospital O2.     Pharmacy is [facility] on [address]. No issues with drugs, alcohol, or mental health.     Patient is doing better per notes, awaiting culture results on ROWDY. Anticipate no d/c needs once medically clear.     Care Transition Team Assessment    Information Source  Information Given By: Patient  Informant's Name: cindy Christensen  Who is responsible for making decisions for patient? : Patient    Readmission Evaluation  Is this a readmission?: Yes - unplanned readmission  Why do you think you were readmitted?: Acute Pancreatitis    Elopement Risk  Legal Hold: No  Ambulatory or Self [place] in Wheelchair: Yes  Disoriented: No  Psychiatric Symptoms: None  History of Wandering: No  Elopement this Admit: No  Vocalizing Wanting to Leave: No  Displays Behaviors, Body Language Wanting to Leave: No-Not at Risk for Elopement  Elopement Risk: Not at Risk for Elopement    Interdisciplinary Discharge Planning  Does Admitting Nurse Feel This Could be a Complex Discharge?: No  Lives with - Patient's Self Care Capacity: Spouse  Patient or legal guardian wants to designate a caregiver (see row info): No  Support Systems: None  Housing / Facility: [address]  Do You Take your Prescribed Medications Regularly: Yes  Able to Return to Previous ADL's: Yes  Mobility Issues: No  Prior Services: None  Patient Expects to be Discharged to:: home  Assistance Needed: No  Durable Medical Equipment: Not Applicable    Discharge Preparedness  What is your plan after discharge?: Home with help  What are your discharge supports?: Spouse  Prior Functional Level: Ambulatory, Independent with Activities of Daily Living, Independent  with Medication Management  Difficulity with ADLs: None  Difficulity with IADLs: None    Functional Assesment  Prior Functional Level: Ambulatory, Independent with Activities of Daily Living, Independent with Medication Management    Finances  Financial Barriers to Discharge: No  Prescription Coverage: Yes    Vision / Hearing Impairment  Vision Impairment : No  Hearing Impairment : No         Advance Directive  Advance Directive?: None    Domestic Abuse  Have you ever been the victim of abuse or violence?: No  Physical Abuse or Sexual Abuse: No  Verbal Abuse or Emotional Abuse: No  Possible Abuse Reported to:: Not Applicable    Psychological Assessment  History of Substance Abuse: None  History of Psychiatric Problems: No  Non-compliant with Treatment: No  Newly Diagnosed Illness: Yes    Discharge Risks or Barriers  Discharge risks or barriers?: No    Anticipated Discharge Information  Anticipated discharge disposition: Home  Discharge Address: [address]  Discharge Contact Phone Number: [phone]

## 2020-07-02 NOTE — PROGRESS NOTES
Hospital Medicine Daily Progress Note    Date of Service  7/2/2020    Chief Complaint  54 y.o. male admitted 7/1/2020 with abdominal pain.    Hospital Course    Patient initially presented to [facility] with 3 episodes of rectal bleeding and increasing abdominal pain.  He had elevated transaminases and lipase is post cholecystectomy in 5/2020 with Dr Concepcion.  He was transferred for GI evaluation for ERCP given lab abnormalities.  CT abdomen was done and CBD dilated and also a pelvic fluid collection found.  IR came to [facility] and placed ROWDY drain in the pelvis with relief of the fluid collection, no purulence noted and cultures pending.   Patient seen by GI and General Surgery.        Interval Problem Update  7/2 Patient feeling better today compared to prior to drain placement.  Pain is more at the drain site insertion and still with RUQ pain reproducible to palpation just below the ribcage.  Transaminases trending down and he is tolerating a clear liquid diet without issue.     Consultants/Specialty  Tyrel Dowd  GI - [facility]    Code Status  full    Disposition  tbd    Review of Systems  Review of Systems   Constitutional: Positive for malaise/fatigue. Negative for chills and fever.   HENT: Negative for congestion and sore throat.    Eyes: Negative for blurred vision and photophobia.   Respiratory: Negative for cough and shortness of breath.    Cardiovascular: Negative for chest pain, claudication and leg swelling.   Gastrointestinal: Positive for abdominal pain. Negative for constipation, diarrhea, heartburn, nausea and vomiting.   Genitourinary: Negative for dysuria and hematuria.   Musculoskeletal: Negative for joint pain and myalgias.   Skin: Negative for itching and rash.   Neurological: Negative for dizziness, sensory change, speech change, weakness and headaches.   Psychiatric/Behavioral: Negative for depression. The patient is not nervous/anxious and does not have insomnia.         Physical Exam  Temp:  [36.6 °C (97.8  °F)-37.2 °C (98.9 °F)] 36.7 °C (98 °F)  Pulse:  [59-78] 78  Resp:  [12-18] 18  BP: ()/(46-74) 158/73  SpO2:  [98 %-100 %] 99 %    Physical Exam  Vitals signs and nursing note reviewed.   Constitutional:       General: He is not in acute distress.     Appearance: Normal appearance.   HENT:      Head: Normocephalic and atraumatic.   Eyes:      General: No scleral icterus.     Extraocular Movements: Extraocular movements intact.   Neck:      Musculoskeletal: Normal range of motion and neck supple.   Cardiovascular:      Rate and Rhythm: Normal rate and regular rhythm.      Pulses: Normal pulses.      Heart sounds: Normal heart sounds. No murmur.   Pulmonary:      Effort: Pulmonary effort is normal. No respiratory distress.      Breath sounds: Normal breath sounds. No wheezing, rhonchi or rales.   Abdominal:      General: Abdomen is flat. Bowel sounds are normal. There is no distension.      Palpations: Abdomen is soft.      Tenderness: There is no rebound.      Comments: ROWDY drain in place     Musculoskeletal:         General: No swelling or tenderness.   Lymphadenopathy:      Cervical: No cervical adenopathy.   Skin:     Coloration: Skin is not jaundiced.      Findings: No erythema.   Neurological:      General: No focal deficit present.      Mental Status: He is alert and oriented to person, place, and time. Mental status is at baseline.      Cranial Nerves: No cranial nerve deficit.   Psychiatric:         Mood and Affect: Mood normal.         Behavior: Behavior normal.         Fluids    Intake/Output Summary (Last 24 hours) at 7/2/2020 1119  Last data filed at 7/2/2020 1000  Gross per 24 hour   Intake 2407.08 ml   Output 1345 ml   Net 1062.08 ml       Laboratory  Recent Labs     07/01/20  0245 07/01/20  1033 07/01/20  1900 07/02/20  0441   WBC 7.3  --   --  5.4   RBC 3.99*  --   --  3.64*   HEMOGLOBIN 10.9* 9.6* 9.4* 9.9*   HEMATOCRIT 34.5*  --   --  32.5*   MCV 86.5  --   --  89.3   MCH 27.3  --   --  27.2    MCHC 31.6*  --   --  30.5*   RDW 43.2  --   --  45.0   PLATELETCT 312  --   --  274   MPV 9.4  --   --  9.8     Recent Labs     07/01/20  0245 07/02/20  0441   SODIUM 132* 136   POTASSIUM 4.1 3.7   CHLORIDE 96 101   CO2 23 23   GLUCOSE 192* 172*   BUN 15 8   CREATININE 0.58 0.52   CALCIUM 9.4 9.1     Recent Labs     07/01/20  1312   APTT 26.6   INR 1.04         Recent Labs     07/01/20  0635   TRIGLYCERIDE 146   HDL 34*   LDL 60       Imaging  CT-DRAIN-PERITONEAL   Final Result      1.  CT guided pelvic abscess catheter drainage.   2.  The current plan is to obtain a follow-up CT in 5-7 days..      MR-FOREIGN FILM MRI   Final Result      OUTSIDE IMAGES-CT ABDOMEN /PELVIS   Final Result           Assessment/Plan  Pancreatitis- (present on admission)  Assessment & Plan  Dilated common bile duct on imaging  Tolerating clears  Repeat Lipase in am  Transaminases trending down.    Elevated liver enzymes- (present on admission)  Assessment & Plan  -Alk phos, AST and ALT are all elevated but trending down significantly   total bilirubin is normal  GI consulting.  -Numbers have been elevated since his surgery but this is worse per ERP  -Repeat CMP in the morning    Pelvic abscess in male (HCC)  Assessment & Plan  ROWDY drain placed under imaging guidance by Dr Leach  Fluid culture pending  ROWDY with serosanguinous drainage.  Dr Dowd consulted      GI bleed- (present on admission)  Assessment & Plan  ? Hemorrhoidal bleed  -3 episodes of bright red blood per rectum prior to ED visit  -hemoglobin stable even with IV hydration  -Patient is hemodynamically stable  No further episodes of bleeding since transfer here.      Hypertension- (present on admission)  Assessment & Plan  -Continue home lisinopril  -Start PRN enalapril  -Adjust as needed    Dyslipidemia- (present on admission)  Assessment & Plan  -Continue home statin    BPH (benign prostatic hyperplasia)- (present on admission)  Assessment & Plan  -Continue home Proscar      Hyperglycemia- (present on admission)  Assessment & Plan  -Obtain Accu-Cheks      Normocytic anemia- (present on admission)  Assessment & Plan  -Currently with no sign of gross bleeding however he has not had another bowel movement  -Frequent hemoglobins    Chronic back pain- (present on admission)  Assessment & Plan  -No acute worsening, will be treated with meds for his abdominal pain         VTE prophylaxis: SCDs

## 2020-07-02 NOTE — CARE PLAN
Problem: Communication  Goal: The ability to communicate needs accurately and effectively will improve  Outcome: PROGRESSING AS EXPECTED   Pt able to communicate needs appropriately to staff. Plan of care discussed to pt. Questions and concerns answered. Pt. Verbalized understanding. Communication board updated.     Problem: Safety  Goal: Will remain free from injury  Outcome: PROGRESSING AS EXPECTED   Pt educated to call when in need. Call light and personal belongings w/n reach. Room free of clutters. Bed locked and in lowest position. Answer call light immediately.       Problem: Pain Management  Goal: Pain level will decrease to patient's comfort goal  Outcome: PROGRESSING AS EXPECTED   Pt's pain is at comfortable level. Encourage to inform RN if pain is greater than comfort level.

## 2020-07-02 NOTE — PROGRESS NOTES
Fluid cx collected yesterday, but not in process. New fluid cx sent down to lab and clarified with Marah boles.

## 2020-07-02 NOTE — PROGRESS NOTES
Trauma / Surgical Daily Progress Note    Date of Service  7/2/2020    Chief Complaint  54 y.o. male admitted 7/1/2020 with Acute pancreatitis    Interval Events  Doing better. LFTs down. Lipase elevated ? Passed stone. Cont on clear liquids. Await culture results on ROWDY    Review of Systems  Review of Systems   Gastrointestinal: Positive for abdominal pain.        Vital Signs for last 24 hours  Temp:  [36.6 °C (97.8 °F)-37.2 °C (98.9 °F)] 36.6 °C (97.8 °F)  Pulse:  [59-89] 75  Resp:  [12-18] 18  BP: ()/(44-74) 141/74  SpO2:  [92 %-100 %] 99 %    Hemodynamic parameters for last 24 hours       Respiratory Data     Respiration: 18, Pulse Oximetry: 99 %             Physical Exam  Physical Exam  Eyes:      General: No scleral icterus.  Neck:      Musculoskeletal: Neck supple.   Cardiovascular:      Rate and Rhythm: Normal rate.   Pulmonary:      Effort: Pulmonary effort is normal.   Abdominal:      General: There is no distension.      Palpations: Abdomen is soft.      Tenderness: There is abdominal tenderness.      Comments: ROWDY serous ? bilious   Skin:     General: Skin is warm.   Neurological:      Mental Status: He is alert.         Laboratory  Recent Results (from the past 24 hour(s))   ACCU-CHEK GLUCOSE    Collection Time: 07/01/20  8:52 AM   Result Value Ref Range    Glucose - Accu-Ck 174 (H) 65 - 99 mg/dL   HGB    Collection Time: 07/01/20 10:33 AM   Result Value Ref Range    Hemoglobin 9.6 (L) 14.0 - 18.0 g/dL   Prothrombin Time    Collection Time: 07/01/20  1:12 PM   Result Value Ref Range    PT 13.7 12.0 - 14.6 sec    INR 1.04 0.87 - 1.13   APTT    Collection Time: 07/01/20  1:12 PM   Result Value Ref Range    APTT 26.6 24.7 - 36.0 sec   ACCU-CHEK GLUCOSE    Collection Time: 07/01/20  4:17 PM   Result Value Ref Range    Glucose - Accu-Ck 137 (H) 65 - 99 mg/dL   HGB    Collection Time: 07/01/20  7:00 PM   Result Value Ref Range    Hemoglobin 9.4 (L) 14.0 - 18.0 g/dL   CBC without Differential    Collection  Time: 07/02/20  4:41 AM   Result Value Ref Range    WBC 5.4 4.8 - 10.8 K/uL    RBC 3.64 (L) 4.70 - 6.10 M/uL    Hemoglobin 9.9 (L) 14.0 - 18.0 g/dL    Hematocrit 32.5 (L) 42.0 - 52.0 %    MCV 89.3 81.4 - 97.8 fL    MCH 27.2 27.0 - 33.0 pg    MCHC 30.5 (L) 33.7 - 35.3 g/dL    RDW 45.0 35.9 - 50.0 fL    Platelet Count 274 164 - 446 K/uL    MPV 9.8 9.0 - 12.9 fL   Comp Metabolic Panel (CMP)    Collection Time: 07/02/20  4:41 AM   Result Value Ref Range    Sodium 136 135 - 145 mmol/L    Potassium 3.7 3.6 - 5.5 mmol/L    Chloride 101 96 - 112 mmol/L    Co2 23 20 - 33 mmol/L    Anion Gap 12.0 7.0 - 16.0    Glucose 172 (H) 65 - 99 mg/dL    Bun 8 8 - 22 mg/dL    Creatinine 0.52 0.50 - 1.40 mg/dL    Calcium 9.1 8.4 - 10.2 mg/dL    AST(SGOT) 148 (H) 12 - 45 U/L    ALT(SGPT) 198 (H) 2 - 50 U/L    Alkaline Phosphatase 992 (H) 30 - 99 U/L    Total Bilirubin 0.3 0.1 - 1.5 mg/dL    Albumin 3.0 (L) 3.2 - 4.9 g/dL    Total Protein 5.7 (L) 6.0 - 8.2 g/dL    Globulin 2.7 1.9 - 3.5 g/dL    A-G Ratio 1.1 g/dL   ESTIMATED GFR    Collection Time: 07/02/20  4:41 AM   Result Value Ref Range    GFR If African American >60 >60 mL/min/1.73 m 2    GFR If Non African American >60 >60 mL/min/1.73 m 2       Fluids    Intake/Output Summary (Last 24 hours) at 7/2/2020 0734  Last data filed at 7/2/2020 0652  Gross per 24 hour   Intake 2407.08 ml   Output 1315 ml   Net 1092.08 ml       Core Measures & Quality Metrics  Labs reviewed and Medications reviewed        DVT Prophylaxis: Enoxaparin (Lovenox)  DVT prophylaxis - mechanical: SCDs  Ulcer prophylaxis: Yes  Antibiotics: Treating active infection/contamination beyond 24 hours perioperative coverage  Assessed for rehab: Patient returned to prior level of function, rehabilitation not indicated at this time    ODILIA Score  ETOH Screening    Assessment/Plan  No new Assessment & Plan notes have been filed under this hospital service since the last note was generated.  Service: Surgery  General      Discussed patient condition with RN and Patient.  CRITICAL CARE TIME EXCLUDING PROCEDURES: 20    minutes

## 2020-07-02 NOTE — DIETARY
"Nutrition services: Day 1 of admit.  Leonard Christensen is a 54 y.o. male with admitting DX of Acute pancreatitis. Pt with placement of ROWDY drain to pelvic abscess. DX includes HTN, dyslipidemia, hyperglycemia, normocytic anemia.     Consult received for MST of 3 on nutrition screen due to report of 14-23 lb wt loss x 1 month and poor PO PTA.       Assessment:  Height: 180 cm (5' 10.87\")  Weight: 65 kg (143 lb 4.8 oz)  Body mass index is 20.06 kg/m²., BMI classification: normal  Diet/Intake: just upgraded to regular, clear liquids before this/no documented PO intake yet    Evaluation:   1. Pt reports significant wt loss of ~23 lb (14%) x 5 to 6 weeks after his operation. Wt loss was due to diminished appetite due to discomfort after PO intake. He said initially his intake dropped down to 2 regular meals a day and then over the past week he was eating soup, bread and fruit. He thought that his intake has been 1/2 of his normal intake. He could tell he has lost weight. His arms, legs and face are thinner. RD noticed indentations in the temple and zygomatic arch areas. Pt said that he ate all of his clear liquids at breakfast and lunch. He is looking forward to his regular diet dinner. Currently, adequate intake is expected.     2. Labs: 7/2/20: glucose=172. Glucose accucheck: 137, 174, 155  3. Meds: Rocephin, Flagyl    Malnutrition Risk: Severe malnutrition in the context of acute illness related to pain with PO causing diminished intake AEB 14% wt loss x 5 to 6 weeks, PO of 50% of his normal intake x 1 week and moderate fat loss noted in the orbital region and moderate muscle loss in the temple region.     Recommendations/Plan:   1. Encourage intake of >50%  2. Document intake of all meals  as % taken in ADL's to provide interdisciplinary communication across all shifts.   3. Monitor weight.  4. Nutrition rep will continue to see patient for ongoing meal and snack preferences.     RD will monitor.     "

## 2020-07-02 NOTE — PROGRESS NOTES
PT seen and examined  6 weeks SP eugene garcia at [facility]  Ongoing pain  Workup shows elevated LFTs x bilirubin  MRCP shows dilated CBD  CT shows pelvic abscess  IR drained abscess  Follow up on LFTS in AM  If still elevated, ? Biliary stricture  Will follow

## 2020-07-02 NOTE — PROGRESS NOTES
Gastroenterology Progress Note     Author: Rivas Raines M.D.   Date & Time Created: 7/2/2020 12:23 PM    Chief Complaint:  Abdominal pain, rectal bleeding    Interval History:  S/p ROWDY drain of pelvic abscess. Now improved pain. Says he has discomfort from the drain when moving. Denies N/V. Says that he had a normal colored stool this morning.     Review of Systems:  Review of Systems   Constitutional: Negative for chills and fever.   Respiratory: Negative for shortness of breath.    Cardiovascular: Negative for chest pain.   Gastrointestinal: Positive for abdominal pain. Negative for blood in stool, constipation, diarrhea, heartburn, melena, nausea and vomiting.       Physical Exam:  Physical Exam  Constitutional:       Appearance: Normal appearance. He is not ill-appearing.   Abdominal:      General: Abdomen is flat. Bowel sounds are normal. There is no distension.      Palpations: Abdomen is soft.      Tenderness: There is abdominal tenderness.      Comments: ROWDY in place   Neurological:      Mental Status: He is alert.         Labs:          Recent Labs     07/01/20 0245 07/02/20 0441   SODIUM 132* 136   POTASSIUM 4.1 3.7   CHLORIDE 96 101   CO2 23 23   BUN 15 8   CREATININE 0.58 0.52   CALCIUM 9.4 9.1     Recent Labs     07/01/20 0245 07/02/20 0441   ALTSGPT 243* 198*   ASTSGOT 406* 148*   ALKPHOSPHAT 1406* 992*   TBILIRUBIN 0.7 0.3   LIPASE 615*  --    GLUCOSE 192* 172*     Recent Labs     07/01/20 0245 07/01/20  1033 07/01/20  1312 07/01/20  1900 07/02/20  0441   RBC 3.99*  --   --   --  3.64*   HEMOGLOBIN 10.9* 9.6*  --  9.4* 9.9*   HEMATOCRIT 34.5*  --   --   --  32.5*   PLATELETCT 312  --   --   --  274   PROTHROMBTM  --   --  13.7  --   --    APTT  --   --  26.6  --   --    INR  --   --  1.04  --   --      Recent Labs     07/01/20 0245 07/02/20 0441   WBC 7.3 5.4   NEUTSPOLYS 53.80  --    LYMPHOCYTES 32.70  --    MONOCYTES 11.00  --    EOSINOPHILS 1.40  --    BASOPHILS 0.40  --    ASTSGOT 406*  148*   ALTSGPT 243* 198*   ALKPHOSPHAT 1406* 992*   TBILIRUBIN 0.7 0.3     Hemodynamics:  Temp (24hrs), Av.8 °C (98.2 °F), Min:36.6 °C (97.8 °F), Max:37.2 °C (98.9 °F)  Temperature: 36.7 °C (98 °F)  Pulse  Av.2  Min: 59  Max: 89   Blood Pressure: 158/73, NIBP: 116/68     Respiratory:    Respiration: 18, Pulse Oximetry: 99 %           Fluids:    Intake/Output Summary (Last 24 hours) at 2020 1223  Last data filed at 2020 1000  Gross per 24 hour   Intake 2407.08 ml   Output 1345 ml   Net 1062.08 ml     Weight: 65 kg (143 lb 4.8 oz)  GI/Nutrition:  Orders Placed This Encounter   Procedures   • Diet Order Regular     Standing Status:   Standing     Number of Occurrences:   1     Order Specific Question:   Diet:     Answer:   Regular [1]     Medical Decision Making, by Problem:  Active Hospital Problems    Diagnosis   • Pancreatitis [K85.90]   • Elevated liver enzymes [R74.8]   • Pelvic abscess in male (HCC) [K65.1]   • Normocytic anemia [D64.9]   • Hyperglycemia [R73.9]   • BPH (benign prostatic hyperplasia) [N40.0]   • Dyslipidemia [E78.5]   • Hypertension [I10]   • GI bleed [K92.2]   • Chronic back pain [M54.9, G89.29]       Plan:  Elevated LFTs post cholecystectomy 5 weeks ago - MRCP reviewed again today by myself. There does not appear to be any filling defects present. The duct is mildly dilated but this is much more likely to be post-cholecystectomy effect rather than biliary stricture. LFTs have improved today with treatment of the pelvic abscess. Continue antibiotics and drain    Rectal bleeding - limited and resolved. Patient reports colonoscopy within the last 2-3 years with Dr Cummings. He can f/u with Dr Cummings as outpatient to determine need for repeat colonoscopy.    Follow LFTs and if they continue to improve, no ERCP is needed. Advance diet today    Quality-Core Measures

## 2020-07-02 NOTE — RESPIRATORY CARE
COPD EDUCATION by COPD CLINICAL EDUCATOR  7/2/2020 at 10:34 AM by Ena Diaz RRT     Patient reviewed by COPD education team. Patient does not have a history or diagnosis of COPD and is a former smoker, therefore does not qualify for the COPD program.

## 2020-07-03 ENCOUNTER — APPOINTMENT (OUTPATIENT)
Dept: RADIOLOGY | Facility: MEDICAL CENTER | Age: 55
DRG: 862 | End: 2020-07-03
Attending: INTERNAL MEDICINE
Payer: MEDICAID

## 2020-07-03 LAB
ALBUMIN SERPL BCP-MCNC: 2.9 G/DL (ref 3.2–4.9)
ALBUMIN/GLOB SERPL: 1.1 G/DL
ALP SERPL-CCNC: 1034 U/L (ref 30–99)
ALT SERPL-CCNC: 146 U/L (ref 2–50)
ANION GAP SERPL CALC-SCNC: 10 MMOL/L (ref 7–16)
AST SERPL-CCNC: 131 U/L (ref 12–45)
BILIRUB SERPL-MCNC: 0.3 MG/DL (ref 0.1–1.5)
BUN SERPL-MCNC: 6 MG/DL (ref 8–22)
CALCIUM SERPL-MCNC: 8.9 MG/DL (ref 8.4–10.2)
CHLORIDE SERPL-SCNC: 104 MMOL/L (ref 96–112)
CO2 SERPL-SCNC: 25 MMOL/L (ref 20–33)
CREAT SERPL-MCNC: 0.5 MG/DL (ref 0.5–1.4)
ERYTHROCYTE [DISTWIDTH] IN BLOOD BY AUTOMATED COUNT: 44.3 FL (ref 35.9–50)
GLOBULIN SER CALC-MCNC: 2.7 G/DL (ref 1.9–3.5)
GLUCOSE SERPL-MCNC: 195 MG/DL (ref 65–99)
GRAM STN SPEC: NORMAL
HCT VFR BLD AUTO: 30.2 % (ref 42–52)
HGB BLD-MCNC: 9.4 G/DL (ref 14–18)
LIPASE SERPL-CCNC: 281 U/L (ref 7–58)
MCH RBC QN AUTO: 27.4 PG (ref 27–33)
MCHC RBC AUTO-ENTMCNC: 31.1 G/DL (ref 33.7–35.3)
MCV RBC AUTO: 88 FL (ref 81.4–97.8)
PLATELET # BLD AUTO: 258 K/UL (ref 164–446)
PMV BLD AUTO: 9.6 FL (ref 9–12.9)
POTASSIUM SERPL-SCNC: 3.9 MMOL/L (ref 3.6–5.5)
PROT SERPL-MCNC: 5.6 G/DL (ref 6–8.2)
RBC # BLD AUTO: 3.43 M/UL (ref 4.7–6.1)
SIGNIFICANT IND 70042: NORMAL
SITE SITE: NORMAL
SODIUM SERPL-SCNC: 139 MMOL/L (ref 135–145)
SOURCE SOURCE: NORMAL
WBC # BLD AUTO: 5.1 K/UL (ref 4.8–10.8)

## 2020-07-03 PROCEDURE — 700102 HCHG RX REV CODE 250 W/ 637 OVERRIDE(OP): Performed by: INTERNAL MEDICINE

## 2020-07-03 PROCEDURE — 85027 COMPLETE CBC AUTOMATED: CPT

## 2020-07-03 PROCEDURE — 80053 COMPREHEN METABOLIC PANEL: CPT

## 2020-07-03 PROCEDURE — 99232 SBSQ HOSP IP/OBS MODERATE 35: CPT | Performed by: INTERNAL MEDICINE

## 2020-07-03 PROCEDURE — 36415 COLL VENOUS BLD VENIPUNCTURE: CPT

## 2020-07-03 PROCEDURE — 74176 CT ABD & PELVIS W/O CONTRAST: CPT

## 2020-07-03 PROCEDURE — 83690 ASSAY OF LIPASE: CPT

## 2020-07-03 PROCEDURE — A9270 NON-COVERED ITEM OR SERVICE: HCPCS | Performed by: INTERNAL MEDICINE

## 2020-07-03 PROCEDURE — 770006 HCHG ROOM/CARE - MED/SURG/GYN SEMI*

## 2020-07-03 PROCEDURE — 700111 HCHG RX REV CODE 636 W/ 250 OVERRIDE (IP): Performed by: INTERNAL MEDICINE

## 2020-07-03 PROCEDURE — 700105 HCHG RX REV CODE 258: Performed by: INTERNAL MEDICINE

## 2020-07-03 RX ADMIN — METRONIDAZOLE 500 MG: 500 TABLET ORAL at 13:28

## 2020-07-03 RX ADMIN — OXYCODONE HYDROCHLORIDE 10 MG: 10 TABLET ORAL at 21:27

## 2020-07-03 RX ADMIN — OXYCODONE HYDROCHLORIDE 10 MG: 10 TABLET ORAL at 00:13

## 2020-07-03 RX ADMIN — FINASTERIDE 5 MG: 5 TABLET, FILM COATED ORAL at 04:55

## 2020-07-03 RX ADMIN — METRONIDAZOLE 500 MG: 500 TABLET ORAL at 04:55

## 2020-07-03 RX ADMIN — CEFTRIAXONE SODIUM 2 G: 2 INJECTION, POWDER, FOR SOLUTION INTRAMUSCULAR; INTRAVENOUS at 04:55

## 2020-07-03 RX ADMIN — OXYCODONE HYDROCHLORIDE 10 MG: 10 TABLET ORAL at 07:50

## 2020-07-03 RX ADMIN — OXYCODONE HYDROCHLORIDE 10 MG: 10 TABLET ORAL at 13:28

## 2020-07-03 RX ADMIN — ATORVASTATIN CALCIUM 20 MG: 10 TABLET, FILM COATED ORAL at 21:27

## 2020-07-03 RX ADMIN — OXYCODONE HYDROCHLORIDE 10 MG: 10 TABLET ORAL at 04:54

## 2020-07-03 RX ADMIN — METRONIDAZOLE 500 MG: 500 TABLET ORAL at 21:27

## 2020-07-03 RX ADMIN — ACETAMINOPHEN 650 MG: 325 TABLET, FILM COATED ORAL at 07:50

## 2020-07-03 RX ADMIN — ACETAMINOPHEN 650 MG: 325 TABLET, FILM COATED ORAL at 00:13

## 2020-07-03 RX ADMIN — SODIUM CHLORIDE, POTASSIUM CHLORIDE, SODIUM LACTATE AND CALCIUM CHLORIDE: 600; 310; 30; 20 INJECTION, SOLUTION INTRAVENOUS at 05:11

## 2020-07-03 RX ADMIN — LISINOPRIL 40 MG: 20 TABLET ORAL at 04:55

## 2020-07-03 RX ADMIN — OXYCODONE HYDROCHLORIDE 10 MG: 10 TABLET ORAL at 18:16

## 2020-07-03 RX ADMIN — ZOLPIDEM TARTRATE 2.5 MG: 5 TABLET ORAL at 23:00

## 2020-07-03 ASSESSMENT — ENCOUNTER SYMPTOMS
SENSORY CHANGE: 0
CLAUDICATION: 0
NERVOUS/ANXIOUS: 0
SORE THROAT: 0
CONSTIPATION: 0
DEPRESSION: 0
HEADACHES: 0
PHOTOPHOBIA: 0
COUGH: 0
HEARTBURN: 0
VOMITING: 0
CHILLS: 0
INSOMNIA: 0
DIZZINESS: 0
DIARRHEA: 0
BLURRED VISION: 0
NAUSEA: 0
SHORTNESS OF BREATH: 0
FEVER: 0
MYALGIAS: 0
WEAKNESS: 0
SPEECH CHANGE: 0
ABDOMINAL PAIN: 1

## 2020-07-03 ASSESSMENT — COGNITIVE AND FUNCTIONAL STATUS - GENERAL
SUGGESTED CMS G CODE MODIFIER MOBILITY: CH
MOBILITY SCORE: 24
SUGGESTED CMS G CODE MODIFIER DAILY ACTIVITY: CH
DAILY ACTIVITIY SCORE: 24

## 2020-07-03 NOTE — PROGRESS NOTES
Irrigation with 20mL to IR-ROWDY drain completed, only 10mL of serosanguinous fluid noted from the drain; pt tolerated well.

## 2020-07-03 NOTE — PROGRESS NOTES
Hospital Medicine Daily Progress Note    Date of Service  7/3/2020    Chief Complaint  54 y.o. male admitted 7/1/2020 with abdominal pain.    Hospital Course    Patient initially presented to [facility] with 3 episodes of rectal bleeding and increasing abdominal pain.  He had elevated transaminases and lipase is post cholecystectomy in 5/2020 with Dr Concepcion.  He was transferred for GI evaluation for ERCP given lab abnormalities.  CT abdomen was done and CBD dilated and also a pelvic fluid collection found.  IR came to [facility] and placed ROWDY drain in the pelvis with relief of the fluid collection, no purulence noted and cultures pending.   Patient seen by GI and General Surgery.        Interval Problem Update  7/2 Patient feeling better today compared to prior to drain placement.  Pain is more at the drain site insertion and still with RUQ pain reproducible to palpation just below the ribcage.  Transaminases trending down and he is tolerating a clear liquid diet without issue.   7/3 Patient continues to show improvement, pain is present but less intense.  His drainage from the ROWDY is slowing down significantly so will re-image to see if fluid collection is still present.  First culture positive for NLFGNR - continue current antibiotics.    Consultants/Specialty  Tyrel - Noemí  GI - Pfau    Code Status  full    Disposition  tbd    Review of Systems  Review of Systems   Constitutional: Positive for malaise/fatigue. Negative for chills and fever.   HENT: Negative for congestion and sore throat.    Eyes: Negative for blurred vision and photophobia.   Respiratory: Negative for cough and shortness of breath.    Cardiovascular: Negative for chest pain, claudication and leg swelling.   Gastrointestinal: Positive for abdominal pain. Negative for constipation, diarrhea, heartburn, nausea and vomiting.   Genitourinary: Negative for dysuria and hematuria.   Musculoskeletal: Negative for joint pain and myalgias.   Skin: Negative for itching and  rash.   Neurological: Negative for dizziness, sensory change, speech change, weakness and headaches.   Psychiatric/Behavioral: Negative for depression. The patient is not nervous/anxious and does not have insomnia.         Physical Exam  Temp:  [36.6 °C (97.8 °F)-36.8 °C (98.3 °F)] 36.8 °C (98.3 °F)  Pulse:  [67-73] 67  Resp:  [17-18] 17  BP: (110-135)/(61-73) 127/65  SpO2:  [97 %-100 %] 98 %    Physical Exam  Vitals signs and nursing note reviewed.   Constitutional:       General: He is not in acute distress.     Appearance: Normal appearance.   HENT:      Head: Normocephalic and atraumatic.   Eyes:      General: No scleral icterus.     Extraocular Movements: Extraocular movements intact.   Neck:      Musculoskeletal: Normal range of motion and neck supple.   Cardiovascular:      Rate and Rhythm: Normal rate and regular rhythm.      Pulses: Normal pulses.      Heart sounds: Normal heart sounds. No murmur.   Pulmonary:      Effort: Pulmonary effort is normal. No respiratory distress.      Breath sounds: Normal breath sounds. No wheezing, rhonchi or rales.   Abdominal:      General: Abdomen is flat. Bowel sounds are normal. There is no distension.      Palpations: Abdomen is soft.      Tenderness: There is no rebound.      Comments: ROWDY drain in place     Musculoskeletal:         General: No swelling or tenderness.   Lymphadenopathy:      Cervical: No cervical adenopathy.   Skin:     Coloration: Skin is not jaundiced.      Findings: No erythema.   Neurological:      General: No focal deficit present.      Mental Status: He is alert and oriented to person, place, and time. Mental status is at baseline.      Cranial Nerves: No cranial nerve deficit.   Psychiatric:         Mood and Affect: Mood normal.         Behavior: Behavior normal.         Fluids    Intake/Output Summary (Last 24 hours) at 7/3/2020 1308  Last data filed at 7/3/2020 0900  Gross per 24 hour   Intake 700 ml   Output 40 ml   Net 660 ml        Laboratory  Recent Labs     07/01/20  0245  07/02/20  0441 07/02/20  1236 07/03/20  0542   WBC 7.3  --  5.4  --  5.1   RBC 3.99*  --  3.64*  --  3.43*   HEMOGLOBIN 10.9*   < > 9.9* 9.3* 9.4*   HEMATOCRIT 34.5*  --  32.5*  --  30.2*   MCV 86.5  --  89.3  --  88.0   MCH 27.3  --  27.2  --  27.4   MCHC 31.6*  --  30.5*  --  31.1*   RDW 43.2  --  45.0  --  44.3   PLATELETCT 312  --  274  --  258   MPV 9.4  --  9.8  --  9.6    < > = values in this interval not displayed.     Recent Labs     07/01/20  0245 07/02/20  0441 07/03/20  0542   SODIUM 132* 136 139   POTASSIUM 4.1 3.7 3.9   CHLORIDE 96 101 104   CO2 23 23 25   GLUCOSE 192* 172* 195*   BUN 15 8 6*   CREATININE 0.58 0.52 0.50   CALCIUM 9.4 9.1 8.9     Recent Labs     07/01/20  1312   APTT 26.6   INR 1.04         Recent Labs     07/01/20  0635   TRIGLYCERIDE 146   HDL 34*   LDL 60       Imaging  CT-ABDOMEN-PELVIS W/O   Final Result         1.  Resolving pelvic abscess with pigtail catheter drain in place.      2.  No new abscess or free fluid in the pelvis.      3.  No acute inflammatory or obstructive process identified in the upper abdomen.      4.  Cholecystectomy.      CT-DRAIN-PERITONEAL   Final Result      1.  CT guided pelvic abscess catheter drainage.   2.  The current plan is to obtain a follow-up CT in 5-7 days..      MR-FOREIGN FILM MRI   Final Result      OUTSIDE IMAGES-CT ABDOMEN /PELVIS   Final Result           Assessment/Plan  Pancreatitis- (present on admission)  Assessment & Plan  Tolerating regular diet.  Repeat Lipase 281 - pain free in this area  Transaminases trending down.    Elevated liver enzymes- (present on admission)  Assessment & Plan  -Alk phos, AST and ALT are all elevated but trending down significantly   total bilirubin is normal  GI consulting. - follow up as outpatient with Dr Dunaway  -Numbers have been elevated since his surgery but this is worse per ERP  -Repeat CMP in the morning    Pelvic abscess in male  (HCC)  Assessment & Plan  ROWDY drain placed under imaging guidance by Dr Leach - 70cc drained in past 24 hours, repeat CT to see if residual fluid collection present prior to removal of drain  Fluid collection smaller but still present and culture positive - keep drain in place.  ROWDY with serosanguinous drainage.  Dr Dowd consulted  Fluid culture showing NLFGNR      GI bleed- (present on admission)  Assessment & Plan  ? Hemorrhoidal bleed  -3 episodes of bright red blood per rectum prior to ED visit  -hemoglobin stable even with IV hydration  -Patient is hemodynamically stable  No further episodes of bleeding since transfer here.      Hypertension- (present on admission)  Assessment & Plan  -Continue home lisinopril  -Start PRN enalapril  -Adjust as needed    Dyslipidemia- (present on admission)  Assessment & Plan  -Continue home statin    BPH (benign prostatic hyperplasia)- (present on admission)  Assessment & Plan  -Continue home Proscar     Hyperglycemia- (present on admission)  Assessment & Plan  -Obtain Accu-Cheks      Normocytic anemia- (present on admission)  Assessment & Plan  -Currently with no sign of gross bleeding however he has not had another bowel movement  -Frequent hemoglobins    Chronic back pain- (present on admission)  Assessment & Plan  -No acute worsening, will be treated with meds for his abdominal pain       VTE prophylaxis: SCDs

## 2020-07-03 NOTE — PROGRESS NOTES
Received report, assumed pt care. Discussed POC. Since starting solid diet yesterday, pt has been requiring narcotic pain medicine every 3 hours. However, pt states he feels better, since he started eating. VSS. Will cont to monitor.

## 2020-07-03 NOTE — CARE PLAN
Problem: Pain Management  Goal: Pain level will decrease to patient's comfort goal  Note: Prn Oxycodone 10mg given per MAR for abdominal pain; heat pads also provided   Will continue to monitor and medicate as needed     Problem: Skin Integrity  Goal: Risk for impaired skin integrity will decrease  Outcome: PROGRESSING AS EXPECTED  Note: IR- ROWDY drain to lower right abd in place; irrigation with 20mL of sterile NS completed - pt tolerated well

## 2020-07-03 NOTE — CONSULTS
DATE OF SERVICE:  07/01/2020    SURGICAL CONSULTATION    PHYSICIAN REQUESTING CONSULTATION:  Mary Kate Marsh DO    REASON FOR CONSULTATION:  The patient is a 54-year-old male, who approximately   6 weeks ago underwent a laparoscopic cholecystectomy at [facility] by   Dr. Concepcion.  Subsequently, he has had ongoing abdominal pain.  It got worse at   this time after a bowel movement.  He actually had some blood in his stool.    He was evaluated at [facility].  An MRCP was performed, which   demonstrated dilated common duct, but no obvious choledocholithiasis.  He then   had a CT scan performed, which demonstrated him to have an abscess in his   pelvis.  He was subsequently transferred to [facility] for   treatment.    PAST MEDICAL HISTORY:  ILLNESSES:  Hypertension.    PAST SURGICAL HISTORY:  Laparoscopic cholecystectomy.    MEDICATIONS:  1.  Atorvastatin.  2.  Valium.  3.  Proscar.  4.  Robaxin.  5.  Percocet.  6.  Ambien.    ALLERGIES:  TO PENICILLIN.    SOCIAL HISTORY:  He stopped smoking.  He does not drink.    REVIEW OF SYSTEMS:  Otherwise, unremarkable.    PHYSICAL EXAMINATION:  VITAL SIGNS:  He is afebrile.  HEENT:  He is anicteric.  NECK:  Supple.  ABDOMEN:  Soft with tenderness both in the right upper quadrant as well as the   pelvis, which is actually worse.  EXTREMITIES:  Without edema.  NEUROLOGIC:  Intact.    LABORATORY DATA:  His white count is 7000.    His electrolytes are normal except for sodium 132.    His liver function tests are markedly elevated with SGOT and SGPT at ____, and   alkaline phosphatase of 1406 with a bilirubin of 0.7.    His lipase was 615.    IMPRESSION:  A 54-year-old male with pancreatitis, questionably gallstone   pancreatitis as well as pelvic abscess.    PLAN:  I will place a drain and GI is going to see him in regards to possibly   an ERCP.  Since the MRCP did not show any stones, it may be reasonable to   follow the labs to see if he passed a stone and also  follow his lipase   numbers.  I will follow along.       ____________________________________     MD CHELY CARR / CARLOS    DD:  07/03/2020 06:44:09  DT:  07/03/2020 08:11:35    D#:  [id number]  Job#:  [id number]

## 2020-07-04 LAB
ALBUMIN SERPL BCP-MCNC: 3.1 G/DL (ref 3.2–4.9)
ALBUMIN/GLOB SERPL: 1.2 G/DL
ALP SERPL-CCNC: 1198 U/L (ref 30–99)
ALT SERPL-CCNC: 152 U/L (ref 2–50)
ANION GAP SERPL CALC-SCNC: 10 MMOL/L (ref 7–16)
AST SERPL-CCNC: 177 U/L (ref 12–45)
BACTERIA WND AEROBE CULT: ABNORMAL
BACTERIA WND AEROBE CULT: ABNORMAL
BASOPHILS # BLD AUTO: 0.4 % (ref 0–1.8)
BASOPHILS # BLD: 0.02 K/UL (ref 0–0.12)
BILIRUB SERPL-MCNC: 0.4 MG/DL (ref 0.1–1.5)
BUN SERPL-MCNC: 5 MG/DL (ref 8–22)
CALCIUM SERPL-MCNC: 9.1 MG/DL (ref 8.4–10.2)
CHLORIDE SERPL-SCNC: 104 MMOL/L (ref 96–112)
CO2 SERPL-SCNC: 23 MMOL/L (ref 20–33)
CREAT SERPL-MCNC: 0.42 MG/DL (ref 0.5–1.4)
EOSINOPHIL # BLD AUTO: 0.12 K/UL (ref 0–0.51)
EOSINOPHIL NFR BLD: 2.2 % (ref 0–6.9)
ERYTHROCYTE [DISTWIDTH] IN BLOOD BY AUTOMATED COUNT: 43.8 FL (ref 35.9–50)
GLOBULIN SER CALC-MCNC: 2.6 G/DL (ref 1.9–3.5)
GLUCOSE SERPL-MCNC: 185 MG/DL (ref 65–99)
GRAM STN SPEC: ABNORMAL
HCT VFR BLD AUTO: 31.9 % (ref 42–52)
HGB BLD-MCNC: 9.9 G/DL (ref 14–18)
IMM GRANULOCYTES # BLD AUTO: 0.03 K/UL (ref 0–0.11)
IMM GRANULOCYTES NFR BLD AUTO: 0.6 % (ref 0–0.9)
LIPASE SERPL-CCNC: 233 U/L (ref 7–58)
LYMPHOCYTES # BLD AUTO: 1.83 K/UL (ref 1–4.8)
LYMPHOCYTES NFR BLD: 33.7 % (ref 22–41)
MCH RBC QN AUTO: 27 PG (ref 27–33)
MCHC RBC AUTO-ENTMCNC: 31 G/DL (ref 33.7–35.3)
MCV RBC AUTO: 87.2 FL (ref 81.4–97.8)
MONOCYTES # BLD AUTO: 0.42 K/UL (ref 0–0.85)
MONOCYTES NFR BLD AUTO: 7.7 % (ref 0–13.4)
NEUTROPHILS # BLD AUTO: 3.01 K/UL (ref 1.82–7.42)
NEUTROPHILS NFR BLD: 55.4 % (ref 44–72)
NRBC # BLD AUTO: 0 K/UL
NRBC BLD-RTO: 0 /100 WBC
PLATELET # BLD AUTO: 276 K/UL (ref 164–446)
PMV BLD AUTO: 10.1 FL (ref 9–12.9)
POTASSIUM SERPL-SCNC: 3.5 MMOL/L (ref 3.6–5.5)
PROT SERPL-MCNC: 5.7 G/DL (ref 6–8.2)
RBC # BLD AUTO: 3.66 M/UL (ref 4.7–6.1)
SIGNIFICANT IND 70042: ABNORMAL
SITE SITE: ABNORMAL
SODIUM SERPL-SCNC: 137 MMOL/L (ref 135–145)
SOURCE SOURCE: ABNORMAL
WBC # BLD AUTO: 5.4 K/UL (ref 4.8–10.8)

## 2020-07-04 PROCEDURE — 700105 HCHG RX REV CODE 258: Performed by: INTERNAL MEDICINE

## 2020-07-04 PROCEDURE — 83690 ASSAY OF LIPASE: CPT

## 2020-07-04 PROCEDURE — A9270 NON-COVERED ITEM OR SERVICE: HCPCS | Performed by: INTERNAL MEDICINE

## 2020-07-04 PROCEDURE — 85025 COMPLETE CBC W/AUTO DIFF WBC: CPT

## 2020-07-04 PROCEDURE — 80053 COMPREHEN METABOLIC PANEL: CPT

## 2020-07-04 PROCEDURE — 700102 HCHG RX REV CODE 250 W/ 637 OVERRIDE(OP): Performed by: INTERNAL MEDICINE

## 2020-07-04 PROCEDURE — 700111 HCHG RX REV CODE 636 W/ 250 OVERRIDE (IP): Performed by: INTERNAL MEDICINE

## 2020-07-04 PROCEDURE — 36415 COLL VENOUS BLD VENIPUNCTURE: CPT

## 2020-07-04 PROCEDURE — 770006 HCHG ROOM/CARE - MED/SURG/GYN SEMI*

## 2020-07-04 PROCEDURE — 99232 SBSQ HOSP IP/OBS MODERATE 35: CPT | Performed by: INTERNAL MEDICINE

## 2020-07-04 RX ADMIN — OXYCODONE HYDROCHLORIDE 10 MG: 10 TABLET ORAL at 06:05

## 2020-07-04 RX ADMIN — FINASTERIDE 5 MG: 5 TABLET, FILM COATED ORAL at 06:04

## 2020-07-04 RX ADMIN — OXYCODONE HYDROCHLORIDE 10 MG: 10 TABLET ORAL at 19:14

## 2020-07-04 RX ADMIN — METRONIDAZOLE 500 MG: 500 TABLET ORAL at 06:03

## 2020-07-04 RX ADMIN — OXYCODONE HYDROCHLORIDE 10 MG: 10 TABLET ORAL at 01:43

## 2020-07-04 RX ADMIN — OXYCODONE HYDROCHLORIDE 10 MG: 10 TABLET ORAL at 22:45

## 2020-07-04 RX ADMIN — ACETAMINOPHEN 650 MG: 325 TABLET, FILM COATED ORAL at 13:05

## 2020-07-04 RX ADMIN — LISINOPRIL 40 MG: 20 TABLET ORAL at 06:04

## 2020-07-04 RX ADMIN — DIAZEPAM 10 MG: 5 TABLET ORAL at 23:28

## 2020-07-04 RX ADMIN — OXYCODONE HYDROCHLORIDE 10 MG: 10 TABLET ORAL at 13:05

## 2020-07-04 RX ADMIN — OXYCODONE HYDROCHLORIDE 10 MG: 10 TABLET ORAL at 09:27

## 2020-07-04 RX ADMIN — CEFTRIAXONE SODIUM 2 G: 2 INJECTION, POWDER, FOR SOLUTION INTRAMUSCULAR; INTRAVENOUS at 06:05

## 2020-07-04 ASSESSMENT — ENCOUNTER SYMPTOMS
MYALGIAS: 0
HEADACHES: 0
PALPITATIONS: 0
DIZZINESS: 0
CONSTIPATION: 0
PHOTOPHOBIA: 0
ABDOMINAL PAIN: 1
SPEECH CHANGE: 0
COUGH: 0
CLAUDICATION: 0
SORE THROAT: 0
HEMOPTYSIS: 0
SENSORY CHANGE: 0
FEVER: 0
SHORTNESS OF BREATH: 0
DIARRHEA: 0
DEPRESSION: 0
NAUSEA: 0
NERVOUS/ANXIOUS: 0
WEAKNESS: 0
HEARTBURN: 0
CHILLS: 0
BLURRED VISION: 0
BLOOD IN STOOL: 0
INSOMNIA: 0
VOMITING: 0
BRUISES/BLEEDS EASILY: 0

## 2020-07-04 NOTE — CARE PLAN
Problem: Nutritional:  Goal: Achieve adequate nutritional intake  Description: Patient will consume >50% of meals  Outcome: MET     PO intake improved, % most meals per pt report. Added Boost Plus QD (moderate fat), Boost Breeze QD (fat free) between meals for weight gain per pt request. RD to follow weekly.

## 2020-07-04 NOTE — PROGRESS NOTES
Bedside report received from day RN.Pt is AAO x 4.Pt report minimal pain the the J.P. sites.POC discussed.All needs met at this time.Bed in low position.Call light within reach.Rounding in place.

## 2020-07-04 NOTE — CARE PLAN
Problem: Safety  Goal: Will remain free from injury  Outcome: PROGRESSING AS EXPECTED     Problem: Pain Management  Goal: Pain level will decrease to patient's comfort goal  Outcome: PROGRESSING AS EXPECTED  Intervention: Follow pain managment plan developed in collaboration with patient and Interdisciplinary Team  Note: Medicated pt per MAR for pain     Problem: Infection  Goal: Will remain free from infection  Outcome: PROGRESSING AS EXPECTED

## 2020-07-04 NOTE — PROGRESS NOTES
Trauma / Surgical Daily Progress Note    Date of Service  7/4/2020    Chief Complaint  54 y.o. male admitted 7/1/2020 with Acute pancreatitis    Interval Events  LFTs back up again. Tolerating reg diet.  ROWDY cultures are enterobacter. On abx. PO abx and DC from surgery standpoint. Await GI evaluation of increased LFTs.    Review of Systems  Review of Systems   Gastrointestinal: Positive for abdominal pain.        Vital Signs for last 24 hours  Temp:  [36.7 °C (98.1 °F)-36.9 °C (98.4 °F)] 36.7 °C (98.1 °F)  Pulse:  [60-72] 66  Resp:  [17-20] 19  BP: (136-151)/(64-78) 150/78  SpO2:  [93 %-100 %] 97 %    Hemodynamic parameters for last 24 hours       Respiratory Data     Respiration: 19, Pulse Oximetry: 97 %             Physical Exam  Physical Exam  Eyes:      General: No scleral icterus.  Neck:      Musculoskeletal: Neck supple.   Cardiovascular:      Rate and Rhythm: Normal rate.   Pulmonary:      Effort: Pulmonary effort is normal.   Abdominal:      General: There is no distension.      Palpations: Abdomen is soft.      Tenderness: There is abdominal tenderness.      Comments: ROWDY serous ? bilious   Skin:     General: Skin is warm.   Neurological:      Mental Status: He is alert.         Laboratory  Recent Results (from the past 24 hour(s))   Comp Metabolic Panel    Collection Time: 07/04/20  2:39 AM   Result Value Ref Range    Sodium 137 135 - 145 mmol/L    Potassium 3.5 (L) 3.6 - 5.5 mmol/L    Chloride 104 96 - 112 mmol/L    Co2 23 20 - 33 mmol/L    Anion Gap 10.0 7.0 - 16.0    Glucose 185 (H) 65 - 99 mg/dL    Bun 5 (L) 8 - 22 mg/dL    Creatinine 0.42 (L) 0.50 - 1.40 mg/dL    Calcium 9.1 8.4 - 10.2 mg/dL    AST(SGOT) 177 (H) 12 - 45 U/L    ALT(SGPT) 152 (H) 2 - 50 U/L    Alkaline Phosphatase 1198 (H) 30 - 99 U/L    Total Bilirubin 0.4 0.1 - 1.5 mg/dL    Albumin 3.1 (L) 3.2 - 4.9 g/dL    Total Protein 5.7 (L) 6.0 - 8.2 g/dL    Globulin 2.6 1.9 - 3.5 g/dL    A-G Ratio 1.2 g/dL   CBC WITH DIFFERENTIAL    Collection  Time: 07/04/20  2:39 AM   Result Value Ref Range    WBC 5.4 4.8 - 10.8 K/uL    RBC 3.66 (L) 4.70 - 6.10 M/uL    Hemoglobin 9.9 (L) 14.0 - 18.0 g/dL    Hematocrit 31.9 (L) 42.0 - 52.0 %    MCV 87.2 81.4 - 97.8 fL    MCH 27.0 27.0 - 33.0 pg    MCHC 31.0 (L) 33.7 - 35.3 g/dL    RDW 43.8 35.9 - 50.0 fL    Platelet Count 276 164 - 446 K/uL    MPV 10.1 9.0 - 12.9 fL    Neutrophils-Polys 55.40 44.00 - 72.00 %    Lymphocytes 33.70 22.00 - 41.00 %    Monocytes 7.70 0.00 - 13.40 %    Eosinophils 2.20 0.00 - 6.90 %    Basophils 0.40 0.00 - 1.80 %    Immature Granulocytes 0.60 0.00 - 0.90 %    Nucleated RBC 0.00 /100 WBC    Neutrophils (Absolute) 3.01 1.82 - 7.42 K/uL    Lymphs (Absolute) 1.83 1.00 - 4.80 K/uL    Monos (Absolute) 0.42 0.00 - 0.85 K/uL    Eos (Absolute) 0.12 0.00 - 0.51 K/uL    Baso (Absolute) 0.02 0.00 - 0.12 K/uL    Immature Granulocytes (abs) 0.03 0.00 - 0.11 K/uL    NRBC (Absolute) 0.00 K/uL   LIPASE    Collection Time: 07/04/20  2:39 AM   Result Value Ref Range    Lipase 233 (H) 7 - 58 U/L   ESTIMATED GFR    Collection Time: 07/04/20  2:39 AM   Result Value Ref Range    GFR If African American >60 >60 mL/min/1.73 m 2    GFR If Non African American >60 >60 mL/min/1.73 m 2       Fluids    Intake/Output Summary (Last 24 hours) at 7/4/2020 1140  Last data filed at 7/4/2020 0200  Gross per 24 hour   Intake 6350 ml   Output 30 ml   Net 6320 ml       Core Measures & Quality Metrics  Labs reviewed and Medications reviewed        DVT Prophylaxis: Enoxaparin (Lovenox)  DVT prophylaxis - mechanical: SCDs  Ulcer prophylaxis: Yes  Antibiotics: Treating active infection/contamination beyond 24 hours perioperative coverage  Assessed for rehab: Patient returned to prior level of function, rehabilitation not indicated at this time    ODILIA Score  ETOH Screening    Assessment/Plan  No new Assessment & Plan notes have been filed under this hospital service since the last note was generated.  Service: Surgery  General      Discussed patient condition with RN and Patient.  CRITICAL CARE TIME EXCLUDING PROCEDURES: 20    minutes

## 2020-07-04 NOTE — PROGRESS NOTES
Hospital Medicine Daily Progress Note    Date of Service  7/4/2020    Chief Complaint  54 y.o. male admitted 7/1/2020 with abdominal pain.    Hospital Course    Patient initially presented to [facility] with 3 episodes of rectal bleeding and increasing abdominal pain.  He had elevated transaminases and lipase is post cholecystectomy in 5/2020 with Dr Concepcion.  He was transferred for GI evaluation for ERCP given lab abnormalities.  CT abdomen was done and CBD dilated and also a pelvic fluid collection found.  IR came to [facility] and placed ROWDY drain in the pelvis with relief of the fluid collection, no purulence noted and cultures pending.   Patient seen by GI and General Surgery.        Interval Problem Update  7/2 Patient feeling better today compared to prior to drain placement.  Pain is more at the drain site insertion and still with RUQ pain reproducible to palpation just below the ribcage.  Transaminases trending down and he is tolerating a clear liquid diet without issue.   7/3 Patient continues to show improvement, pain is present but less intense.  His drainage from the ROWDY is slowing down significantly so will re-image to see if fluid collection is still present.  First culture positive for NLFGNR - continue current antibiotics.  7/4 Patient felt increased abdominal discomfort following breakfast this am, prior to this, labs were collected and showed increasing AP and transaminases again.  Discussed with GI and MRI with contrast recommended.  Will dc flagyl as culture showing enterobacter sn to rocephin.  ROWDY drain to remain in place given residual abscess on CT, depending on results of HIDA scan will dictate when to transition to oral cefotaxime.  Discussed with radiologist regarding MRI abdomen and no additional information would be gained from repeating MRI and HIDA recommended for evaluation of possible biliary leak.    Consultants/Specialty  Tyrel Dowd  GI - Pfau    Code Status  full    Disposition  tbd    Review of  Systems  Review of Systems   Constitutional: Positive for malaise/fatigue. Negative for chills and fever.   HENT: Negative for congestion and sore throat.    Eyes: Negative for blurred vision and photophobia.   Respiratory: Negative for cough and shortness of breath.    Cardiovascular: Negative for chest pain, claudication and leg swelling.   Gastrointestinal: Positive for abdominal pain (same). Negative for constipation, diarrhea, heartburn, nausea and vomiting.   Genitourinary: Negative for dysuria and hematuria.   Musculoskeletal: Negative for joint pain and myalgias.   Skin: Negative for itching and rash.   Neurological: Negative for dizziness, sensory change, speech change, weakness and headaches.   Psychiatric/Behavioral: Negative for depression. The patient is not nervous/anxious and does not have insomnia.         Physical Exam  Temp:  [36.7 °C (98.1 °F)-36.9 °C (98.4 °F)] 36.7 °C (98.1 °F)  Pulse:  [60-72] 66  Resp:  [17-20] 19  BP: (136-151)/(64-78) 150/78  SpO2:  [93 %-100 %] 97 %    Physical Exam  Vitals signs and nursing note reviewed.   Constitutional:       General: He is not in acute distress.     Appearance: Normal appearance.   HENT:      Head: Normocephalic and atraumatic.   Eyes:      General: No scleral icterus.     Extraocular Movements: Extraocular movements intact.   Neck:      Musculoskeletal: Normal range of motion and neck supple.   Cardiovascular:      Rate and Rhythm: Normal rate and regular rhythm.      Pulses: Normal pulses.      Heart sounds: Normal heart sounds. No murmur.   Pulmonary:      Effort: Pulmonary effort is normal. No respiratory distress.      Breath sounds: Normal breath sounds. No wheezing, rhonchi or rales.   Abdominal:      General: Abdomen is flat. Bowel sounds are normal. There is no distension.      Palpations: Abdomen is soft.      Tenderness: There is no rebound.      Comments: ROWDY drain in place     Musculoskeletal:         General: No swelling or tenderness.    Lymphadenopathy:      Cervical: No cervical adenopathy.   Skin:     Coloration: Skin is not jaundiced.      Findings: No erythema.   Neurological:      General: No focal deficit present.      Mental Status: He is alert and oriented to person, place, and time. Mental status is at baseline.      Cranial Nerves: No cranial nerve deficit.   Psychiatric:         Mood and Affect: Mood normal.         Behavior: Behavior normal.         Fluids    Intake/Output Summary (Last 24 hours) at 7/4/2020 1303  Last data filed at 7/4/2020 0200  Gross per 24 hour   Intake 6350 ml   Output 30 ml   Net 6320 ml       Laboratory  Recent Labs     07/02/20  0441 07/02/20  1236 07/03/20  0542 07/04/20  0239   WBC 5.4  --  5.1 5.4   RBC 3.64*  --  3.43* 3.66*   HEMOGLOBIN 9.9* 9.3* 9.4* 9.9*   HEMATOCRIT 32.5*  --  30.2* 31.9*   MCV 89.3  --  88.0 87.2   MCH 27.2  --  27.4 27.0   MCHC 30.5*  --  31.1* 31.0*   RDW 45.0  --  44.3 43.8   PLATELETCT 274  --  258 276   MPV 9.8  --  9.6 10.1     Recent Labs     07/02/20  0441 07/03/20  0542 07/04/20  0239   SODIUM 136 139 137   POTASSIUM 3.7 3.9 3.5*   CHLORIDE 101 104 104   CO2 23 25 23   GLUCOSE 172* 195* 185*   BUN 8 6* 5*   CREATININE 0.52 0.50 0.42*   CALCIUM 9.1 8.9 9.1     Recent Labs     07/01/20  1312   APTT 26.6   INR 1.04               Imaging  CT-ABDOMEN-PELVIS W/O   Final Result         1.  Resolving pelvic abscess with pigtail catheter drain in place.      2.  No new abscess or free fluid in the pelvis.      3.  No acute inflammatory or obstructive process identified in the upper abdomen.      4.  Cholecystectomy.      CT-DRAIN-PERITONEAL   Final Result      1.  CT guided pelvic abscess catheter drainage.   2.  The current plan is to obtain a follow-up CT in 5-7 days..      MR-FOREIGN FILM MRI   Final Result      OUTSIDE IMAGES-CT ABDOMEN /PELVIS   Final Result      MR-ABDOMEN-WITH & W/O    (Results Pending)        Assessment/Plan  Pancreatitis- (present on admission)  Assessment &  Plan  Tolerating regular diet.  Repeat Lipase 281 - pain free in this area  Transaminases trending down.    Elevated liver enzymes- (present on admission)  Assessment & Plan  -Alk phos, AST and ALT are all elevated but trending back up, d/w Dr Morris and MRI repeat with contrast recommended.  GI consulting. - follow up as outpatient with Dr Dunaway  -Numbers have been elevated since his surgery but this is worse per ERP  -Repeat CMP in the morning    Pelvic abscess in male (HCC)  Assessment & Plan  ROWDY drain placed under imaging guidance by Dr Leach - 70cc drained in past 24 hours, repeat CT to see if residual fluid collection present prior to removal of drain  Fluid collection smaller but still present and culture positive - keep drain in place.  ROWDY with serosanguinous drainage.  Dr Dowd consulted  Fluid culture showing enterobacter sn to rocephin, dc flagyl        GI bleed- (present on admission)  Assessment & Plan  ? Hemorrhoidal bleed  -3 episodes of bright red blood per rectum prior to ED visit  -hemoglobin stable even with IV hydration  -Patient is hemodynamically stable  No further episodes of bleeding since transfer here.      Hypertension- (present on admission)  Assessment & Plan  -Continue home lisinopril  -Start PRN enalapril  -Adjust as needed    Dyslipidemia- (present on admission)  Assessment & Plan  -Continue home statin    BPH (benign prostatic hyperplasia)- (present on admission)  Assessment & Plan  -Continue home Proscar     Hyperglycemia- (present on admission)  Assessment & Plan  -Obtain Accu-Cheks      Normocytic anemia- (present on admission)  Assessment & Plan  -Currently with no sign of gross bleeding however he has not had another bowel movement  -Frequent hemoglobins    Chronic back pain- (present on admission)  Assessment & Plan  -No acute worsening, will be treated with meds for his abdominal pain       VTE prophylaxis: SCDs

## 2020-07-04 NOTE — PROGRESS NOTES
Gastroenterology Progress Note     Author: Jericho Morris M.D.   Date & Time Created: 7/4/2020 4:18 PM    Chief Complaint:  Abdominal pain and pelvic abscess following laparoscopic cholecystectomy 6 weeks ago.    Interval History:  CT abdomen yesterday revealed reduced size of pelvic abscess.  Patient had upper abdominal discomfort after breakfast this morning.  LFTs on the rise again.  Denies fever or chills.  Denies melena or hematochezia.    Review of Systems:  Review of Systems   Constitutional: Positive for malaise/fatigue. Negative for chills and fever.   Respiratory: Negative for cough, hemoptysis and shortness of breath.    Cardiovascular: Negative for chest pain and palpitations.   Gastrointestinal: Positive for abdominal pain. Negative for blood in stool, diarrhea, heartburn, melena, nausea and vomiting.   Genitourinary: Negative for dysuria.   Skin: Negative for rash.   Neurological: Negative for dizziness.   Endo/Heme/Allergies: Does not bruise/bleed easily.       Current Facility-Administered Medications:   •  atorvastatin (LIPITOR) tablet 20 mg, 20 mg, Oral, Nightly, Ty Munoz DSantinoO., 20 mg at 07/03/20 2127  •  diazePAM (VALIUM) tablet 10 mg, 10 mg, Oral, Q6HRS PRN, STEPAN LomaxO.  •  finasteride (PROSCAR) tablet 5 mg, 5 mg, Oral, DAILY, TAM Lomax.O., 5 mg at 07/04/20 0604  •  lisinopril (PRINIVIL) tablet 40 mg, 40 mg, Oral, DAILY, STEPAN LomaxO., 40 mg at 07/04/20 0604  •  methocarbamol (ROBAXIN) tablet 750 mg, 750 mg, Oral, BID PRN, STEPAN LomaxO.  •  zolpidem (AMBIEN) tablet 2.5 mg, 2.5 mg, Oral, HS PRN - MR X 1, STEPAN LomaxO., 2.5 mg at 07/03/20 2300  •  senna-docusate (PERICOLACE or SENOKOT S) 8.6-50 MG per tablet 2 Tab, 2 Tab, Oral, BID, 2 Tab at 07/02/20 0616 **AND** polyethylene glycol/lytes (MIRALAX) PACKET 1 Packet, 1 Packet, Oral, QDAY PRN **AND** magnesium hydroxide (MILK OF MAGNESIA) suspension 30 mL, 30 mL, Oral, QDAY PRN **AND** bisacodyl  (DULCOLAX) suppository 10 mg, 10 mg, Rectal, QDAY PRN, Ty Munoz D.O.  •  lactated ringers infusion, , Intravenous, Continuous, Mary Kate Marsh D.O., Last Rate: 125 mL/hr at 07/03/20 0511  •  acetaminophen (TYLENOL) tablet 650 mg, 650 mg, Oral, Q6HRS PRN, Ty Munoz D.O., 650 mg at 07/04/20 1305  •  Notify provider if pain remains uncontrolled, , , CONTINUOUS **AND** Use the numeric rating scale (NRS-11) on regular floors and Critical-Care Pain Observation Tool (CPOT) on ICUs/Trauma to assess pain, , , CONTINUOUS **AND** Pulse Ox (Oximetry), , , CONTINUOUS **AND** Pharmacy Consult Request ...Pain Management Review 1 Each, 1 Each, Other, PHARMACY TO DOSE **AND** If patient difficult to arouse and/or has respiratory depression, stop any opiates that are currently infusing and call a Rapid Response., , , CONTINUOUS **AND** oxyCODONE immediate-release (ROXICODONE) tablet 5 mg, 5 mg, Oral, Q3HRS PRN **AND** oxyCODONE immediate-release (ROXICODONE) tablet 10 mg, 10 mg, Oral, Q3HRS PRN, 10 mg at 07/04/20 1305 **AND** HYDROmorphone pf (DILAUDID) injection 0.5 mg, 0.5 mg, Intravenous, Q3HRS PRN, Ty Munoz D.O., 0.5 mg at 07/01/20 1345  •  enalaprilat (VASOTEC) injection 1.25 mg, 1.25 mg, Intravenous, Q6HRS PRN, Ty Munoz D.O.  •  ondansetron (ZOFRAN) syringe/vial injection 4 mg, 4 mg, Intravenous, Q4HRS PRN, Ty Munoz D.O.  •  ondansetron (ZOFRAN ODT) dispertab 4 mg, 4 mg, Oral, Q4HRS PRN, Ty Munoz D.O.  •  promethazine (PHENERGAN) tablet 12.5-25 mg, 12.5-25 mg, Oral, Q4HRS PRN, Ty Munoz D.O.  •  promethazine (PHENERGAN) suppository 12.5-25 mg, 12.5-25 mg, Rectal, Q4HRS PRN, Ty Munoz D.O.  •  prochlorperazine (COMPAZINE) injection 5-10 mg, 5-10 mg, Intravenous, Q4HRS PRN, Ty Munoz D.O.  •  cefTRIAXone (ROCEPHIN) 2 g in  mL IVPB, 2 g, Intravenous, Q24HRS, TAM Lomax.OSantino, Stopped at 07/04/20 0635     Physical Exam:  Physical Exam   Constitutional: He  is oriented to person, place, and time. He appears well-developed and well-nourished.   HENT:   Head: Atraumatic.   Eyes: No scleral icterus.   Neck: No thyromegaly present.   Cardiovascular: Normal rate and regular rhythm.   Pulmonary/Chest: Effort normal and breath sounds normal. No respiratory distress.   Abdominal: Soft. Bowel sounds are normal. He exhibits no distension and no mass. There is no abdominal tenderness. There is no rebound and no guarding.   Musculoskeletal:         General: No edema.   Neurological: He is alert and oriented to person, place, and time.   Skin: Skin is warm and dry.   Psychiatric: He has a normal mood and affect.       Labs:          Recent Labs     07/02/20 0441 07/03/20 0542 07/04/20  0239   SODIUM 136 139 137   POTASSIUM 3.7 3.9 3.5*   CHLORIDE 101 104 104   CO2 23 25 23   BUN 8 6* 5*   CREATININE 0.52 0.50 0.42*   CALCIUM 9.1 8.9 9.1     Recent Labs     07/02/20 0441 07/03/20  0542 07/04/20  0239   ALTSGPT 198* 146* 152*   ASTSGOT 148* 131* 177*   ALKPHOSPHAT 992* 1034* 1198*   TBILIRUBIN 0.3 0.3 0.4   LIPASE  --  281* 233*   GLUCOSE 172* 195* 185*     Recent Labs     07/02/20 0441 07/02/20  1236 07/03/20 0542 07/04/20  0239   RBC 3.64*  --  3.43* 3.66*   HEMOGLOBIN 9.9* 9.3* 9.4* 9.9*   HEMATOCRIT 32.5*  --  30.2* 31.9*   PLATELETCT 274  --  258 276     Recent Labs     07/02/20 0441 07/03/20 0542 07/04/20  0239   WBC 5.4 5.1 5.4   NEUTSPOLYS  --   --  55.40   LYMPHOCYTES  --   --  33.70   MONOCYTES  --   --  7.70   EOSINOPHILS  --   --  2.20   BASOPHILS  --   --  0.40   ASTSGOT 148* 131* 177*   ALTSGPT 198* 146* 152*   ALKPHOSPHAT 992* 1034* 1198*   TBILIRUBIN 0.3 0.3 0.4       Imaging:  CT abdomen reviewed. 7/03/2020     No free air is present.  The liver, spleen, pancreas, adrenal glands, and kidneys are normal in noncontrast CT appearance.  The gallbladder is absent.  There is no upper abdominal adenopathy.  The visualized bowel is without evidence of  obstruction.  Sigmoid diverticulosis is present. There is no evidence of acute diverticulitis.     Imaging of the pelvis shows no pelvic adenopathy.  A right hemipelvic pigtail catheter is in place. There is minimal residual abscess in the pelvis measuring approximately 4.2 x 2.7 x 2.7 cm. This is significantly smaller than the predrainage CT examination.  No free fluid is present.  No new pelvic fluid collection is present.  The bladder is grossly normal      Assessment:  54-year-old male with 6 weeks history of upper and lower abdominal pain following laparoscopic cholecystectomy.  Subsequent imaging studies revealed pelvic abscess which has been drained and patient is currently on antibiotics.  His liver enzymes have been elevated in a predominantly cholestatic pattern with normal bilirubin.  Although outside MRCP revealed dilated bile duct no obstructive stone was identified.  Therefore, abnormal liver enzymes could be secondary to intrahepatic cholestasis due to intra-abdominal sepsis.  Since patient is experiencing recurrent pain, will proceed with repeat MRI abdomen with contrast to rule out biliary obstructive process and also to exclude other etiologies including perihepatic or liver abscess.  He will also require HIDA scan to rule out bile leak though less likely in the absence of significant peritoneal signs.  If imaging study is inconclusive, he may eventually need ERCP for further diagnostic and therapeutic purpose.    Problems:  1.  Cholestatic hepatitis  2.  Pelvic abscess  3.  Rule out bile leak  4.  Rule out biliary obstruction    Plan:  1.  HIDA scan  2.  MRI abdomen with contrast  3.  Monitor LFTs  4.  ERCP based on LFT findings and imaging studies.    Quality-Core Measures

## 2020-07-04 NOTE — PROGRESS NOTES
IR- ROWDY drain to back right pelvis irrigation with 20mL of sterile NS completed - pt tolerated well.

## 2020-07-05 ENCOUNTER — APPOINTMENT (OUTPATIENT)
Dept: RADIOLOGY | Facility: MEDICAL CENTER | Age: 55
DRG: 862 | End: 2020-07-05
Attending: INTERNAL MEDICINE
Payer: MEDICAID

## 2020-07-05 LAB
ALBUMIN SERPL BCP-MCNC: 3.6 G/DL (ref 3.2–4.9)
ALBUMIN/GLOB SERPL: 1.3 G/DL
ALP SERPL-CCNC: 1725 U/L (ref 30–99)
ALT SERPL-CCNC: 201 U/L (ref 2–50)
ANION GAP SERPL CALC-SCNC: 13 MMOL/L (ref 7–16)
AST SERPL-CCNC: 300 U/L (ref 12–45)
BACTERIA FLD AEROBE CULT: ABNORMAL
BACTERIA FLD AEROBE CULT: ABNORMAL
BASOPHILS # BLD AUTO: 0.6 % (ref 0–1.8)
BASOPHILS # BLD: 0.04 K/UL (ref 0–0.12)
BILIRUB SERPL-MCNC: 0.7 MG/DL (ref 0.1–1.5)
BUN SERPL-MCNC: 6 MG/DL (ref 8–22)
CALCIUM SERPL-MCNC: 9.7 MG/DL (ref 8.4–10.2)
CHLORIDE SERPL-SCNC: 102 MMOL/L (ref 96–112)
CO2 SERPL-SCNC: 23 MMOL/L (ref 20–33)
CREAT SERPL-MCNC: 0.44 MG/DL (ref 0.5–1.4)
EOSINOPHIL # BLD AUTO: 0.11 K/UL (ref 0–0.51)
EOSINOPHIL NFR BLD: 1.7 % (ref 0–6.9)
ERYTHROCYTE [DISTWIDTH] IN BLOOD BY AUTOMATED COUNT: 44.8 FL (ref 35.9–50)
GLOBULIN SER CALC-MCNC: 2.8 G/DL (ref 1.9–3.5)
GLUCOSE SERPL-MCNC: 233 MG/DL (ref 65–99)
GRAM STN SPEC: ABNORMAL
HCT VFR BLD AUTO: 38 % (ref 42–52)
HGB BLD-MCNC: 11.5 G/DL (ref 14–18)
IMM GRANULOCYTES # BLD AUTO: 0.02 K/UL (ref 0–0.11)
IMM GRANULOCYTES NFR BLD AUTO: 0.3 % (ref 0–0.9)
LYMPHOCYTES # BLD AUTO: 2.21 K/UL (ref 1–4.8)
LYMPHOCYTES NFR BLD: 34.6 % (ref 22–41)
MCH RBC QN AUTO: 27 PG (ref 27–33)
MCHC RBC AUTO-ENTMCNC: 30.3 G/DL (ref 33.7–35.3)
MCV RBC AUTO: 89.2 FL (ref 81.4–97.8)
MONOCYTES # BLD AUTO: 0.36 K/UL (ref 0–0.85)
MONOCYTES NFR BLD AUTO: 5.6 % (ref 0–13.4)
NEUTROPHILS # BLD AUTO: 3.64 K/UL (ref 1.82–7.42)
NEUTROPHILS NFR BLD: 57.2 % (ref 44–72)
NRBC # BLD AUTO: 0 K/UL
NRBC BLD-RTO: 0 /100 WBC
PLATELET # BLD AUTO: 326 K/UL (ref 164–446)
PMV BLD AUTO: 9.8 FL (ref 9–12.9)
POTASSIUM SERPL-SCNC: 3.5 MMOL/L (ref 3.6–5.5)
PROT SERPL-MCNC: 6.4 G/DL (ref 6–8.2)
RBC # BLD AUTO: 4.26 M/UL (ref 4.7–6.1)
SIGNIFICANT IND 70042: ABNORMAL
SITE SITE: ABNORMAL
SODIUM SERPL-SCNC: 138 MMOL/L (ref 135–145)
SOURCE SOURCE: ABNORMAL
WBC # BLD AUTO: 6.4 K/UL (ref 4.8–10.8)

## 2020-07-05 PROCEDURE — 700102 HCHG RX REV CODE 250 W/ 637 OVERRIDE(OP): Performed by: INTERNAL MEDICINE

## 2020-07-05 PROCEDURE — 700111 HCHG RX REV CODE 636 W/ 250 OVERRIDE (IP): Performed by: INTERNAL MEDICINE

## 2020-07-05 PROCEDURE — 700105 HCHG RX REV CODE 258: Performed by: INTERNAL MEDICINE

## 2020-07-05 PROCEDURE — 85025 COMPLETE CBC W/AUTO DIFF WBC: CPT

## 2020-07-05 PROCEDURE — 99232 SBSQ HOSP IP/OBS MODERATE 35: CPT | Performed by: INTERNAL MEDICINE

## 2020-07-05 PROCEDURE — A9537 TC99M MEBROFENIN: HCPCS

## 2020-07-05 PROCEDURE — 770006 HCHG ROOM/CARE - MED/SURG/GYN SEMI*

## 2020-07-05 PROCEDURE — 36415 COLL VENOUS BLD VENIPUNCTURE: CPT

## 2020-07-05 PROCEDURE — 80053 COMPREHEN METABOLIC PANEL: CPT

## 2020-07-05 PROCEDURE — 84075 ASSAY ALKALINE PHOSPHATASE: CPT

## 2020-07-05 PROCEDURE — 74183 MRI ABD W/O CNTR FLWD CNTR: CPT

## 2020-07-05 PROCEDURE — A9270 NON-COVERED ITEM OR SERVICE: HCPCS | Performed by: INTERNAL MEDICINE

## 2020-07-05 PROCEDURE — A9576 INJ PROHANCE MULTIPACK: HCPCS | Performed by: INTERNAL MEDICINE

## 2020-07-05 PROCEDURE — 700117 HCHG RX CONTRAST REV CODE 255: Performed by: INTERNAL MEDICINE

## 2020-07-05 PROCEDURE — 84080 ASSAY ALKALINE PHOSPHATASES: CPT

## 2020-07-05 RX ADMIN — FINASTERIDE 5 MG: 5 TABLET, FILM COATED ORAL at 05:49

## 2020-07-05 RX ADMIN — DIAZEPAM 10 MG: 5 TABLET ORAL at 23:19

## 2020-07-05 RX ADMIN — OXYCODONE HYDROCHLORIDE 10 MG: 10 TABLET ORAL at 17:28

## 2020-07-05 RX ADMIN — GADOTERIDOL 20 ML: 279.3 INJECTION, SOLUTION INTRAVENOUS at 15:30

## 2020-07-05 RX ADMIN — CEFTRIAXONE SODIUM 2 G: 2 INJECTION, POWDER, FOR SOLUTION INTRAMUSCULAR; INTRAVENOUS at 05:49

## 2020-07-05 RX ADMIN — OXYCODONE HYDROCHLORIDE 10 MG: 10 TABLET ORAL at 20:32

## 2020-07-05 RX ADMIN — OXYCODONE HYDROCHLORIDE 10 MG: 10 TABLET ORAL at 13:45

## 2020-07-05 RX ADMIN — LISINOPRIL 40 MG: 20 TABLET ORAL at 05:49

## 2020-07-05 RX ADMIN — HYDROMORPHONE HYDROCHLORIDE 0.5 MG: 1 INJECTION, SOLUTION INTRAMUSCULAR; INTRAVENOUS; SUBCUTANEOUS at 22:14

## 2020-07-05 ASSESSMENT — ENCOUNTER SYMPTOMS
CHILLS: 0
HEARTBURN: 0
INSOMNIA: 0
NERVOUS/ANXIOUS: 0
HEADACHES: 0
CONSTIPATION: 0
DEPRESSION: 0
WEAKNESS: 0
FEVER: 0
SPEECH CHANGE: 0
SENSORY CHANGE: 0
DIZZINESS: 0
CLAUDICATION: 0
DIARRHEA: 0
SHORTNESS OF BREATH: 0
PHOTOPHOBIA: 0
HEMOPTYSIS: 0
SORE THROAT: 0
PALPITATIONS: 0
MYALGIAS: 0
VOMITING: 0
COUGH: 0
BLURRED VISION: 0
BLOOD IN STOOL: 0
ABDOMINAL PAIN: 1
NAUSEA: 0
BRUISES/BLEEDS EASILY: 0

## 2020-07-05 NOTE — PROGRESS NOTES
"Bedside report completed w/ Kesha/RN.  Assumed pt care. Patient in bed, resting, in no apparent distress.  Safety precautions in place. Call light & personal belongings within reach.  Plan of care discussed.  Patient reports pain as \"tolerable\" at this time. No needs expressed.   "

## 2020-07-05 NOTE — PROGRESS NOTES
[facility] Medicine Daily Progress Note    Date of Service  7/5/2020    Chief Complaint  54 y.o. male admitted 7/1/2020 with abdominal pain.    Hospital Course    Patient initially presented to [facility] with 3 episodes of rectal bleeding and increasing abdominal pain.  He had elevated transaminases and lipase is post cholecystectomy in 5/2020 with Dr Cnocepcion.  He was transferred for GI evaluation for ERCP given lab abnormalities.  CT abdomen was done and CBD dilated and also a pelvic fluid collection found.  IR came to [facility] and placed ROWDY drain in the pelvis with relief of the fluid collection, no purulence noted and cultures pending.   Patient seen by GI and General Surgery.        Interval Problem Update  7/2 Patient feeling better today compared to prior to drain placement.  Pain is more at the drain site insertion and still with RUQ pain reproducible to palpation just below the ribcage.  Transaminases trending down and he is tolerating a clear liquid diet without issue.   7/3 Patient continues to show improvement, pain is present but less intense.  His drainage from the ROWDY is slowing down significantly so will re-image to see if fluid collection is still present.  First culture positive for NLFGNR - continue current antibiotics.  7/4 Patient felt increased abdominal discomfort following breakfast this am, prior to this, labs were collected and showed increasing AP and transaminases again.  Discussed with GI and MRI with contrast recommended.  Will dc flagyl as culture showing enterobacter sn to rocephin.  ROWDY drain to remain in place given residual abscess on CT, depending on results of HIDA scan will dictate when to transition to oral cefotaxime.  Discussed with radiologist regarding MRI abdomen and no additional information would be gained from repeating MRI and HIDA recommended for evaluation of possible biliary leak.  7/5 Patient feeling about same, hungry when seen as was NPO for HIDA.  No evidence of biliary leak on  HIDA, MRI liver with contrast pending to eval for other sources of transaminitis and elevated alk phos.      Consultants/Specialty  Tyrel - Noemí  GI - DHA    Code Status  full    Disposition  tbd    Review of Systems  Review of Systems   Constitutional: Positive for malaise/fatigue. Negative for chills and fever.   HENT: Negative for congestion and sore throat.    Eyes: Negative for blurred vision and photophobia.   Respiratory: Negative for cough and shortness of breath.    Cardiovascular: Negative for chest pain, claudication and leg swelling.   Gastrointestinal: Positive for abdominal pain (same). Negative for constipation, diarrhea, heartburn, nausea and vomiting.   Genitourinary: Negative for dysuria and hematuria.   Musculoskeletal: Negative for joint pain and myalgias.   Skin: Negative for itching and rash.   Neurological: Negative for dizziness, sensory change, speech change, weakness and headaches.   Psychiatric/Behavioral: Negative for depression. The patient is not nervous/anxious and does not have insomnia.         Physical Exam  Temp:  [36.4 °C (97.6 °F)-36.8 °C (98.2 °F)] 36.8 °C (98.2 °F)  Pulse:  [64-69] 67  Resp:  [17-20] 17  BP: (136-160)/(58-82) 154/82  SpO2:  [96 %-99 %] 98 %    Physical Exam  Vitals signs and nursing note reviewed.   Constitutional:       General: He is not in acute distress.     Appearance: Normal appearance.   HENT:      Head: Normocephalic and atraumatic.   Eyes:      General: No scleral icterus.     Extraocular Movements: Extraocular movements intact.   Neck:      Musculoskeletal: Normal range of motion and neck supple.   Cardiovascular:      Rate and Rhythm: Normal rate and regular rhythm.      Pulses: Normal pulses.      Heart sounds: Normal heart sounds. No murmur.   Pulmonary:      Effort: Pulmonary effort is normal. No respiratory distress.      Breath sounds: Normal breath sounds. No wheezing, rhonchi or rales.   Abdominal:      General: Abdomen is flat. Bowel sounds are  normal. There is no distension.      Palpations: Abdomen is soft.      Tenderness: There is no rebound.      Comments: ROWDY drain in place     Musculoskeletal:         General: No swelling or tenderness.   Lymphadenopathy:      Cervical: No cervical adenopathy.   Skin:     Coloration: Skin is not jaundiced.      Findings: No erythema.   Neurological:      General: No focal deficit present.      Mental Status: He is alert and oriented to person, place, and time. Mental status is at baseline.      Cranial Nerves: No cranial nerve deficit.   Psychiatric:         Mood and Affect: Mood normal.         Behavior: Behavior normal.         Fluids    Intake/Output Summary (Last 24 hours) at 7/5/2020 1330  Last data filed at 7/5/2020 0757  Gross per 24 hour   Intake 4183.75 ml   Output 15 ml   Net 4168.75 ml       Laboratory  Recent Labs     07/03/20  0542 07/04/20  0239 07/05/20  0542   WBC 5.1 5.4 6.4   RBC 3.43* 3.66* 4.26*   HEMOGLOBIN 9.4* 9.9* 11.5*   HEMATOCRIT 30.2* 31.9* 38.0*   MCV 88.0 87.2 89.2   MCH 27.4 27.0 27.0   MCHC 31.1* 31.0* 30.3*   RDW 44.3 43.8 44.8   PLATELETCT 258 276 326   MPV 9.6 10.1 9.8     Recent Labs     07/03/20  0542 07/04/20  0239 07/05/20  0542   SODIUM 139 137 138   POTASSIUM 3.9 3.5* 3.5*   CHLORIDE 104 104 102   CO2 25 23 23   GLUCOSE 195* 185* 233*   BUN 6* 5* 6*   CREATININE 0.50 0.42* 0.44*   CALCIUM 8.9 9.1 9.7                   Imaging  NM-HEPATOBILIARY SCAN   Final Result      Normal postcholecystectomy hepatobiliary scan. No evidence of bile leak.      CT-ABDOMEN-PELVIS W/O   Final Result         1.  Resolving pelvic abscess with pigtail catheter drain in place.      2.  No new abscess or free fluid in the pelvis.      3.  No acute inflammatory or obstructive process identified in the upper abdomen.      4.  Cholecystectomy.      CT-DRAIN-PERITONEAL   Final Result      1.  CT guided pelvic abscess catheter drainage.   2.  The current plan is to obtain a follow-up CT in 5-7 days..       MR-FOREIGN FILM MRI   Final Result      OUTSIDE IMAGES-CT ABDOMEN /PELVIS   Final Result      MR-ABDOMEN-WITH & W/O    (Results Pending)        Assessment/Plan  Pancreatitis- (present on admission)  Assessment & Plan  Tolerating regular diet.  Repeat Lipase 281 - pain free in this area  Transaminases trending down.    Elevated liver enzymes- (present on admission)  Assessment & Plan  -Alk phos, AST and ALT are all elevated and still trending up, d/w Dr Morris and MRI repeat with contrast recommended.  GI consulting.   MRI abdomen pending, HIDA scan shows no evidence of biliary leak.  -Repeat CMP in the morning    Pelvic abscess in male (HCC)  Assessment & Plan  ROWDY drain placed under imaging guidance by Dr Leach - 70cc drained in past 24 hours, repeat CT to see if residual fluid collection present prior to removal of drain  Fluid collection smaller but still present and culture positive - keep drain in place.  ROWDY with serosanguinous drainage.  Fluid culture showing enterobacter sn to rocephin, dc flagyl        GI bleed- (present on admission)  Assessment & Plan  ? Hemorrhoidal bleed  -3 episodes of bright red blood per rectum prior to ED visit  -hemoglobin stable even with IV hydration  -Patient is hemodynamically stable  No further episodes of bleeding since transfer here.      Hypertension- (present on admission)  Assessment & Plan  -Continue home lisinopril  -Start PRN enalapril  -Adjust as needed    Dyslipidemia- (present on admission)  Assessment & Plan  -Continue home statin    BPH (benign prostatic hyperplasia)- (present on admission)  Assessment & Plan  -Continue home Proscar     Hyperglycemia- (present on admission)  Assessment & Plan  -Obtain Accu-Cheks      Normocytic anemia- (present on admission)  Assessment & Plan  -Currently with no sign of gross bleeding however he has not had another bowel movement  -Frequent hemoglobins    Chronic back pain- (present on admission)  Assessment & Plan  -No acute  worsening, will be treated with meds for his abdominal pain       VTE prophylaxis: SCDs

## 2020-07-05 NOTE — PROGRESS NOTES
ROWDY/IR drain flushed with 20ml sterile saline.  10ml emptied from ROWDY.  Patient has been NPO since 0200 for HIDA scan scheduled for this morning.  Patient states pain is tolerable at this time.

## 2020-07-05 NOTE — CARE PLAN
Problem: Safety  Goal: Will remain free from injury  Outcome: PROGRESSING AS EXPECTED  Note: Bed in low and locked position.  Side rails up X2.  Call light remains in reach of patient.  Hourly rounding continues.     Problem: Pain Management  Goal: Pain level will decrease to patient's comfort goal  Outcome: PROGRESSING AS EXPECTED  Note: Patient states pain controlled with oxycodone 10mg po.

## 2020-07-05 NOTE — PROGRESS NOTES
Assumed care of patient at 1900.  Patient is alert and oriented, resting in bed.  Patient states he just had a loose brown BM.  IR ROWDY drain to contained suction.  Extra pillow given for comfort.  Patient denies further needs at this time.

## 2020-07-05 NOTE — PROGRESS NOTES
Gastroenterology Progress Note     Author: Jericho Morris M.D.   Date & Time Created: 7/5/2020 12:41 PM    Chief Complaint:  Abdominal pain and pelvic abscess following laparoscopic cholecystectomy 6 weeks ago.    Interval History:  C/o pain mostly in the right lower abdomen and at the catheter insertion site. Tolerating diet.  Denies fever or chills.  Denies melena or hematochezia.    Review of Systems:  Review of Systems   Constitutional: Positive for malaise/fatigue. Negative for chills and fever.   Respiratory: Negative for cough, hemoptysis and shortness of breath.    Cardiovascular: Negative for chest pain and palpitations.   Gastrointestinal: Positive for abdominal pain. Negative for blood in stool, diarrhea, heartburn, melena, nausea and vomiting.   Genitourinary: Negative for dysuria.   Skin: Negative for rash.   Neurological: Negative for dizziness.   Endo/Heme/Allergies: Does not bruise/bleed easily.       Current Facility-Administered Medications:   •  atorvastatin (LIPITOR) tablet 20 mg, 20 mg, Oral, Nightly, Ty Munoz D.O., 20 mg at 07/03/20 2127  •  diazePAM (VALIUM) tablet 10 mg, 10 mg, Oral, Q6HRS PRN, TAM Lomax.O., 10 mg at 07/04/20 2328  •  finasteride (PROSCAR) tablet 5 mg, 5 mg, Oral, DAILY, TAM Lomax.O., 5 mg at 07/05/20 0549  •  lisinopril (PRINIVIL) tablet 40 mg, 40 mg, Oral, DAILY, STEPAN LomaxO., 40 mg at 07/05/20 0549  •  methocarbamol (ROBAXIN) tablet 750 mg, 750 mg, Oral, BID PRN, STEPAN LomaxO.  •  zolpidem (AMBIEN) tablet 2.5 mg, 2.5 mg, Oral, HS PRN - MR X 1, STEPAN LomaxO., 2.5 mg at 07/03/20 2300  •  senna-docusate (PERICOLACE or SENOKOT S) 8.6-50 MG per tablet 2 Tab, 2 Tab, Oral, BID, 2 Tab at 07/02/20 0616 **AND** polyethylene glycol/lytes (MIRALAX) PACKET 1 Packet, 1 Packet, Oral, QDAY PRN **AND** magnesium hydroxide (MILK OF MAGNESIA) suspension 30 mL, 30 mL, Oral, QDAY PRN **AND** bisacodyl (DULCOLAX) suppository 10 mg, 10 mg,  Rectal, QDAY PRN, Ty Munoz D.O.  •  lactated ringers infusion, , Intravenous, Continuous, Mary Kate Marsh D.O., Last Rate: 125 mL/hr at 07/03/20 0511  •  acetaminophen (TYLENOL) tablet 650 mg, 650 mg, Oral, Q6HRS PRN, Ty Munoz D.O., 650 mg at 07/04/20 1305  •  Notify provider if pain remains uncontrolled, , , CONTINUOUS **AND** Use the numeric rating scale (NRS-11) on regular floors and Critical-Care Pain Observation Tool (CPOT) on ICUs/Trauma to assess pain, , , CONTINUOUS **AND** Pulse Ox (Oximetry), , , CONTINUOUS **AND** Pharmacy Consult Request ...Pain Management Review 1 Each, 1 Each, Other, PHARMACY TO DOSE **AND** If patient difficult to arouse and/or has respiratory depression, stop any opiates that are currently infusing and call a Rapid Response., , , CONTINUOUS **AND** oxyCODONE immediate-release (ROXICODONE) tablet 5 mg, 5 mg, Oral, Q3HRS PRN **AND** oxyCODONE immediate-release (ROXICODONE) tablet 10 mg, 10 mg, Oral, Q3HRS PRN, 10 mg at 07/04/20 2245 **AND** HYDROmorphone pf (DILAUDID) injection 0.5 mg, 0.5 mg, Intravenous, Q3HRS PRN, STEPAN LomaxOSantino, 0.5 mg at 07/01/20 1345  •  enalaprilat (VASOTEC) injection 1.25 mg, 1.25 mg, Intravenous, Q6HRS PRN, Ty Munoz D.O.  •  ondansetron (ZOFRAN) syringe/vial injection 4 mg, 4 mg, Intravenous, Q4HRS PRN, Ty Munoz D.O.  •  ondansetron (ZOFRAN ODT) dispertab 4 mg, 4 mg, Oral, Q4HRS PRN, Ty Munoz D.O.  •  promethazine (PHENERGAN) tablet 12.5-25 mg, 12.5-25 mg, Oral, Q4HRS PRN, Ty Munoz D.O.  •  promethazine (PHENERGAN) suppository 12.5-25 mg, 12.5-25 mg, Rectal, Q4HRS PRN, Ty Munoz D.O.  •  prochlorperazine (COMPAZINE) injection 5-10 mg, 5-10 mg, Intravenous, Q4HRS PRN, Ty Munoz D.O.     Physical Exam:  Physical Exam   Constitutional: He is oriented to person, place, and time. He appears well-developed and well-nourished.   HENT:   Head: Atraumatic.   Eyes: No scleral icterus.   Neck: No  thyromegaly present.   Cardiovascular: Normal rate and regular rhythm.   Pulmonary/Chest: Effort normal and breath sounds normal. No respiratory distress.   Abdominal: Soft. Bowel sounds are normal. He exhibits no distension and no mass. There is no abdominal tenderness. There is no rebound and no guarding.   Musculoskeletal:         General: No edema.   Neurological: He is alert and oriented to person, place, and time.   Skin: Skin is warm and dry.   Psychiatric: He has a normal mood and affect.       Labs:          Recent Labs     07/03/20  0542 07/04/20 0239 07/05/20  0542   SODIUM 139 137 138   POTASSIUM 3.9 3.5* 3.5*   CHLORIDE 104 104 102   CO2 25 23 23   BUN 6* 5* 6*   CREATININE 0.50 0.42* 0.44*   CALCIUM 8.9 9.1 9.7     Recent Labs     07/03/20  0542 07/04/20 0239 07/05/20  0542   ALTSGPT 146* 152* 201*   ASTSGOT 131* 177* 300*   ALKPHOSPHAT 1034* 1198* 1725*   TBILIRUBIN 0.3 0.4 0.7   LIPASE 281* 233*  --    GLUCOSE 195* 185* 233*     Recent Labs     07/03/20  0542 07/04/20 0239 07/05/20  0542   RBC 3.43* 3.66* 4.26*   HEMOGLOBIN 9.4* 9.9* 11.5*   HEMATOCRIT 30.2* 31.9* 38.0*   PLATELETCT 258 276 326     Recent Labs     07/03/20  0542 07/04/20 0239 07/05/20  0542   WBC 5.1 5.4 6.4   NEUTSPOLYS  --  55.40 57.20   LYMPHOCYTES  --  33.70 34.60   MONOCYTES  --  7.70 5.60   EOSINOPHILS  --  2.20 1.70   BASOPHILS  --  0.40 0.60   ASTSGOT 131* 177* 300*   ALTSGPT 146* 152* 201*   ALKPHOSPHAT 1034* 1198* 1725*   TBILIRUBIN 0.3 0.4 0.7       Imaging:    CT abdomen reviewed. 7/03/2020     No free air is present.  The liver, spleen, pancreas, adrenal glands, and kidneys are normal in noncontrast CT appearance.  The gallbladder is absent.  There is no upper abdominal adenopathy.  The visualized bowel is without evidence of obstruction.  Sigmoid diverticulosis is present. There is no evidence of acute diverticulitis.     Imaging of the pelvis shows no pelvic adenopathy.  A right hemipelvic pigtail catheter is in  place. There is minimal residual abscess in the pelvis measuring approximately 4.2 x 2.7 x 2.7 cm. This is significantly smaller than the predrainage CT examination.  No free fluid is present.  No new pelvic fluid collection is present.  The bladder is grossly normal    7/5/20  HIDA scan  No bile leak.  Normal excretion into cbd and duodenum.    Assessment:  54-year-old male with 6 weeks history of upper and lower abdominal pain following laparoscopic cholecystectomy.  Subsequent imaging studies revealed pelvic abscess which has been drained and patient is currently on antibiotics.  His liver enzymes have been elevated in a predominantly cholestatic pattern with normal bilirubin.  Although outside MRCP revealed dilated bile duct no obstructive stone was identified.  Therefore, abnormal liver enzymes could be secondary to intrahepatic cholestasis due to intra-abdominal sepsis.  HIDA scan findings and normal bilirubin not suggestive of biliary obstruction.  Await MRI abdomen, if positive of stone will proceed with ERCP. If negative will consider EUS for further evaluation of biliary tree and proceed with EUS guided liver biopsy if negative. Disproportionate elevation of ALP with normal bilirubin is more suggestive of intrahepatic or canalicular cholestasis as could occur in sepsis or DILI. Will also fractionate ALP.    Problems:  1.  Cholestatic hepatitis  2.  Pelvic abscess  3.  Rule out bile leak  4.  Rule out biliary obstruction    Plan:  1.  MRI abdomen with contrast  2.  Monitor LFTs  3.  Fractionated ALP  4.  ERCP or EUS based on MRI findings.    Quality-Core Measures

## 2020-07-05 NOTE — CARE PLAN
Problem: Communication  Goal: The ability to communicate needs accurately and effectively will improve  Outcome: PROGRESSING AS EXPECTED  Intervention: [place] patient and significant other/support system to call light to alert staff of needs  Flowsheets (Taken 7/5/2020 0844)  Oriented to:: All of the Following : Location of Bathroom, Visiting Policy, Unit Routine, Call Light and Bedside Controls, Bedside Rail Policy, Smoking Policy, Rights and Responsibilities, Bedside Report, and Patient Education Notebook  Note: Patient oriented to surroundings and unit policies/routines.  Educated and understands the use of the call button.  Patient calls appropriately.      Problem: Safety  Goal: Will remain free from falls  Outcome: PROGRESSING AS EXPECTED  Intervention: Implement fall precautions  Flowsheets  Taken 7/5/2020 0849  Bed Alarm: Alarm Not On  IV Pole on Same Side of Bed as Bathroom: Yes  Bedrails: Bedrails Closest to Bathroom Down  Taken 7/5/2020 1904  Environmental Precautions:   Treaded Slipper Socks on Patient   Personal Belongings, Wastebasket, Call Bell etc. in Easy Reach   Report Given to Other Health Care Providers Regarding Fall Risk   Bed in Low Position   Communication Sign for Patients & Families   Mobility Assessed & Appropriate Sign Placed  Note: Patient educated and understands the fall precautions in place to prevent falls.  Bed alarm is off, patient calls appropriately, IV pole closest to bathroom, treaded slipper socks on, and bedrails closest to bathroom down.  Patient also educated and understands the use of the call button for any assistance with mobility.

## 2020-07-06 VITALS
SYSTOLIC BLOOD PRESSURE: 150 MMHG | OXYGEN SATURATION: 98 % | DIASTOLIC BLOOD PRESSURE: 70 MMHG | HEIGHT: 71 IN | WEIGHT: 143.3 LBS | TEMPERATURE: 97.4 F | RESPIRATION RATE: 18 BRPM | BODY MASS INDEX: 20.06 KG/M2 | HEART RATE: 67 BPM

## 2020-07-06 LAB
ALBUMIN SERPL BCP-MCNC: 3.2 G/DL (ref 3.2–4.9)
ALBUMIN/GLOB SERPL: 1.2 G/DL
ALP SERPL-CCNC: 1182 U/L (ref 30–99)
ALT SERPL-CCNC: 130 U/L (ref 2–50)
ANION GAP SERPL CALC-SCNC: 13 MMOL/L (ref 7–16)
AST SERPL-CCNC: 101 U/L (ref 12–45)
BILIRUB SERPL-MCNC: 0.5 MG/DL (ref 0.1–1.5)
BUN SERPL-MCNC: 9 MG/DL (ref 8–22)
CALCIUM SERPL-MCNC: 9.2 MG/DL (ref 8.4–10.2)
CHLORIDE SERPL-SCNC: 102 MMOL/L (ref 96–112)
CO2 SERPL-SCNC: 21 MMOL/L (ref 20–33)
CREAT SERPL-MCNC: 0.4 MG/DL (ref 0.5–1.4)
ERYTHROCYTE [DISTWIDTH] IN BLOOD BY AUTOMATED COUNT: 48 FL (ref 35.9–50)
GLOBULIN SER CALC-MCNC: 2.7 G/DL (ref 1.9–3.5)
GLUCOSE SERPL-MCNC: 223 MG/DL (ref 65–99)
HCT VFR BLD AUTO: 34.7 % (ref 42–52)
HGB BLD-MCNC: 10.5 G/DL (ref 14–18)
LIPASE SERPL-CCNC: 268 U/L (ref 7–58)
MCH RBC QN AUTO: 27.7 PG (ref 27–33)
MCHC RBC AUTO-ENTMCNC: 30.3 G/DL (ref 33.7–35.3)
MCV RBC AUTO: 91.6 FL (ref 81.4–97.8)
PLATELET # BLD AUTO: 169 K/UL (ref 164–446)
PMV BLD AUTO: 11.4 FL (ref 9–12.9)
POTASSIUM SERPL-SCNC: 4 MMOL/L (ref 3.6–5.5)
PROT SERPL-MCNC: 5.9 G/DL (ref 6–8.2)
RBC # BLD AUTO: 3.79 M/UL (ref 4.7–6.1)
SODIUM SERPL-SCNC: 136 MMOL/L (ref 135–145)
WBC # BLD AUTO: 5.2 K/UL (ref 4.8–10.8)

## 2020-07-06 PROCEDURE — 80053 COMPREHEN METABOLIC PANEL: CPT

## 2020-07-06 PROCEDURE — 83690 ASSAY OF LIPASE: CPT

## 2020-07-06 PROCEDURE — A9270 NON-COVERED ITEM OR SERVICE: HCPCS | Performed by: INTERNAL MEDICINE

## 2020-07-06 PROCEDURE — 85027 COMPLETE CBC AUTOMATED: CPT

## 2020-07-06 PROCEDURE — 700102 HCHG RX REV CODE 250 W/ 637 OVERRIDE(OP): Performed by: INTERNAL MEDICINE

## 2020-07-06 PROCEDURE — 99239 HOSP IP/OBS DSCHRG MGMT >30: CPT | Performed by: INTERNAL MEDICINE

## 2020-07-06 PROCEDURE — 36415 COLL VENOUS BLD VENIPUNCTURE: CPT

## 2020-07-06 RX ORDER — CIPROFLOXACIN 500 MG/1
500 TABLET, FILM COATED ORAL EVERY 12 HOURS
Status: DISCONTINUED | OUTPATIENT
Start: 2020-07-06 | End: 2020-07-06 | Stop reason: HOSPADM

## 2020-07-06 RX ADMIN — OXYCODONE HYDROCHLORIDE 10 MG: 10 TABLET ORAL at 05:41

## 2020-07-06 RX ADMIN — OXYCODONE HYDROCHLORIDE 10 MG: 10 TABLET ORAL at 08:33

## 2020-07-06 RX ADMIN — LISINOPRIL 40 MG: 20 TABLET ORAL at 05:37

## 2020-07-06 RX ADMIN — FINASTERIDE 5 MG: 5 TABLET, FILM COATED ORAL at 05:37

## 2020-07-06 RX ADMIN — OXYCODONE HYDROCHLORIDE 10 MG: 10 TABLET ORAL at 02:19

## 2020-07-06 ASSESSMENT — ENCOUNTER SYMPTOMS: ABDOMINAL PAIN: 1

## 2020-07-06 NOTE — PROGRESS NOTES
Trauma / Surgical Daily Progress Note    Date of Service  7/6/2020    Chief Complaint  54 y.o. male admitted 7/1/2020 with Acute pancreatitis    Interval Events  LFTs continue to vacillate. MRCP reviewed - dilated CBD with no stone ? Stricture ? Tumor. Decision on ERCP awaiting GI.  NO abscess on MRI abd. DC ROWDY. Switch to PO Cipro. Will sign off.     Review of Systems  Review of Systems   Gastrointestinal: Positive for abdominal pain.        Vital Signs for last 24 hours  Temp:  [36.4 °C (97.5 °F)-36.8 °C (98.2 °F)] 36.6 °C (97.9 °F)  Pulse:  [67-73] 72  Resp:  [17-19] 18  BP: (133-158)/(68-82) 134/68  SpO2:  [97 %-98 %] 97 %    Hemodynamic parameters for last 24 hours       Respiratory Data     Respiration: 18, Pulse Oximetry: 97 %             Physical Exam  Physical Exam  Eyes:      General: No scleral icterus.  Neck:      Musculoskeletal: Neck supple.   Cardiovascular:      Rate and Rhythm: Normal rate.   Pulmonary:      Effort: Pulmonary effort is normal.   Abdominal:      General: There is no distension.      Palpations: Abdomen is soft.      Tenderness: There is abdominal tenderness.      Comments: ROWDY serous ? bilious   Skin:     General: Skin is warm.   Neurological:      Mental Status: He is alert.         Laboratory  Recent Results (from the past 24 hour(s))   Comp Metabolic Panel    Collection Time: 07/06/20  4:40 AM   Result Value Ref Range    Sodium 136 135 - 145 mmol/L    Potassium 4.0 3.6 - 5.5 mmol/L    Chloride 102 96 - 112 mmol/L    Co2 21 20 - 33 mmol/L    Anion Gap 13.0 7.0 - 16.0    Glucose 223 (H) 65 - 99 mg/dL    Bun 9 8 - 22 mg/dL    Creatinine 0.40 (L) 0.50 - 1.40 mg/dL    Calcium 9.2 8.4 - 10.2 mg/dL    AST(SGOT) 101 (H) 12 - 45 U/L    ALT(SGPT) 130 (H) 2 - 50 U/L    Alkaline Phosphatase 1182 (H) 30 - 99 U/L    Total Bilirubin 0.5 0.1 - 1.5 mg/dL    Albumin 3.2 3.2 - 4.9 g/dL    Total Protein 5.9 (L) 6.0 - 8.2 g/dL    Globulin 2.7 1.9 - 3.5 g/dL    A-G Ratio 1.2 g/dL   CBC WITHOUT  DIFFERENTIAL    Collection Time: 07/06/20  4:40 AM   Result Value Ref Range    WBC 5.2 4.8 - 10.8 K/uL    RBC 3.79 (L) 4.70 - 6.10 M/uL    Hemoglobin 10.5 (L) 14.0 - 18.0 g/dL    Hematocrit 34.7 (L) 42.0 - 52.0 %    MCV 91.6 81.4 - 97.8 fL    MCH 27.7 27.0 - 33.0 pg    MCHC 30.3 (L) 33.7 - 35.3 g/dL    RDW 48.0 35.9 - 50.0 fL    Platelet Count 169 164 - 446 K/uL    MPV 11.4 9.0 - 12.9 fL   LIPASE    Collection Time: 07/06/20  4:40 AM   Result Value Ref Range    Lipase 268 (H) 7 - 58 U/L   ESTIMATED GFR    Collection Time: 07/06/20  4:40 AM   Result Value Ref Range    GFR If African American >60 >60 mL/min/1.73 m 2    GFR If Non African American >60 >60 mL/min/1.73 m 2       Fluids    Intake/Output Summary (Last 24 hours) at 7/6/2020 0922  Last data filed at 7/6/2020 0700  Gross per 24 hour   Intake 840 ml   Output --   Net 840 ml       Core Measures & Quality Metrics  Labs reviewed and Medications reviewed        DVT Prophylaxis: Enoxaparin (Lovenox)  DVT prophylaxis - mechanical: SCDs  Ulcer prophylaxis: Yes  Antibiotics: Treating active infection/contamination beyond 24 hours perioperative coverage  Assessed for rehab: Patient returned to prior level of function, rehabilitation not indicated at this time    ODILIA Score  ETOH Screening    Assessment/Plan  No new Assessment & Plan notes have been filed under this hospital service since the last note was generated.  Service: Surgery General      Discussed patient condition with RN and Patient.  CRITICAL CARE TIME EXCLUDING PROCEDURES: 20    minutes

## 2020-07-06 NOTE — CARE PLAN
Problem: Communication  Goal: The ability to communicate needs accurately and effectively will improve  7/6/2020 1335 by Jennifer Gilligan, R.N.  Outcome: DISCHARGED-GOAL NOT MET  7/6/2020 0751 by Jennifer Gilligan, R.N.  Outcome: PROGRESSING AS EXPECTED     Problem: Safety  Goal: Will remain free from injury  7/6/2020 1335 by Jennifer Gilligan, R.N.  Outcome: DISCHARGED-GOAL NOT MET  7/6/2020 0751 by Jennifer Gilligan, R.N.  Outcome: PROGRESSING AS EXPECTED  Goal: Will remain free from falls  7/6/2020 1335 by Jennifer Gilligan, R.N.  Outcome: DISCHARGED-GOAL NOT MET  7/6/2020 0751 by Jennifer Gilligan, R.N.  Outcome: PROGRESSING AS EXPECTED     Problem: Infection  Goal: Will remain free from infection  7/6/2020 1335 by Jennifer Gilligan, R.N.  Outcome: DISCHARGED-GOAL NOT MET  7/6/2020 0751 by Jennifer Gilligan, R.N.  Outcome: PROGRESSING AS EXPECTED     Problem: Venous Thromboembolism (VTW)/Deep Vein Thrombosis (DVT) Prevention:  Goal: Patient will participate in Venous Thrombosis (VTE)/Deep Vein Thrombosis (DVT)Prevention Measures  7/6/2020 1335 by Jennifer Gilligan, R.N.  Outcome: DISCHARGED-GOAL NOT MET  7/6/2020 0751 by Jennifer Gilligan, R.N.  Outcome: PROGRESSING AS EXPECTED  Intervention: Assess and monitor for anticoagulation complications  Note: Patient is refusing SCDs at this time as he is ambulating frequently, no s/s of DVT complication at this time.     Problem: Bowel/Gastric:  Goal: Normal bowel function is maintained or improved  7/6/2020 1335 by Jennifer Gilligan, R.N.  Outcome: DISCHARGED-GOAL NOT MET  7/6/2020 0751 by Jennifer Gilligan, R.N.  Outcome: PROGRESSING AS EXPECTED  Flowsheets  Taken 7/6/2020 0700 by Jennifer Gilligan, R.N.  Last BM: 07/04/20  Taken 7/4/2020 1900 by Carmelo Bailey R.N.  Number of Times Stooled: 1  Intervention: Educate patient and significant other/support system about diet, fluid intake, medications and activity to promote bowel function  Note: Patient's bowel  function is at baseline, educated regarding bowel function and to let us know if any issues arise.  Goal: Will not experience complications related to bowel motility  7/6/2020 1335 by Jennifer Gilligan, R.N.  Outcome: DISCHARGED-GOAL NOT MET  7/6/2020 0751 by Jennifer Gilligan, R.N.  Outcome: PROGRESSING AS EXPECTED     Problem: Knowledge Deficit  Goal: Knowledge of disease process/condition, treatment plan, diagnostic tests, and medications will improve  7/6/2020 1335 by Jennifer Gilligan, R.N.  Outcome: DISCHARGED-GOAL NOT MET  7/6/2020 0751 by Jennifer Gilligan, R.N.  Outcome: PROGRESSING AS EXPECTED  Goal: Knowledge of the prescribed therapeutic regimen will improve  7/6/2020 1335 by Jennifer Gilligan, R.N.  Outcome: DISCHARGED-GOAL NOT MET  7/6/2020 0751 by Jennifer Gilligan, R.N.  Outcome: PROGRESSING AS EXPECTED     Problem: Discharge Barriers/Planning  Goal: Patient's continuum of care needs will be met  7/6/2020 1335 by Jennifer Gilligan, R.N.  Outcome: DISCHARGED-GOAL NOT MET  7/6/2020 0751 by Jennifer Gilligan, R.N.  Outcome: PROGRESSING AS EXPECTED     Problem: Pain Management  Goal: Pain level will decrease to patient's comfort goal  7/6/2020 1335 by Jennifer Gilligan, R.N.  Outcome: DISCHARGED-GOAL NOT MET  7/6/2020 0751 by Jennifer Gilligan, R.N.  Outcome: PROGRESSING AS EXPECTED     Problem: Skin Integrity  Goal: Risk for impaired skin integrity will decrease  7/6/2020 1335 by Jennifer Gilligan, R.N.  Outcome: DISCHARGED-GOAL NOT MET  7/6/2020 0751 by Jennifer Gilligan, R.N.  Outcome: PROGRESSING AS EXPECTED

## 2020-07-06 NOTE — PROGRESS NOTES
Bedside report completed w/ Kesha/RN.  Assumed pt care. Patient in sitting at edge of bed, in no apparent distress.  Safety precautions in place. Call light & personal belongings within reach.  Plan of care discussed.  No needs expressed at this time.

## 2020-07-06 NOTE — PROGRESS NOTES
"Patient transferred to [facility] via bed.  IR drain flushed with 20ml sterile saline.  Minimal amount serous drainage in drain.  Dilaudid 0.5mg given for pain at \"8/10\" in abdomen.    "

## 2020-07-06 NOTE — DISCHARGE SUMMARY
Discharge Summary    CHIEF COMPLAINT ON ADMISSION  Chief Complaint   Patient presents with   • Bloody Stools   • Abdominal Pain       Reason for Admission  Acute pancreatitis     Admission Date  7/1/2020    CODE STATUS  Full Code    HPI & HOSPITAL COURSE  This is a 54 y.o. male here with from [facility] with 3 episodes of rectal bleeding and increasing abdominal pain.  He had elevated transaminases and lipase is post cholecystectomy in 5/2020 with Dr Concepcion.  He was transferred for GI evaluation for ERCP given lab abnormalities.  CT abdomen was done and CBD dilated and also a pelvic fluid collection found.  IR came to [facility] and placed ROWDY drain in the pelvis with relief of the fluid collection, no purulence noted and cultures showed enterobacter for which the antibiotics he was placed on treated adequately.   Patient seen by GI and General Surgery.  ROWDY drain has been removed and he underwent a HIDA scan and MRI with contrast and no evidence of biliary leak nor abscesses in liver or obstructive processes.  His transaminases have trended back down and GI is recommending an EUS to be scheduled as outpatient for further evaluation of the biliary system with possible biopsy.  Pain is markedly better and he is tolerating a regular diet and has return of normal appetite.    Therefore, he is discharged in good and stable condition to home with close outpatient follow-up.    The patient met 2-midnight criteria for an inpatient stay at the time of discharge.    Discharge Date  7/6/2020    FOLLOW UP ITEMS POST DISCHARGE  Pcp, GI for EUS    DISCHARGE DIAGNOSES  Active Problems:    Pancreatitis POA: Yes    Elevated liver enzymes POA: Yes    Chronic back pain POA: Yes    Normocytic anemia POA: Yes    Hyperglycemia POA: Yes    BPH (benign prostatic hyperplasia) POA: Yes    Dyslipidemia POA: Yes    Hypertension POA: Yes      Overview: Lisinopril-Dr. Mike Bloch    GI bleed POA: Yes    Pelvic abscess in male (HCC) POA: Unknown  Resolved  Problems:    * No resolved hospital problems. *      FOLLOW UP  No future appointments.  Rivas Owens M.D.  [address]  [facility] [address]  [phone]          Mahendra Yap M.D.  [address]  [facility] [address]  [phone]    In 1 week  clinic will call you to arrange Endoscopic ultrasound      MEDICATIONS ON DISCHARGE     Medication List      CONTINUE taking these medications      Instructions   atorvastatin 20 MG Tabs  Commonly known as:  LIPITOR   Take 20 mg by mouth every evening.  Dose:  20 mg     * bismuth subsalicylate 262 MG/15ML Susp  Commonly known as:  PEPTO-BISMOL   Take 30 mL by mouth every 6 hours as needed. Indications: Diarrhea, Heartburn, Nausea  Dose:  30 mL     * PEPTO-BISMOL PO   Take 2 Tabs by mouth as needed (For diarrhea, heartburn, and nausea).  Dose:  2 Tab     diazepam 10 MG tablet  Commonly known as:  VALIUM   Take 10 mg by mouth every 6 hours as needed for Sleep.  Dose:  10 mg     finasteride 5 MG Tabs  Commonly known as:  PROSCAR   Take 5 mg by mouth every evening.  Dose:  5 mg     lisinopril 10 MG Tabs  Commonly known as:  PRINIVIL   Take 10 mg by mouth every day.  Dose:  10 mg     methocarbamol 750 MG Tabs  Commonly known as:  ROBAXIN   Take 750 mg by mouth 2 times a day as needed. Indications: Musculoskeletal Pain  Dose:  750 mg     oxyCODONE-acetaminophen  MG Tabs  Commonly known as:  PERCOCET-10   Take 0.5-1 Tabs by mouth every 6 hours as needed for Severe Pain.  Dose:  0.5-1 Tab     therapeutic multivitamin-minerals Tabs   Take 1 Tab by mouth every day.  Dose:  1 Tab     zolpidem 5 MG Tabs  Commonly known as:  AMBIEN   Take 2.5 mg by mouth at bedtime as needed for Sleep.  Dose:  2.5 mg         * This list has 2 medication(s) that are the same as other medications prescribed for you. Read the directions carefully, and ask your doctor or other care provider to review them with you.                Allergies  Allergies   Allergen Reactions   • Pcn [Penicillins]       Pt reports that its a childhood allergie, not sure what happens        DIET  Orders Placed This Encounter   Procedures   • Diet Order Regular     Standing Status:   Standing     Number of Occurrences:   1     Order Specific Question:   Diet:     Answer:   Regular [1]       ACTIVITY  As tolerated.  Weight bearing as tolerated    CONSULTATIONS  GI - Mahendra aYp  Surgery - Livier Dwod    PROCEDURES  IR Drain placement - 7/1/2020 - Dr Leach    LABORATORY  Lab Results   Component Value Date    SODIUM 136 07/06/2020    POTASSIUM 4.0 07/06/2020    CHLORIDE 102 07/06/2020    CO2 21 07/06/2020    GLUCOSE 223 (H) 07/06/2020    BUN 9 07/06/2020    CREATININE 0.40 (L) 07/06/2020        Lab Results   Component Value Date    WBC 5.2 07/06/2020    HEMOGLOBIN 10.5 (L) 07/06/2020    HEMATOCRIT 34.7 (L) 07/06/2020    PLATELETCT 169 07/06/2020        Total time of the discharge process exceeds 35 minutes.

## 2020-07-06 NOTE — CARE PLAN
Problem: Communication  Goal: The ability to communicate needs accurately and effectively will improve  Outcome: PROGRESSING AS EXPECTED  Note: Patient encouraged to voice feeling and concerns.     Problem: Pain Management  Goal: Pain level will decrease to patient's comfort goal  Outcome: PROGRESSING AS EXPECTED  Note: Patient required dose of Dilaudid for pain control.  Will monitor.

## 2020-07-06 NOTE — CARE PLAN
Problem: Communication  Goal: The ability to communicate needs accurately and effectively will improve  Outcome: PROGRESSING AS EXPECTED     Problem: Safety  Goal: Will remain free from injury  Outcome: PROGRESSING AS EXPECTED  Goal: Will remain free from falls  Outcome: PROGRESSING AS EXPECTED     Problem: Infection  Goal: Will remain free from infection  Outcome: PROGRESSING AS EXPECTED     Problem: Venous Thromboembolism (VTW)/Deep Vein Thrombosis (DVT) Prevention:  Goal: Patient will participate in Venous Thrombosis (VTE)/Deep Vein Thrombosis (DVT)Prevention Measures  Outcome: PROGRESSING AS EXPECTED  Intervention: Assess and monitor for anticoagulation complications  Note: Patient is refusing SCDs at this time as he is ambulating frequently, no s/s of DVT complication at this time.     Problem: Bowel/Gastric:  Goal: Normal bowel function is maintained or improved  Outcome: PROGRESSING AS EXPECTED  Flowsheets  Taken 7/6/2020 0700 by Jennifer Gilligan, RSantinoN.  Last BM: 07/04/20  Taken 7/4/2020 1900 by Carmelo Bailey RSantinoNSantino  Number of Times Stooled: 1  Intervention: Educate patient and significant other/support system about diet, fluid intake, medications and activity to promote bowel function  Note: Patient's bowel function is at baseline, educated regarding bowel function and to let us know if any issues arise.  Goal: Will not experience complications related to bowel motility  Outcome: PROGRESSING AS EXPECTED     Problem: Knowledge Deficit  Goal: Knowledge of disease process/condition, treatment plan, diagnostic tests, and medications will improve  Outcome: PROGRESSING AS EXPECTED  Goal: Knowledge of the prescribed therapeutic regimen will improve  Outcome: PROGRESSING AS EXPECTED     Problem: Discharge Barriers/Planning  Goal: Patient's continuum of care needs will be met  Outcome: PROGRESSING AS EXPECTED     Problem: Pain Management  Goal: Pain level will decrease to patient's comfort goal  Outcome:  PROGRESSING AS EXPECTED     Problem: Skin Integrity  Goal: Risk for impaired skin integrity will decrease  Outcome: PROGRESSING AS EXPECTED

## 2020-07-06 NOTE — DISCHARGE INSTRUCTIONS
Discharge Instructions    Discharged to home by car with friend. Discharged via walking, hospital escort: Yes.  Special equipment needed: Not Applicable    Be sure to schedule a follow-up appointment with your primary care doctor or any specialists as instructed.     Discharge Plan:   Diet Plan: Discussed  Activity Level: Discussed  Smoking Cessation Offered: Patient Refused  Confirmed Follow up Appointment: Patient to Call and Schedule Appointment  Confirmed Symptoms Management: Discussed  Medication Reconciliation Updated: Yes    I understand that a diet low in cholesterol, fat, and sodium is recommended for good health. Unless I have been given specific instructions below for another diet, I accept this instruction as my diet prescription.   Other diet: Regular    Special Instructions: None    · Is patient discharged on Warfarin / Coumadin?   No     Depression / Suicide Risk    As [facility] are [facility], it is important to learn how to keep safe from harming yourself.    Recognize the warning signs:  · Abrupt changes in personality, positive or negative- including increase in energy   · Giving away possessions  · Change in eating patterns- significant weight changes-  positive or negative  · Change in sleeping patterns- unable to sleep or sleeping all the time   · Unwillingness or inability to communicate  · Depression  · Unusual sadness, discouragement and loneliness  · Talk of wanting to die  · Neglect of personal appearance   · Rebelliousness- reckless behavior  · Withdrawal from people/activities they love  · Confusion- inability to concentrate     If you or a loved one observes any of these behaviors or has concerns about self-harm, here's what you can do:  · Talk about it- your feelings and reasons for harming yourself  · Remove any means that you might use to hurt yourself (examples: pills, rope, extension cords, firearm)  · Get professional help from the community (Mental  Health, Substance Abuse, psychological counseling)  · Do not be alone:Call your Safe Contact- someone whom you trust who will be there for you.  · Call your local CRISIS HOTLINE 832-1185 or 986-788-3400  · Call your local Children's Mobile Crisis Response Team [place] (089) 363-5209 or www.ExoYou  · Call the toll free National Suicide Prevention Hotlines   · National Suicide Prevention Lifeline 150-122-ASVO (4222)  · National Hope Line Network 800-SUICIDE (557-7684)

## 2020-07-06 NOTE — PROGRESS NOTES
Assumed care of patient at 1900.  Patient is alert and oriented, sitting up in bed filling out his breakfast menu.  PIV remains saline locked.  Denies needs at this time.

## 2020-07-07 NOTE — PROGRESS NOTES
DATE OF SERVICE:  07/06/2020    CHIEF COMPLAINT:  None.  The patient is anxious to get home.    HISTORY OF PRESENT ILLNESS:  The patient was admitted to the hospital about 6   weeks after elective cholecystectomy.  The cholecystectomy was done purely   preventatively.  He said he did not have any complaints; however, he had   mentioned to his primary care physician, that he had lost on an involuntary   basis about 11 pounds.  Although he denied any type of abdominal discomfort   whatsoever and any type of symptom in the GI area, he did order an ultrasound   because he felt that the weight loss was unexplained.  This ultrasound ordered   by the primary care physician then revealed a gallstone.  He was then   referred to Dr. Concepcion, surgeon.  Discussions were had regarding removal of a   gallstone in case it ever got stuck.  There was no evidence of biliary   dyskinesia.  No evidence of pain, colicky pain, right upper quadrant pain,   pain to the shoulder blades, or pain to the back or the interscapular region,   whatsoever.  The procedure itself was uneventful, but he did not quite   recover.  He was first seen by the advanced nurse practitioner who saw him   then back 2 weeks later when he still complained of malaise and being actually   worse or actually than before the surgery or first started to develop   abdominal symptoms whereas previously he had not had any.  The second time, he   was seen 2 weeks after the first postop visit, labs were drawn.  They   revealed abnormal liver function tests, especially the alkaline phosphatase.    The patient was then evaluated by MRI, which did not reveal bile duct.    Subsequently, the patient was referred here for possible ERCP.  He was seen by   Dr. Morris.  The repeat MRCP was done yesterday revealing a dilated intra   and extrahepatic bile duct, but no evidence for gallstones.  No evidence for   biliary obstruction.  No evidence for retained stone or filling  defect.    The patient still denies currently any abdominal pain or discomfort.  He has   been treated for the pelvic abscesses in weeks past.  The patient currently   does not have a white count.  He does not have a fever.  His hemoglobin is   10.5, down from a max of 11.5.  It has been oscillating around this from 9.9   to 11.5 to 10.5.  Platelet count is normal.  The current labs reveal an   alkaline phosphatase of 1182 and an AST and ALT of 101 and 130.  The highest   level was yesterday with a level of AST and ALT at 300 and 201 and 1724 for   the alkaline phosphatase respectively.  Bilirubin has been normal all along.    There was a slight elevated lipase level of 268 today.    MRI of the abdomen reveals findings of absence of hepatic abscess prior to   cholecystectomy, moderate intra and extrahepatic biliary dilation without   presence of common bile duct stone.    PHYSICAL EXAMINATION:  GENERAL:  Nontoxic, nondiaphoretic, pleasant, not jaundiced.  HEENT:  Sclerae are anicteric.  Oropharynx without lesions.  NECK:  Supple, no asymmetries.  CHEST:  Unlabored.  Speaks in full sentences.  CARDIOVASCULAR:  RRR.  ABDOMEN:  Flat, soft, not distended and not tender, no rebound.  EXTREMITIES:  No CCE.  NEUROPSYCH:  Appropriate to exam and interview.    IMPRESSION:  1.  Abnormal liver function test mostly alkaline phosphatase, but it has   improved.  2.  Rapid rise and fall pattern of AST and ALT.  3.  The patient with recent cholecystectomy for asymptomatic cholelithiasis,   subsequently had pelvic abscesses.  4.  Residual dilated bile duct, intra and extra bile duct dilation, mild to   moderate.  5.  Significant elevated alkaline phosphatase with the highest level at 1700,   now decreasing to 1100.    RECOMMENDATIONS:  The patient wants to go home and I agree with that.  I also   have discussed that with the attending physician, Dr. Marsh.  We will make   arrangements for outpatient followup for careful evaluation  and scanning of   the extra and intrahepatic bile duct for retained filling defects as well as   the head of the pancreas for any type of budding lesion or mass.  At the same   time, a biopsy can be done via endoscopic ultrasound of the liver to evaluate   for drug-induced issues in case there is no evidence for mass or filling   defect.  At this point, I would not institute any particular treatment, but   just continue to monitor him and hopefully we get him in relatively soon.    The fact of a rapid rise and fall pattern of liver function test, alkaline   phosphatase as well as his transaminitis as well as a mild lipase bring to   mind, the possibility of a retained stone or sludge that may after all have   left the bile duct.  At this point; however, there is no way to prove this and   2 MRIs consecutively in short timeframe have not demonstrated any type of   filling defect.  In any event, the endoscopic ultrasound is the gold standard   to evaluate the common bile duct as well as the head of the pancreas and at   the same time, an endoscopic ultrasound-guided liver biopsy can be done.    We thank you for the ability to assist in the care of your patient and the   patient will be followed up as an outpatient and we will sign off.       ____________________________________     Jase Andersen MD EMD / CARLOS    DD:  07/06/2020 13:23:22  DT:  07/06/2020 17:20:01    D#:  [id number]  Job#:  [id number]

## 2020-07-09 LAB
ALP BONE SERPL-CCNC: 212 U/L (ref 0–55)
ALP ISOS SERPL HS-CCNC: 0 U/L
ALP LIVER SERPL-CCNC: 1417 U/L (ref 0–94)
ALP SERPL-CCNC: 1629 U/L (ref 40–120)

## 2020-07-15 NOTE — DOCUMENTATION QUERY
[facility]                                                                       Query Response Note      PATIENT:               SERGIO GONZALES  ACCT #:                  [id number]  MRN:                     [id number]  :                      1965  ADMIT DATE:       2020 2:22 AM  DISCH DATE:        2020 4:22 PM  RESPONDING  PROVIDER #:        [id number]           QUERY TEXT:    Please clarify in documentation the relationship, if any, between the pelvic abscess and prior cholecystectomy    The patient's Clinical Indicators include:  Patient is 5 weeks s/p elective cholecystectomy, transferred from OSH for pancreatitis and pelvic abscess. IR placed drain and fluid was culture positive for enterobacter sensitive to Rocephin. Patient was treated with IV antibiotics and discharged on po antibitoics.     PN Dr Dunaway - MRI of the abdomen reveals findings of absence of hepatic abscess prior to cholecystectomy, moderate intra and extrahepatic biliary dilation without presence of common bile duct stone.  Options provided:   -- Pelvic abscess is likely due to or associated with cholecystectomy   -- Unrelated to each other   -- Unable to determine      Query created by: Ranjana Unger on 7/15/2020 6:58 AM    RESPONSE TEXT:    Pelvic abscess is likely due to or associated with cholecystectomy          Electronically signed by:  SYLVAIN LOZADA DO 7/15/2020 2:59 PM

## 2020-07-29 RX ORDER — TIZANIDINE 4 MG/1
TABLET ORAL
COMMUNITY
Start: 2020-01-30 | End: 2020-08-04

## 2020-07-29 RX ORDER — VARENICLINE TARTRATE 1 MG/1
TABLET, FILM COATED ORAL
COMMUNITY
Start: 2020-02-11 | End: 2022-03-02

## 2020-08-04 ENCOUNTER — APPOINTMENT (OUTPATIENT)
Dept: ADMISSIONS | Facility: MEDICAL CENTER | Age: 55
End: 2020-08-04
Attending: INTERNAL MEDICINE
Payer: MEDICAID

## 2020-08-04 DIAGNOSIS — Z01.812 PRE-OPERATIVE LABORATORY EXAMINATION: ICD-10-CM

## 2020-08-04 DIAGNOSIS — Z01.810 PRE-OPERATIVE CARDIOVASCULAR EXAMINATION: ICD-10-CM

## 2020-08-04 LAB
ANION GAP SERPL CALC-SCNC: 14 MMOL/L (ref 7–16)
BASOPHILS # BLD AUTO: 0.6 % (ref 0–1.8)
BASOPHILS # BLD: 0.04 K/UL (ref 0–0.12)
BUN SERPL-MCNC: 13 MG/DL (ref 8–22)
CALCIUM SERPL-MCNC: 10.4 MG/DL (ref 8.4–10.2)
CHLORIDE SERPL-SCNC: 95 MMOL/L (ref 96–112)
CO2 SERPL-SCNC: 25 MMOL/L (ref 20–33)
COVID ORDER STATUS COVID19: NORMAL
CREAT SERPL-MCNC: 0.72 MG/DL (ref 0.5–1.4)
EKG IMPRESSION: NORMAL
EOSINOPHIL # BLD AUTO: 0.06 K/UL (ref 0–0.51)
EOSINOPHIL NFR BLD: 0.8 % (ref 0–6.9)
ERYTHROCYTE [DISTWIDTH] IN BLOOD BY AUTOMATED COUNT: 45.1 FL (ref 35.9–50)
GLUCOSE SERPL-MCNC: 293 MG/DL (ref 65–99)
HCT VFR BLD AUTO: 47 % (ref 42–52)
HGB BLD-MCNC: 15.5 G/DL (ref 14–18)
IMM GRANULOCYTES # BLD AUTO: 0.04 K/UL (ref 0–0.11)
IMM GRANULOCYTES NFR BLD AUTO: 0.6 % (ref 0–0.9)
LYMPHOCYTES # BLD AUTO: 2.67 K/UL (ref 1–4.8)
LYMPHOCYTES NFR BLD: 36.8 % (ref 22–41)
MCH RBC QN AUTO: 27.9 PG (ref 27–33)
MCHC RBC AUTO-ENTMCNC: 33 G/DL (ref 33.7–35.3)
MCV RBC AUTO: 84.7 FL (ref 81.4–97.8)
MONOCYTES # BLD AUTO: 0.43 K/UL (ref 0–0.85)
MONOCYTES NFR BLD AUTO: 5.9 % (ref 0–13.4)
NEUTROPHILS # BLD AUTO: 4.01 K/UL (ref 1.82–7.42)
NEUTROPHILS NFR BLD: 55.3 % (ref 44–72)
NRBC # BLD AUTO: 0 K/UL
NRBC BLD-RTO: 0 /100 WBC
PLATELET # BLD AUTO: 228 K/UL (ref 164–446)
PMV BLD AUTO: 10.3 FL (ref 9–12.9)
POTASSIUM SERPL-SCNC: 4.7 MMOL/L (ref 3.6–5.5)
RBC # BLD AUTO: 5.55 M/UL (ref 4.7–6.1)
SODIUM SERPL-SCNC: 134 MMOL/L (ref 135–145)
WBC # BLD AUTO: 7.3 K/UL (ref 4.8–10.8)

## 2020-08-04 PROCEDURE — U0003 INFECTIOUS AGENT DETECTION BY NUCLEIC ACID (DNA OR RNA); SEVERE ACUTE RESPIRATORY SYNDROME CORONAVIRUS 2 (SARS-COV-2) (CORONAVIRUS DISEASE [COVID-19]), AMPLIFIED PROBE TECHNIQUE, MAKING USE OF HIGH THROUGHPUT TECHNOLOGIES AS DESCRIBED BY CMS-2020-01-R: HCPCS

## 2020-08-04 PROCEDURE — 93005 ELECTROCARDIOGRAM TRACING: CPT

## 2020-08-04 PROCEDURE — 85025 COMPLETE CBC W/AUTO DIFF WBC: CPT

## 2020-08-04 PROCEDURE — 36415 COLL VENOUS BLD VENIPUNCTURE: CPT

## 2020-08-04 PROCEDURE — 93010 ELECTROCARDIOGRAM REPORT: CPT | Performed by: INTERNAL MEDICINE

## 2020-08-04 PROCEDURE — 80048 BASIC METABOLIC PNL TOTAL CA: CPT

## 2020-08-04 RX ORDER — LISINOPRIL AND HYDROCHLOROTHIAZIDE 12.5; 1 MG/1; MG/1
TABLET ORAL
COMMUNITY
Start: 2020-07-14 | End: 2022-06-27

## 2020-08-04 ASSESSMENT — FIBROSIS 4 INDEX: FIB4 SCORE: 2.94

## 2020-08-04 NOTE — OR NURSING
"Preadmit appointment: \" Preparing for your Procedure information\" sheet given to patient with verbal and written instructions. Patient instructed to continue prescribed medications through the day before surgery, instructed to take the following medications the day of surgery per anesthesia protocol: oxycodone if needed, statin.  "

## 2020-08-06 LAB
SARS-COV-2 RNA RESP QL NAA+PROBE: NOTDETECTED
SPECIMEN SOURCE: NORMAL

## 2020-08-07 ENCOUNTER — APPOINTMENT (OUTPATIENT)
Dept: RADIOLOGY | Facility: MEDICAL CENTER | Age: 55
End: 2020-08-07
Attending: INTERNAL MEDICINE
Payer: MEDICAID

## 2020-08-07 ENCOUNTER — ANESTHESIA (OUTPATIENT)
Dept: SURGERY | Facility: MEDICAL CENTER | Age: 55
End: 2020-08-07
Payer: MEDICAID

## 2020-08-07 ENCOUNTER — ANESTHESIA EVENT (OUTPATIENT)
Dept: SURGERY | Facility: MEDICAL CENTER | Age: 55
End: 2020-08-07
Payer: MEDICAID

## 2020-08-07 ENCOUNTER — HOSPITAL ENCOUNTER (OUTPATIENT)
Facility: MEDICAL CENTER | Age: 55
End: 2020-08-07
Attending: INTERNAL MEDICINE | Admitting: INTERNAL MEDICINE
Payer: MEDICAID

## 2020-08-07 VITALS
HEART RATE: 71 BPM | SYSTOLIC BLOOD PRESSURE: 147 MMHG | RESPIRATION RATE: 16 BRPM | WEIGHT: 146.16 LBS | TEMPERATURE: 97.2 F | DIASTOLIC BLOOD PRESSURE: 71 MMHG | OXYGEN SATURATION: 95 % | HEIGHT: 71 IN | BODY MASS INDEX: 20.46 KG/M2

## 2020-08-07 LAB — PATHOLOGY CONSULT NOTE: NORMAL

## 2020-08-07 PROCEDURE — 700111 HCHG RX REV CODE 636 W/ 250 OVERRIDE (IP): Performed by: ANESTHESIOLOGY

## 2020-08-07 PROCEDURE — 160025 RECOVERY II MINUTES (STATS): Performed by: INTERNAL MEDICINE

## 2020-08-07 PROCEDURE — A9270 NON-COVERED ITEM OR SERVICE: HCPCS | Performed by: INTERNAL MEDICINE

## 2020-08-07 PROCEDURE — 160036 HCHG PACU - EA ADDL 30 MINS PHASE I: Performed by: INTERNAL MEDICINE

## 2020-08-07 PROCEDURE — 160046 HCHG PACU - 1ST 60 MINS PHASE II: Performed by: INTERNAL MEDICINE

## 2020-08-07 PROCEDURE — 502240 HCHG MISC OR SUPPLY RC 0272: Performed by: INTERNAL MEDICINE

## 2020-08-07 PROCEDURE — 500066 HCHG BITE BLOCK, ECT: Performed by: INTERNAL MEDICINE

## 2020-08-07 PROCEDURE — A9270 NON-COVERED ITEM OR SERVICE: HCPCS | Performed by: ANESTHESIOLOGY

## 2020-08-07 PROCEDURE — 700105 HCHG RX REV CODE 258: Performed by: INTERNAL MEDICINE

## 2020-08-07 PROCEDURE — 110371 HCHG SHELL REV 272: Performed by: INTERNAL MEDICINE

## 2020-08-07 PROCEDURE — 88305 TISSUE EXAM BY PATHOLOGIST: CPT

## 2020-08-07 PROCEDURE — 88312 SPECIAL STAINS GROUP 1: CPT

## 2020-08-07 PROCEDURE — 160002 HCHG RECOVERY MINUTES (STAT): Performed by: INTERNAL MEDICINE

## 2020-08-07 PROCEDURE — 160203 HCHG ENDO MINUTES - 1ST 30 MINS LEVEL 4: Performed by: INTERNAL MEDICINE

## 2020-08-07 PROCEDURE — 160048 HCHG OR STATISTICAL LEVEL 1-5: Performed by: INTERNAL MEDICINE

## 2020-08-07 PROCEDURE — 74328 X-RAY BILE DUCT ENDOSCOPY: CPT

## 2020-08-07 PROCEDURE — 160208 HCHG ENDO MINUTES - EA ADDL 1 MIN LEVEL 4: Performed by: INTERNAL MEDICINE

## 2020-08-07 PROCEDURE — 700102 HCHG RX REV CODE 250 W/ 637 OVERRIDE(OP): Performed by: INTERNAL MEDICINE

## 2020-08-07 PROCEDURE — 160009 HCHG ANES TIME/MIN: Performed by: INTERNAL MEDICINE

## 2020-08-07 PROCEDURE — 700101 HCHG RX REV CODE 250: Performed by: ANESTHESIOLOGY

## 2020-08-07 PROCEDURE — 700102 HCHG RX REV CODE 250 W/ 637 OVERRIDE(OP): Performed by: ANESTHESIOLOGY

## 2020-08-07 PROCEDURE — 700101 HCHG RX REV CODE 250: Performed by: INTERNAL MEDICINE

## 2020-08-07 PROCEDURE — 160035 HCHG PACU - 1ST 60 MINS PHASE I: Performed by: INTERNAL MEDICINE

## 2020-08-07 PROCEDURE — 700105 HCHG RX REV CODE 258: Performed by: ANESTHESIOLOGY

## 2020-08-07 RX ORDER — OXYCODONE HCL 5 MG/5 ML
10 SOLUTION, ORAL ORAL
Status: COMPLETED | OUTPATIENT
Start: 2020-08-07 | End: 2020-08-07

## 2020-08-07 RX ORDER — SODIUM CHLORIDE, SODIUM LACTATE, POTASSIUM CHLORIDE, CALCIUM CHLORIDE 600; 310; 30; 20 MG/100ML; MG/100ML; MG/100ML; MG/100ML
INJECTION, SOLUTION INTRAVENOUS CONTINUOUS
Status: DISCONTINUED | OUTPATIENT
Start: 2020-08-07 | End: 2020-08-07 | Stop reason: HOSPADM

## 2020-08-07 RX ORDER — ROCURONIUM BROMIDE 10 MG/ML
INJECTION, SOLUTION INTRAVENOUS PRN
Status: DISCONTINUED | OUTPATIENT
Start: 2020-08-07 | End: 2020-08-07 | Stop reason: SURG

## 2020-08-07 RX ORDER — DIPHENHYDRAMINE HYDROCHLORIDE 50 MG/ML
12.5 INJECTION INTRAMUSCULAR; INTRAVENOUS
Status: DISCONTINUED | OUTPATIENT
Start: 2020-08-07 | End: 2020-08-07 | Stop reason: HOSPADM

## 2020-08-07 RX ORDER — LIDOCAINE HYDROCHLORIDE 10 MG/ML
INJECTION, SOLUTION INFILTRATION; PERINEURAL
Status: DISCONTINUED
Start: 2020-08-07 | End: 2020-08-07 | Stop reason: HOSPADM

## 2020-08-07 RX ORDER — LABETALOL HYDROCHLORIDE 5 MG/ML
5 INJECTION, SOLUTION INTRAVENOUS
Status: DISCONTINUED | OUTPATIENT
Start: 2020-08-07 | End: 2020-08-07 | Stop reason: HOSPADM

## 2020-08-07 RX ORDER — OXYCODONE HCL 5 MG/5 ML
5 SOLUTION, ORAL ORAL
Status: COMPLETED | OUTPATIENT
Start: 2020-08-07 | End: 2020-08-07

## 2020-08-07 RX ORDER — ONDANSETRON 2 MG/ML
4 INJECTION INTRAMUSCULAR; INTRAVENOUS
Status: DISCONTINUED | OUTPATIENT
Start: 2020-08-07 | End: 2020-08-07 | Stop reason: HOSPADM

## 2020-08-07 RX ORDER — HALOPERIDOL 5 MG/ML
1 INJECTION INTRAMUSCULAR
Status: DISCONTINUED | OUTPATIENT
Start: 2020-08-07 | End: 2020-08-07 | Stop reason: HOSPADM

## 2020-08-07 RX ORDER — SUCCINYLCHOLINE/SOD CL,ISO/PF 200MG/10ML
SYRINGE (ML) INTRAVENOUS PRN
Status: DISCONTINUED | OUTPATIENT
Start: 2020-08-07 | End: 2020-08-07 | Stop reason: SURG

## 2020-08-07 RX ORDER — MEPERIDINE HYDROCHLORIDE 25 MG/ML
6.25 INJECTION INTRAMUSCULAR; INTRAVENOUS; SUBCUTANEOUS
Status: DISCONTINUED | OUTPATIENT
Start: 2020-08-07 | End: 2020-08-07 | Stop reason: HOSPADM

## 2020-08-07 RX ORDER — LIDOCAINE HYDROCHLORIDE 20 MG/ML
INJECTION, SOLUTION EPIDURAL; INFILTRATION; INTRACAUDAL; PERINEURAL PRN
Status: DISCONTINUED | OUTPATIENT
Start: 2020-08-07 | End: 2020-08-07 | Stop reason: SURG

## 2020-08-07 RX ORDER — ONDANSETRON 2 MG/ML
INJECTION INTRAMUSCULAR; INTRAVENOUS PRN
Status: DISCONTINUED | OUTPATIENT
Start: 2020-08-07 | End: 2020-08-07 | Stop reason: SURG

## 2020-08-07 RX ORDER — HYDRALAZINE HYDROCHLORIDE 20 MG/ML
5 INJECTION INTRAMUSCULAR; INTRAVENOUS
Status: DISCONTINUED | OUTPATIENT
Start: 2020-08-07 | End: 2020-08-07 | Stop reason: HOSPADM

## 2020-08-07 RX ADMIN — SODIUM CHLORIDE, POTASSIUM CHLORIDE, SODIUM LACTATE AND CALCIUM CHLORIDE: 600; 310; 30; 20 INJECTION, SOLUTION INTRAVENOUS at 10:15

## 2020-08-07 RX ADMIN — FENTANYL CITRATE 50 MCG: 50 INJECTION, SOLUTION INTRAMUSCULAR; INTRAVENOUS at 09:15

## 2020-08-07 RX ADMIN — FENTANYL CITRATE 25 MCG: 50 INJECTION, SOLUTION INTRAMUSCULAR; INTRAVENOUS at 10:02

## 2020-08-07 RX ADMIN — Medication 80 MG: at 08:59

## 2020-08-07 RX ADMIN — PROPOFOL 200 MG: 10 INJECTION, EMULSION INTRAVENOUS at 08:59

## 2020-08-07 RX ADMIN — FENTANYL CITRATE 25 MCG: 50 INJECTION, SOLUTION INTRAMUSCULAR; INTRAVENOUS at 09:19

## 2020-08-07 RX ADMIN — LIDOCAINE HYDROCHLORIDE 0.5 ML: 10 INJECTION, SOLUTION INFILTRATION; PERINEURAL at 08:47

## 2020-08-07 RX ADMIN — FENTANYL CITRATE 25 MCG: 50 INJECTION, SOLUTION INTRAMUSCULAR; INTRAVENOUS at 10:34

## 2020-08-07 RX ADMIN — ROCURONIUM BROMIDE 5 MG: 10 INJECTION, SOLUTION INTRAVENOUS at 08:59

## 2020-08-07 RX ADMIN — POVIDONE-IODINE 15 ML: 10 SOLUTION TOPICAL at 08:39

## 2020-08-07 RX ADMIN — LIDOCAINE HYDROCHLORIDE 100 MG: 20 INJECTION, SOLUTION EPIDURAL; INFILTRATION; INTRACAUDAL; PERINEURAL at 08:59

## 2020-08-07 RX ADMIN — FENTANYL CITRATE 25 MCG: 50 INJECTION, SOLUTION INTRAMUSCULAR; INTRAVENOUS at 09:05

## 2020-08-07 RX ADMIN — ONDANSETRON 4 MG: 2 INJECTION INTRAMUSCULAR; INTRAVENOUS at 09:05

## 2020-08-07 RX ADMIN — OXYCODONE HYDROCHLORIDE 5 MG: 5 SOLUTION ORAL at 10:02

## 2020-08-07 RX ADMIN — SODIUM CHLORIDE, POTASSIUM CHLORIDE, SODIUM LACTATE AND CALCIUM CHLORIDE: 600; 310; 30; 20 INJECTION, SOLUTION INTRAVENOUS at 08:48

## 2020-08-07 ASSESSMENT — FIBROSIS 4 INDEX: FIB4 SCORE: 2.18

## 2020-08-07 ASSESSMENT — PAIN SCALES - GENERAL: PAIN_LEVEL: 0

## 2020-08-07 NOTE — OR NURSING
0927: To PACU from [facility] via tosin, sleeping, respirations spontaneous and non-labored via OPA. Supplemental oxygen via mask at 6 lpm.     0932: OPA DC'd, supplemental oxygen continued at 6 lpm via mask. Patient denies pain or nausea at this time.    0936: Dr. Abdul at bedside. Per Dr. Abdul, give one additional bolus of LR prior to discharge. Patient resting comfortably.    0940: Patient is resting and complaint free at this time. Patient transitioned to room air.    0945: Wife updated by telephone. Meets criteria to transfer to Stage 2.    0950: Report called to phase II ADOLFO Rich.     0955: Patient requesting pain meds for 5/10 left side abdominal pain.     1030: Assumed care from ADOLFO Lopez    1033: Patient is still having left side abdominal pain rated at 4/10, Plan to medicate per MAR as patient is not at a tolerable level at present.     1045: Patient rating pain at 3/10 and tolerable. Meets criteria to transfer to Stage 2

## 2020-08-07 NOTE — ANESTHESIA PROCEDURE NOTES
Airway    Date/Time: 8/7/2020 8:59 AM  Performed by: Edwin Willis M.D.  Authorized by: Edwin Willis M.D.     Location:  OR  Urgency:  Elective  Indications for Airway Management:  Anesthesia      Spontaneous Ventilation: absent    Sedation Level:  Deep  Preoxygenated: Yes    Patient Position:  Sniffing  Final Airway Type:  Endotracheal airway  Final Endotracheal Airway:  ETT  Cuffed: Yes    Technique Used for Successful ETT Placement:  Direct laryngoscopy    Insertion Site:  Oral  Blade Type:  Latasha  Laryngoscope Blade/Videolaryngoscope Blade Size:  3  ETT Size (mm):  7.5  Measured from:  Teeth  ETT to Teeth (cm):  22  Placement Verified by: auscultation and capnometry    Cormack-Lehane Classification:  Grade I - full view of glottis  Number of Attempts at Approach:  1

## 2020-08-07 NOTE — OR NURSING
"1056 patient to stage 2  Patient settled in recliner chair post short ambulation from [facility] - pt dressed with assist by CNA. Pt reports pain to L \"under rib cage pain and radiating up to chest\". Pt denies SOB, numbness or any other problems. Pt reports that this was not improved by pain medication but \"oh yeah, it's tolerable\". Denies nausea.   1100 Dr Abdul here and informed of L \"under rib cage pain and radiating up to chest\"  Rated as 4/10 but abdomen soft and non tender. VORB per Dr Abdul okay to discharge. IVF continue to infuse and instructed by ADOLFO Landeros to complete LR in stage II.   1127 Reviewed discharge instructions with pt/spouse; both state verbal understanding.   1140 D/Sagar to care of family post uneventful stay in PACU 2.  "

## 2020-08-07 NOTE — DISCHARGE INSTRUCTIONS
ENDOSCOPY HOME CARE INSTRUCTIONS    GASTROSCOPY OR ERCP  1. Don't eat or drink anything for about an hour after the test. You can then resume your regular diet.  2. Don't drive or drink alcohol for 24 hours. The medication you received will make you too drowsy.  3. Don't take any coffee, tea, or aspirin products until after you see your doctor. These can harm the lining of your stomach.  4. If you begin to vomit bloody material, or develop black or bloody stools, call your doctor as soon as possible.  5. If you have any neck, chest, abdominal pain or temp of 100 degrees, call your doctor.  6. See your doctor. Call for appoinment.  7. Additional instructions:      No alcohol or sleeping pills today.   Patient not to drive, operate heavy machinery or make important decisions for 24 hours.   Please confirm that patient has a responsible adult /chaparone to accompany patient.    [facility] office will call for follow-up with Dr. Andrea Mancilla.     Results to convey to patient: common bile duct (tubes draining liver) stone removed after biliary sphincterotomy widening of opening to help drainage.        You should call 911 if you develop problems with breathing or chest pain.  If any questions arise, call your doctor. If your doctor is not available, please feel free to call [phone]. You can also call the HEALTH HOTLINE open 24 hours/day, 7 days/week and speak to a nurse at [phone], or toll free [phone].    Depression / Suicide Risk    As you are discharged from [facility], it is important to learn how to keep safe from harming yourself.    Recognize the warning signs:  · Abrupt changes in personality, positive or negative- including increase in energy   · Giving away possessions  · Change in eating patterns- significant weight changes-  positive or negative  · Change in sleeping patterns- unable to sleep or sleeping all the time   · Unwillingness or inability to  communicate  · Depression  · Unusual sadness, discouragement and loneliness  · Talk of wanting to die  · Neglect of personal appearance   · Rebelliousness- reckless behavior  · Withdrawal from people/activities they love  · Confusion- inability to concentrate     If you or a loved one observes any of these behaviors or has concerns about self-harm, here's what you can do:  · Talk about it- your feelings and reasons for harming yourself  · Remove any means that you might use to hurt yourself (examples: pills, rope, extension cords, firearm)  · Get professional help from the community (Mental Health, Substance Abuse, psychological counseling)  · Do not be alone:Call your Safe Contact- someone whom you trust who will be there for you.  · Call your local CRISIS HOTLINE 982-9025 or 715-411-2666  · Call your local Children's Mobile Crisis Response Team [place] (629) 194-3975 or www.InsightSquared  · Call the toll free National Suicide Prevention Hotlines   · National Suicide Prevention Lifeline 501-293-QXSA (7189)  · National Hope Line Network 800-SUICIDE (150-8485)    I acknowledge receipt and understanding of these Home Care Instructions.

## 2020-08-07 NOTE — OR NURSING
1009 Report received from Tigre MATA. Pt c/o discomfort denies wanting any pain medication at this time.   1025 Report given to Tigre MATA.

## 2020-08-07 NOTE — ANESTHESIA PREPROCEDURE EVALUATION
Relevant Problems   ANESTHESIA (within normal limits)      PULMONARY (within normal limits)      NEURO (within normal limits)      CARDIAC   (+) Hypertension      GI (within normal limits)       (within normal limits)      ENDO (within normal limits)      OB (within normal limits)      Other   (+) Chronic back pain   (+) Elevated liver enzymes   (+) Metabolic syndrome       Physical Exam    Airway   Mallampati: II  TM distance: >3 FB  Neck ROM: full       Cardiovascular - normal exam  Rhythm: regular  Rate: normal  (-) murmur     Dental - normal exam  (+) upper dentures, lower dentures           Pulmonary - normal exam  Breath sounds clear to auscultation     Abdominal    Neurological - normal exam                 Anesthesia Plan    ASA 2       Plan - general       Airway plan will be ETT        Induction: intravenous    Postoperative Plan: Postoperative administration of opioids is intended.    Pertinent diagnostic labs and testing reviewed    Informed Consent:    Anesthetic plan and risks discussed with patient.    Use of blood products discussed with: patient whom consented to blood products.

## 2020-08-07 NOTE — OR NURSING
Patient to preop, allergies and NPO status verified, home medications reconciled, belongings secured, verbalizes understanding of pain scale, surgical site verified, IV access established, SCDs/ CHERELLE hose in place, triple aim completed.

## 2020-08-07 NOTE — PROCEDURES
Pre-procedure Diagnoses   Abnormal abdominal CT scan [R93.5]   Pelvic abscess in male (HCC) [K65.1]   History of laparoscopic cholecystectomy [Z90.49]   Generalized abdominal pain [R10.84]   Weight loss, unintentional [R63.4]   Acute biliary pancreatitis with uninfected necrosis [K85.11]   Abnormal liver enzymes [R74.8]   Post-procedure Diagnoses   Chronic biliary pancreatitis (HCC) [K86.1]   Choledocholithiasis [K80.50]   Procedures   ENDOSCOPIC RETROGRADE CHOLANGIOPANCREATOGRAPHY (ERCP) WITH REMOVAL STONES FROM BILIARY DUCTS [84509 (CPT®)]   ENDOSCOPIC RETROGRADE CHOLANGIOPANCREATOGRAPHY ERCP, BILIARY SPHINCTEROTOMY [24816 (CPT®)]   PANCREATOBILIARY UPPER ENDOSCOPIC ULTRASOUND EXAM [54296 (CPT®)]   ESOPHAGOGASTRODUODENOSCOPY OR UPPER GI ENDOSCOPY WITH BIOPSIES [69323 (CPT®)]   CHOLANGIOGRAM X-RAYS FOR BILE DUCTS VIA ENDOSCOPY [44526 (CPT®)]       Endoscopist: Augie Abdul MD, [facility], [facility]    General anesthesia: Dr. Subhash Wlilis    Consent: Risks, benefits, and alternatives of aforementioned procedures were discussed with patient and wife (Milly).  Consenting persons were given opportunities to ask questions and discuss other options.  Risks including but not limited to pancreatitis, contrast reaction, radiation exposure, possible need for stent placement and if placed patient responsibility to schedule follow-up for removal, perforation, infection, bleeding, missed lesion(s), possible need for surgery(ies) and/or interventional radiology, possible need for repeat procedure(s) and/or additional testing, hospitalization possibly prolonged, cardiac and/or pulmonary event, aspiration, hypoxia, stroke, medication and/or anesthesia reaction, indefinite diagnosis, discomfort, unsuccessful and/or incomplete procedure, ineffective therapy and/or persistent symptoms, damage to adjacent organs and/or vascular structures, and other adverse events possibly life-threatening.  Interactive discussion was undertaken with Layman's  "terms.  I answered questions in full and to satisfaction.  Consenting persons stated understanding and acceptance of these risks, and wished to proceed.  Informed consent was given in clear state of mind.    Endoscopic procedures in detail: Diagnostic endoscope was inserted from mouth into second portion of the duodenum, retroflexion was performed in the stomach.  Gastric and duodenal biopsies were obtained.  I suctioned insufflated air and stomach fluid contents upon removal.      Curvilinear echoendoscope was inserted from mouth to second portion of the duodenum.  Pancreas was examined with identification of normal vascular landmarks including superior mesenteric artery, superior mesenteric vein, portal vein, celiac artery, portal vein confluence, and splenorenal junction.  Biliary duct was imaged and traced from intrapancreatic portion to hepatic hilum.  Celiac axis was imaged to look for echogenic lymph nodes.    ERCP scope was inserted from mouth to 2nd portion of the duodenum.  Biliary duct was selectively cannulated on the first attempt, successful free cannulation was achieved.  Cholangiogram was injected and completed.  Biliary sphincterotomy was performed with a bow papillotome with subsequent balloon common bile duct stones extraction, clearance was complete without evidence of any residual common bile duct stones on completion cholangiogram. The C-arm was moved and rotated on rainbow axis to optimize imaging of intrahepatic biliary systems in different angles to avoid missing lesions/strictures with ductal overlap and/or image angulation. The balloon was inflated within the intrahepatic ducts and dragged through the entire length of the bile duct out the biliary os multiple times in order to minimize risk of retained stones. The ERCP scope was removed from duodenum to also image the common bile duct \"behind\" the ERCP scope.  Of note, no radiologist was present to help obtain nor read ERCP images.  I was " the sole physician who obtained and performed interpretation of static and dynamic ERCP fluoroscopic x-ray images, no over-read was requested from the radiologist nor was it necessary.  I suctioned insufflated air and stomach fluid contents upon removal.    Procedure times:  - In-room 08:55  - Start 09:02  - Completed 09:20  - Out of room per nursing records    Esophagogastroduodenoscopy Findings:  - Esophagus: endoscopically unremarkable.   - Stomach: endoscopically unremarkable, gastric biopsies obtained.  - Duodenum: endoscopically unremarkable to 2nd portion, duodenal biopsies obtained.    Endoscopic Ultrasound Findings:  - Pancreas: chronic pancreatitis with hyperechoic foci, lobularity, hyperechoic stranding and irregular hyperechoic pancreatic duct wall.  - Bile duct: choledocholithiasis biliary sludge with dilation.  - Celiac axis: no echogenic lymph nodes.    Endoscopic Retrograde CholangioPancreatography Findings:  - Ampulla of Vater: located in 2nd portion of duodenum, endoscopically unremarkable.  - Cholangiogram: distal filling defects with upstream ductal dilation.  Surgical clips consistent with cholecystectomy.  - Pancreatogram: intentionally not injected nor cannulated.  - Therapy: biliary sphincterotomy with balloon extraction, clearance completed; yellowish sludge and small 3mm black stone removed.    Impression:  1. Choledocholithaisis, balloon extracted after biliary sphincterotomy  2. Chronic pancreatitis  3. Gastric and duodenal biopsies to evaluate H.pylori status and to exclude celiac sprue  4. Prior cholecystectomy    Recommendations:   1.  Routine post-endoscopy anesthesia recovery care.  Discharge patient when he is awake, alert and comfortable, when recovery criteria are met.  Aspiration and fall precautions x 24 hours.   2. Follow-up with established GI Dr. Andrea Mancilla in 4 to 6 weeks to consider pancreatic protocol CT imaging for surveillance.   3. I spoke to patient, wife and  recovery nurse about impression, diagnosis and recommendations.  I answered questions in full and to satisfaction

## 2020-08-07 NOTE — ANESTHESIA TIME REPORT
Anesthesia Start and Stop Event Times     Date Time Event    8/7/2020 0743 Ready for Procedure     0854 Anesthesia Start     0928 Anesthesia Stop        Responsible Staff  08/07/20    Name Role Begin End    Edwin Willis M.D. Anesth 0854 0928        Preop Diagnosis (Free Text):  Pre-op Diagnosis     ALKALINE PHOSPHATASE        Preop Diagnosis (Codes):    Post op Diagnosis  Elevated liver enzymes      Premium Reason  Non-Premium    Comments:

## 2020-08-07 NOTE — ANESTHESIA POSTPROCEDURE EVALUATION
Patient: Leonard Christensen    Procedure Summary     Date: 08/07/20 Room / Location:  ENDOSCOPIC ULTRASOUND ROOM / SURGERY [facility]    Anesthesia Start: 0854 Anesthesia Stop: 0928    Procedures:       ERCP, DIAGNOSTIC - WITH SPHINCTEROTOMY WITH COMMON BILE DUCT STONE EXTRACTION WITH TEMPORARY STENT PLACEMENT (Abdomen)      GASTROSCOPY      EGD, WITH ENDOSCOPIC US - UPPER      EGD, WITH ASPIRATION BIOPSY - POSSIBLE Diagnosis: (ALKALINE PHOSPHATASE)    Surgeon: Augie Abdul M.D. Responsible Provider: Edwin Willis M.D.    Anesthesia Type: general ASA Status: 2          Final Anesthesia Type: general  Last vitals  BP   Blood Pressure: (!) 165/79    Temp   36.4 °C (97.5 °F)    Pulse   Pulse: 70   Resp   18    SpO2   99 %      Anesthesia Post Evaluation    Patient location during evaluation: PACU  Patient participation: complete - patient participated  Level of consciousness: awake and alert  Pain score: 0    Airway patency: patent  Anesthetic complications: no  Cardiovascular status: hemodynamically stable  Respiratory status: acceptable  Hydration status: euvolemic    PONV: none           Nurse Pain Score: 0 (NPRS)

## 2020-08-07 NOTE — PROGRESS NOTES
Patient seen and examined before proceeding with EGD biopsies, EUS possible FNA +/- ERCP sphincterotomy and common bile duct stones extraction and possible temporary stent placement under x-ray fluoroscopy and anesthesia.  Risks, benefits, and alternatives of aforementioned procedures were discussed with patient and wife (Milly).  Consenting persons were given opportunities to ask questions and discuss other options.  Risks including but not limited to pancreatitis, contrast reaction, radiation exposure, possible need for stent placement and if placed patient responsibility to schedule follow-up for removal, perforation, infection, bleeding, missed lesion(s), possible need for surgery(ies) and/or interventional radiology, possible need for repeat procedure(s) and/or additional testing, hospitalization possibly prolonged, cardiac and/or pulmonary event, aspiration, hypoxia, stroke, medication and/or anesthesia reaction, indefinite diagnosis, discomfort, unsuccessful and/or incomplete procedure, ineffective therapy and/or persistent symptoms, damage to adjacent organs and/or vascular structures, and other adverse events possibly life-threatening.  Interactive discussion was undertaken with Layman's terms. I answered questions in full and to satisfaction.  Consenting persons stated understanding and acceptance of these risks, and wished to proceed.  Informed consent was given in clear state of mind.

## 2020-08-11 ENCOUNTER — HOSPITAL ENCOUNTER (OUTPATIENT)
Dept: RADIOLOGY | Facility: MEDICAL CENTER | Age: 55
End: 2020-08-11
Attending: INTERNAL MEDICINE
Payer: MEDICAID

## 2020-08-11 DIAGNOSIS — K65.1 PERITONEAL ABSCESS (HCC): ICD-10-CM

## 2020-08-11 DIAGNOSIS — R10.30 ABDOMINAL PAIN, LOWER: ICD-10-CM

## 2020-08-11 DIAGNOSIS — R74.01 ELEVATED ALANINE AMINOTRANSFERASE (ALT) LEVEL: ICD-10-CM

## 2020-08-11 DIAGNOSIS — E78.5 DYSLIPIDEMIA: ICD-10-CM

## 2020-08-11 DIAGNOSIS — K85.90 ACUTE PANCREATITIS, UNSPECIFIED COMPLICATION STATUS, UNSPECIFIED PANCREATITIS TYPE: ICD-10-CM

## 2020-08-11 DIAGNOSIS — R10.84 ABDOMINAL PAIN, GENERALIZED: ICD-10-CM

## 2020-08-11 DIAGNOSIS — R74.8 ACID PHOSPHATASE ELEVATED: ICD-10-CM

## 2020-08-11 DIAGNOSIS — R63.4 LOSS OF WEIGHT: ICD-10-CM

## 2020-08-11 PROCEDURE — 74178 CT ABD&PLV WO CNTR FLWD CNTR: CPT

## 2020-08-11 PROCEDURE — 700117 HCHG RX CONTRAST REV CODE 255: Performed by: INTERNAL MEDICINE

## 2020-08-11 RX ADMIN — IOHEXOL 100 ML: 350 INJECTION, SOLUTION INTRAVENOUS at 09:05

## 2020-12-14 RX ORDER — ATORVASTATIN CALCIUM 20 MG/1
TABLET, FILM COATED ORAL
Qty: 30 TAB | Refills: 11 | OUTPATIENT
Start: 2020-12-14

## 2021-03-15 DIAGNOSIS — Z23 NEED FOR VACCINATION: ICD-10-CM

## 2021-05-25 DIAGNOSIS — E78.5 DYSLIPIDEMIA: ICD-10-CM

## 2021-06-14 RX ORDER — LISINOPRIL AND HYDROCHLOROTHIAZIDE 12.5; 1 MG/1; MG/1
TABLET ORAL
Qty: 30 TABLET | Refills: 1 | OUTPATIENT
Start: 2021-06-14

## 2021-06-17 ENCOUNTER — DOCUMENTATION (OUTPATIENT)
Dept: VASCULAR LAB | Facility: MEDICAL CENTER | Age: 56
End: 2021-06-17

## 2021-06-17 NOTE — PROGRESS NOTES
LVM informing pt of what Dr. Bloch said below.     Michael J Bloch, M.D.  Althea Steele  Let him know that I will have someone from [place] office call him.   Mb         Previous Messages       ----- Message -----   From: Althea Steele   Sent: 6/15/2021  10:05 AM PDT   To: Michael J Bloch, M.D.   Subject: RE: f/u in september                             Hi Dr. Bloch,     I scheduled pt with you on 09/09.     Pt wants to know -     1. Can you order lancet needles?     2. Can you order lab work which includes looking at his Vitamin D levels?     Thanks,     Althea   ----- Message -----   From: Michael J Bloch, M.D.   Sent: 5/25/2021   5:06 PM PDT   To: Althea Steele   Subject: f/u in september                                 Pls make patient a f/u appt (20 min) with me in september - transfer from [place]

## 2021-09-09 ENCOUNTER — OFFICE VISIT (OUTPATIENT)
Dept: VASCULAR LAB | Facility: MEDICAL CENTER | Age: 56
End: 2021-09-09
Attending: INTERNAL MEDICINE
Payer: MEDICAID

## 2021-09-09 DIAGNOSIS — I10 HYPERTENSION, UNSPECIFIED TYPE: ICD-10-CM

## 2021-09-09 DIAGNOSIS — E11.9 TYPE 2 DIABETES MELLITUS WITHOUT COMPLICATION, WITHOUT LONG-TERM CURRENT USE OF INSULIN (HCC): ICD-10-CM

## 2021-09-09 DIAGNOSIS — E78.5 DYSLIPIDEMIA: ICD-10-CM

## 2021-09-09 DIAGNOSIS — K85.90 ACUTE PANCREATITIS, UNSPECIFIED COMPLICATION STATUS, UNSPECIFIED PANCREATITIS TYPE: ICD-10-CM

## 2021-09-09 DIAGNOSIS — R74.8 ELEVATED LIVER ENZYMES: ICD-10-CM

## 2021-09-09 PROCEDURE — 99214 OFFICE O/P EST MOD 30 MIN: CPT | Performed by: INTERNAL MEDICINE

## 2021-09-09 ASSESSMENT — ENCOUNTER SYMPTOMS
ABDOMINAL PAIN: 1
PALPITATIONS: 0
DIARRHEA: 1
SHORTNESS OF BREATH: 0

## 2021-09-09 NOTE — PROGRESS NOTES
VASCULAR MEDICINE CLINIC - Follow Up Visit  09/09/21     Leonard Christensen is a 56 y.o.  male who presents today  for vascular f/u    This visit was conducted via zoom video call using secure and encrypted video conferencing technology due to covid-19 restrictions.   The patient was in a private location in the state [facility].    The patient's identity was confirmed and verbal consent was obtained for this virtual visit.     HPI:  Patient here for follow up of dyslipidemia, htn, and dm  Following up with GI. Awaiting MRCP ordered by GI.   occ abd pain and loose stools  No weight loss - if anything up a few pounds  Very limited etoh  Tolerating atorvastatin   Had blood work  BP all <130/80 at [facility]  On lisinopril hct     DIET AND EXERCISE:  Weight Change:up a few pounds  Diet: decent dm diet  Exercise: sporadic irregular exercise     Review of Systems   Respiratory: Negative for shortness of breath.    Cardiovascular: Negative for chest pain, palpitations and leg swelling.   Gastrointestinal: Positive for abdominal pain and diarrhea.         Objective:     There were no vitals filed for this visit.     Physical Exam  Constitutional:       General: He is not in acute distress.     Appearance: He is not diaphoretic.   HENT:      Head: Normocephalic and atraumatic.   Eyes:      General: No scleral icterus.     Conjunctiva/sclera: Conjunctivae normal.   Pulmonary:      Effort: Pulmonary effort is normal. No respiratory distress.   Skin:     Coloration: Skin is not pale.   Neurological:      Mental Status: He is alert and oriented to person, place, and time.      Cranial Nerves: No cranial nerve deficit.   Psychiatric:         Mood and Affect: Affect normal.         Judgment: Judgment normal.          DATA REVIEW    Lab Results   Component Value Date/Time    CHOLSTRLTOT 123 07/01/2020 06:35 AM    LDL 60 07/01/2020 06:35 AM    HDL 34 (A) 07/01/2020 06:35 AM    TRIGLYCERIDE 146 07/01/2020 06:35 AM       Lab Results    Component Value Date/Time    SODIUM 134 (L) 08/04/2020 12:01 PM    POTASSIUM 4.7 08/04/2020 12:01 PM    CHLORIDE 95 (L) 08/04/2020 12:01 PM    CO2 25 08/04/2020 12:01 PM    GLUCOSE 293 (H) 08/04/2020 12:01 PM    BUN 13 08/04/2020 12:01 PM    CREATININE 0.72 08/04/2020 12:01 PM    BUNCREATRAT 19 11/02/2016 09:13 AM     Lab Results   Component Value Date/Time    ALKPHOSPHAT 1182 (H) 07/06/2020 04:40 AM    ASTSGOT 101 (H) 07/06/2020 04:40 AM    ALTSGPT 130 (H) 07/06/2020 04:40 AM    TBILIRUBIN 0.5 07/06/2020 04:40 AM       Lab Results   Component Value Date/Time    HBA1C 5.9 (H) 11/02/2016 09:13 AM    HBA1C 6.5 (H) 12/22/2014 11:25 AM       Lab Results   Component Value Date/Time    MALBCRT see below 12/22/2014 11:25 AM    MICROALBUR <0.5 12/22/2014 11:25 AM    MICRALB 18.1 07/29/2016 09:41 AM       Blood work from [facility] aug 2021:  Na 136, 4.8, ast 50, alt 50, GFR >60  a1c 6.3  Lipase 377  Total c 182, trig 288, hdc 48, ldl 56  Ck normal    Medical Decision Making:  Today's Assessment / Status / Plan:     1. Dyslipidemia  Comp Metabolic Panel    Lipid Profile    VITAMIN D,25 HYDROXY   2. Elevated liver enzymes     3. Hypertension, unspecified type  CBC WITHOUT DIFFERENTIAL    Comp Metabolic Panel    MICROALBUMIN CREAT RATIO URINE   4. Acute pancreatitis, unspecified complication status, unspecified pancreatitis type     5. Type 2 diabetes mellitus without complication, without long-term current use of insulin (HCC)  metformin (GLUCOPHAGE) 1000 MG tablet    HEMOGLOBIN A1C    MICROALBUMIN CREAT RATIO URINE      Patient Type: Primary Prevention    Etiology of Established CVD if Present: None, but does have 5 of 5 components of metabolic syndrome at baseline    Lipid Management: Qualifies for Statin Therapy Based on 2018 ACC/AHA Guidelines: yes  Calculated 10-Year Risk of ASCVD: N/A  Currently on Statin: Yes  LDL under excellent control, but trigs/nonHDL remains a bit high  -Continue atorvastatin at current dose for  now  -Recheck fasting lipids in 6 months  -Consider intensification of therapy with addition of prescription strength omega-3 if non-HDL or triglycerides remain high    Blood Pressure Management:  Acc[place] (2017) Blood Pressure Goal <130/80  Blood pressure under reasonable control based at home  -Continue lisinopril HCT  -Continue home blood pressure monitoring    Glycemic Status: Diabetic  A1c goal less than 7 at least  Most recent A1c shows reasonable control  -Continue Metformin  -Continue lifestyle modification  -Recheck A1c in 6 months    Anti-Platelet/Anti-Coagulant Tx: no  Not necessarily indicated at present    Smoking: Former smoker with history of recidivism   - continue complete abstinence from all tobacco products    Physical Activity: Encourage more walking    Weight Management and Nutrition: Dietary plan was discussed with patient at this visit including continue good diabetic diet    Other:    1.  Choledocholithiasis -GGT and lipase remain elevated.  Patient remains somewhat symptomatic.  He denies alcohol use.  His triglycerides are high enough to be driving pancreatitis.  He apparently has been scheduled for MRCP by his GI doctor.  Will defer all further work-up and management to PCP and GI    Instructed to follow-up with PCP for remainder of adult medical needs: yes  We will partner with other providers in the management of established vascular disease and cardiometabolic risk factors.    Studies to Be Obtained: Per GI  Labs to Be Obtained: As above prior to next visit    Follow up in: 6 months    Time: 30-39min - chart review/prep, review of other providers' records, imaging/lab review, face-to-face time for history/examination, ordering, prescribing,  review of results/meds/ treatment plan with patient/family/caregiver, documentation in EMR, care coordination (as needed)    Michael J Bloch, M.D.     Cc:    Augie Owens

## 2021-09-29 ENCOUNTER — HOSPITAL ENCOUNTER (OUTPATIENT)
Dept: RADIOLOGY | Facility: MEDICAL CENTER | Age: 56
End: 2021-09-29
Attending: INTERNAL MEDICINE
Payer: MEDICAID

## 2021-09-29 DIAGNOSIS — K85.90 ACUTE PANCREATITIS, UNSPECIFIED COMPLICATION STATUS, UNSPECIFIED PANCREATITIS TYPE: ICD-10-CM

## 2021-09-29 DIAGNOSIS — R74.01 ELEVATED ALANINE AMINOTRANSFERASE (ALT) LEVEL: ICD-10-CM

## 2021-09-29 PROCEDURE — 74181 MRI ABDOMEN W/O CONTRAST: CPT

## 2021-10-15 ENCOUNTER — PRE-ADMISSION TESTING (OUTPATIENT)
Dept: ADMISSIONS | Facility: MEDICAL CENTER | Age: 56
End: 2021-10-15
Attending: INTERNAL MEDICINE
Payer: MEDICAID

## 2021-10-15 DIAGNOSIS — Z01.812 PRE-OPERATIVE LABORATORY EXAMINATION: ICD-10-CM

## 2021-10-15 DIAGNOSIS — Z01.810 PRE-OPERATIVE CARDIOVASCULAR EXAMINATION: ICD-10-CM

## 2021-10-15 LAB
ALBUMIN SERPL BCP-MCNC: 4.3 G/DL (ref 3.2–4.9)
ALBUMIN/GLOB SERPL: 2 G/DL
ALP SERPL-CCNC: 199 U/L (ref 30–99)
ALT SERPL-CCNC: 483 U/L (ref 2–50)
ANION GAP SERPL CALC-SCNC: 14 MMOL/L (ref 7–16)
AST SERPL-CCNC: 578 U/L (ref 12–45)
BILIRUB SERPL-MCNC: 1.2 MG/DL (ref 0.1–1.5)
BUN SERPL-MCNC: 16 MG/DL (ref 8–22)
CALCIUM SERPL-MCNC: 9.3 MG/DL (ref 8.4–10.2)
CHLORIDE SERPL-SCNC: 95 MMOL/L (ref 96–112)
CO2 SERPL-SCNC: 26 MMOL/L (ref 20–33)
CREAT SERPL-MCNC: 0.76 MG/DL (ref 0.5–1.4)
EKG IMPRESSION: NORMAL
ERYTHROCYTE [DISTWIDTH] IN BLOOD BY AUTOMATED COUNT: 42.1 FL (ref 35.9–50)
GLOBULIN SER CALC-MCNC: 2.1 G/DL (ref 1.9–3.5)
GLUCOSE SERPL-MCNC: 125 MG/DL (ref 65–99)
HCT VFR BLD AUTO: 41.7 % (ref 42–52)
HGB BLD-MCNC: 14.1 G/DL (ref 14–18)
MCH RBC QN AUTO: 31.6 PG (ref 27–33)
MCHC RBC AUTO-ENTMCNC: 33.8 G/DL (ref 33.7–35.3)
MCV RBC AUTO: 93.5 FL (ref 81.4–97.8)
PLATELET # BLD AUTO: 175 K/UL (ref 164–446)
PMV BLD AUTO: 9.7 FL (ref 9–12.9)
POTASSIUM SERPL-SCNC: 4.4 MMOL/L (ref 3.6–5.5)
PROT SERPL-MCNC: 6.4 G/DL (ref 6–8.2)
RBC # BLD AUTO: 4.46 M/UL (ref 4.7–6.1)
SODIUM SERPL-SCNC: 135 MMOL/L (ref 135–145)
WBC # BLD AUTO: 7.5 K/UL (ref 4.8–10.8)

## 2021-10-15 PROCEDURE — U0005 INFEC AGEN DETEC AMPLI PROBE: HCPCS

## 2021-10-15 PROCEDURE — 93005 ELECTROCARDIOGRAM TRACING: CPT

## 2021-10-15 PROCEDURE — 93010 ELECTROCARDIOGRAM REPORT: CPT | Performed by: INTERNAL MEDICINE

## 2021-10-15 PROCEDURE — 85027 COMPLETE CBC AUTOMATED: CPT

## 2021-10-15 PROCEDURE — U0003 INFECTIOUS AGENT DETECTION BY NUCLEIC ACID (DNA OR RNA); SEVERE ACUTE RESPIRATORY SYNDROME CORONAVIRUS 2 (SARS-COV-2) (CORONAVIRUS DISEASE [COVID-19]), AMPLIFIED PROBE TECHNIQUE, MAKING USE OF HIGH THROUGHPUT TECHNOLOGIES AS DESCRIBED BY CMS-2020-01-R: HCPCS

## 2021-10-15 PROCEDURE — C9803 HOPD COVID-19 SPEC COLLECT: HCPCS

## 2021-10-15 PROCEDURE — 80053 COMPREHEN METABOLIC PANEL: CPT

## 2021-10-15 PROCEDURE — 36415 COLL VENOUS BLD VENIPUNCTURE: CPT

## 2021-10-15 RX ORDER — ERGOCALCIFEROL 1.25 MG/1
CAPSULE ORAL
COMMUNITY

## 2021-10-15 ASSESSMENT — FIBROSIS 4 INDEX: FIB4 SCORE: 2.25

## 2021-10-15 NOTE — PREPROCEDURE INSTRUCTIONS
Hx and meds reviewed, pre-op instructions given, handouts reviewed, questions answered.  Patient instructed to continue regularly prescribed medications through day before surgery.  Instructed to discontinue all vitamins and supplements 2 week prior to DOS.  Per anesthesia protocol patient instructed to take these medications with a sip of water on the day of surgery: Chantix, oxycodone.  Anesthesia fasting guidelines reviewed with patient.  Covid testing scheduled 10/15/21.  Patient has been vaccinated for Covid.

## 2021-10-16 LAB
SARS-COV-2 RNA RESP QL NAA+PROBE: NOTDETECTED
SPECIMEN SOURCE: NORMAL

## 2021-10-18 ENCOUNTER — ANESTHESIA EVENT (OUTPATIENT)
Dept: SURGERY | Facility: MEDICAL CENTER | Age: 56
End: 2021-10-18
Payer: MEDICAID

## 2021-10-18 ENCOUNTER — APPOINTMENT (OUTPATIENT)
Dept: RADIOLOGY | Facility: MEDICAL CENTER | Age: 56
End: 2021-10-18
Attending: INTERNAL MEDICINE
Payer: MEDICAID

## 2021-10-18 ENCOUNTER — HOSPITAL ENCOUNTER (OUTPATIENT)
Facility: MEDICAL CENTER | Age: 56
End: 2021-10-18
Attending: INTERNAL MEDICINE | Admitting: INTERNAL MEDICINE
Payer: MEDICAID

## 2021-10-18 ENCOUNTER — ANESTHESIA (OUTPATIENT)
Dept: SURGERY | Facility: MEDICAL CENTER | Age: 56
End: 2021-10-18
Payer: MEDICAID

## 2021-10-18 VITALS
RESPIRATION RATE: 16 BRPM | SYSTOLIC BLOOD PRESSURE: 149 MMHG | HEART RATE: 75 BPM | DIASTOLIC BLOOD PRESSURE: 84 MMHG | OXYGEN SATURATION: 98 % | WEIGHT: 169.75 LBS | TEMPERATURE: 97.7 F | BODY MASS INDEX: 23.77 KG/M2 | HEIGHT: 71 IN

## 2021-10-18 PROCEDURE — 160202 HCHG ENDO MINUTES - 1ST 30 MINS LEVEL 3: Performed by: INTERNAL MEDICINE

## 2021-10-18 PROCEDURE — 160048 HCHG OR STATISTICAL LEVEL 1-5: Performed by: INTERNAL MEDICINE

## 2021-10-18 PROCEDURE — 700111 HCHG RX REV CODE 636 W/ 250 OVERRIDE (IP): Performed by: ANESTHESIOLOGY

## 2021-10-18 PROCEDURE — 110371 HCHG SHELL REV 272: Performed by: INTERNAL MEDICINE

## 2021-10-18 PROCEDURE — 700101 HCHG RX REV CODE 250: Performed by: INTERNAL MEDICINE

## 2021-10-18 PROCEDURE — 160025 RECOVERY II MINUTES (STATS): Performed by: INTERNAL MEDICINE

## 2021-10-18 PROCEDURE — 160009 HCHG ANES TIME/MIN: Performed by: INTERNAL MEDICINE

## 2021-10-18 PROCEDURE — 160207 HCHG ENDO MINUTES - EA ADDL 1 MIN LEVEL 3: Performed by: INTERNAL MEDICINE

## 2021-10-18 PROCEDURE — 160046 HCHG PACU - 1ST 60 MINS PHASE II: Performed by: INTERNAL MEDICINE

## 2021-10-18 PROCEDURE — 160002 HCHG RECOVERY MINUTES (STAT): Performed by: INTERNAL MEDICINE

## 2021-10-18 PROCEDURE — 700105 HCHG RX REV CODE 258: Performed by: INTERNAL MEDICINE

## 2021-10-18 PROCEDURE — 700101 HCHG RX REV CODE 250: Performed by: ANESTHESIOLOGY

## 2021-10-18 PROCEDURE — C1726 CATH, BAL DIL, NON-VASCULAR: HCPCS | Performed by: INTERNAL MEDICINE

## 2021-10-18 PROCEDURE — 160035 HCHG PACU - 1ST 60 MINS PHASE I: Performed by: INTERNAL MEDICINE

## 2021-10-18 PROCEDURE — 160047 HCHG PACU  - EA ADDL 30 MINS PHASE II: Performed by: INTERNAL MEDICINE

## 2021-10-18 RX ORDER — HALOPERIDOL 5 MG/ML
1 INJECTION INTRAMUSCULAR
Status: DISCONTINUED | OUTPATIENT
Start: 2021-10-18 | End: 2021-10-18 | Stop reason: HOSPADM

## 2021-10-18 RX ORDER — OXYCODONE HYDROCHLORIDE AND ACETAMINOPHEN 5; 325 MG/1; MG/1
2 TABLET ORAL
Status: DISCONTINUED | OUTPATIENT
Start: 2021-10-18 | End: 2021-10-18 | Stop reason: HOSPADM

## 2021-10-18 RX ORDER — SODIUM CHLORIDE, SODIUM LACTATE, POTASSIUM CHLORIDE, CALCIUM CHLORIDE 600; 310; 30; 20 MG/100ML; MG/100ML; MG/100ML; MG/100ML
INJECTION, SOLUTION INTRAVENOUS CONTINUOUS
Status: DISCONTINUED | OUTPATIENT
Start: 2021-10-18 | End: 2021-10-18 | Stop reason: HOSPADM

## 2021-10-18 RX ORDER — SUCCINYLCHOLINE CHLORIDE 20 MG/ML
INJECTION INTRAMUSCULAR; INTRAVENOUS PRN
Status: DISCONTINUED | OUTPATIENT
Start: 2021-10-18 | End: 2021-10-18 | Stop reason: SURG

## 2021-10-18 RX ORDER — LIDOCAINE HYDROCHLORIDE 20 MG/ML
INJECTION, SOLUTION EPIDURAL; INFILTRATION; INTRACAUDAL; PERINEURAL PRN
Status: DISCONTINUED | OUTPATIENT
Start: 2021-10-18 | End: 2021-10-18 | Stop reason: SURG

## 2021-10-18 RX ORDER — LABETALOL HYDROCHLORIDE 5 MG/ML
5 INJECTION, SOLUTION INTRAVENOUS
Status: DISCONTINUED | OUTPATIENT
Start: 2021-10-18 | End: 2021-10-18 | Stop reason: HOSPADM

## 2021-10-18 RX ORDER — OXYCODONE HYDROCHLORIDE AND ACETAMINOPHEN 5; 325 MG/1; MG/1
1 TABLET ORAL
Status: DISCONTINUED | OUTPATIENT
Start: 2021-10-18 | End: 2021-10-18 | Stop reason: HOSPADM

## 2021-10-18 RX ORDER — HYDRALAZINE HYDROCHLORIDE 20 MG/ML
5 INJECTION INTRAMUSCULAR; INTRAVENOUS
Status: DISCONTINUED | OUTPATIENT
Start: 2021-10-18 | End: 2021-10-18 | Stop reason: HOSPADM

## 2021-10-18 RX ORDER — ONDANSETRON 2 MG/ML
4 INJECTION INTRAMUSCULAR; INTRAVENOUS
Status: DISCONTINUED | OUTPATIENT
Start: 2021-10-18 | End: 2021-10-18 | Stop reason: HOSPADM

## 2021-10-18 RX ORDER — METOPROLOL TARTRATE 1 MG/ML
1 INJECTION, SOLUTION INTRAVENOUS
Status: DISCONTINUED | OUTPATIENT
Start: 2021-10-18 | End: 2021-10-18 | Stop reason: HOSPADM

## 2021-10-18 RX ORDER — HYDROMORPHONE HYDROCHLORIDE 1 MG/ML
0.2 INJECTION, SOLUTION INTRAMUSCULAR; INTRAVENOUS; SUBCUTANEOUS
Status: DISCONTINUED | OUTPATIENT
Start: 2021-10-18 | End: 2021-10-18 | Stop reason: HOSPADM

## 2021-10-18 RX ORDER — MIDAZOLAM HYDROCHLORIDE 1 MG/ML
1 INJECTION INTRAMUSCULAR; INTRAVENOUS
Status: DISCONTINUED | OUTPATIENT
Start: 2021-10-18 | End: 2021-10-18 | Stop reason: HOSPADM

## 2021-10-18 RX ORDER — DIPHENHYDRAMINE HYDROCHLORIDE 50 MG/ML
12.5 INJECTION INTRAMUSCULAR; INTRAVENOUS
Status: DISCONTINUED | OUTPATIENT
Start: 2021-10-18 | End: 2021-10-18 | Stop reason: HOSPADM

## 2021-10-18 RX ORDER — HYDROMORPHONE HYDROCHLORIDE 1 MG/ML
0.1 INJECTION, SOLUTION INTRAMUSCULAR; INTRAVENOUS; SUBCUTANEOUS
Status: DISCONTINUED | OUTPATIENT
Start: 2021-10-18 | End: 2021-10-18 | Stop reason: HOSPADM

## 2021-10-18 RX ORDER — MEPERIDINE HYDROCHLORIDE 25 MG/ML
12.5 INJECTION INTRAMUSCULAR; INTRAVENOUS; SUBCUTANEOUS
Status: DISCONTINUED | OUTPATIENT
Start: 2021-10-18 | End: 2021-10-18 | Stop reason: HOSPADM

## 2021-10-18 RX ORDER — ONDANSETRON 2 MG/ML
INJECTION INTRAMUSCULAR; INTRAVENOUS PRN
Status: DISCONTINUED | OUTPATIENT
Start: 2021-10-18 | End: 2021-10-18 | Stop reason: SURG

## 2021-10-18 RX ORDER — HYDROMORPHONE HYDROCHLORIDE 1 MG/ML
0.4 INJECTION, SOLUTION INTRAMUSCULAR; INTRAVENOUS; SUBCUTANEOUS
Status: DISCONTINUED | OUTPATIENT
Start: 2021-10-18 | End: 2021-10-18 | Stop reason: HOSPADM

## 2021-10-18 RX ADMIN — ONDANSETRON 4 MG: 2 INJECTION INTRAMUSCULAR; INTRAVENOUS at 10:08

## 2021-10-18 RX ADMIN — SUCCINYLCHOLINE CHLORIDE 100 MG: 20 INJECTION, SOLUTION INTRAMUSCULAR; INTRAVENOUS at 10:08

## 2021-10-18 RX ADMIN — EPHEDRINE SULFATE 25 MG: 50 INJECTION INTRAMUSCULAR; INTRAVENOUS; SUBCUTANEOUS at 10:08

## 2021-10-18 RX ADMIN — SODIUM CHLORIDE, POTASSIUM CHLORIDE, SODIUM LACTATE AND CALCIUM CHLORIDE: 600; 310; 30; 20 INJECTION, SOLUTION INTRAVENOUS at 09:04

## 2021-10-18 RX ADMIN — PROPOFOL 200 MG: 10 INJECTION, EMULSION INTRAVENOUS at 10:08

## 2021-10-18 RX ADMIN — LIDOCAINE HYDROCHLORIDE 0.5 ML: 10 INJECTION, SOLUTION INFILTRATION; PERINEURAL at 09:04

## 2021-10-18 RX ADMIN — LIDOCAINE HYDROCHLORIDE 50 MG: 20 INJECTION, SOLUTION EPIDURAL; INFILTRATION; INTRACAUDAL; PERINEURAL at 10:08

## 2021-10-18 ASSESSMENT — FIBROSIS 4 INDEX: FIB4 SCORE: 8.72

## 2021-10-18 ASSESSMENT — PAIN SCALES - GENERAL: PAIN_LEVEL: 0

## 2021-10-18 NOTE — ANESTHESIA PROCEDURE NOTES
Airway    Date/Time: 10/18/2021 9:39 AM  Performed by: Feroz Adames M.D.  Authorized by: Feroz Adames M.D.     Location:  OR  Urgency:  Elective  Indications for Airway Management:  Anesthesia      Spontaneous Ventilation: absent    Sedation Level:  Deep  Preoxygenated: Yes    Patient Position:  Sniffing  Final Airway Type:  Endotracheal airway  Final Endotracheal Airway:  ETT  Cuffed: Yes    Technique Used for Successful ETT Placement:  Direct laryngoscopy    Insertion Site:  Oral  Blade Type:  Latasha  Laryngoscope Blade/Videolaryngoscope Blade Size:  4  ETT Size (mm):  7.0  Measured from:  Teeth  ETT to Teeth (cm):  22  Placement Verified by: auscultation and capnometry    Cormack-Lehane Classification:  Grade I - full view of glottis  Number of Attempts at Approach:  1

## 2021-10-18 NOTE — OR NURSING
1015: Patient arrived from [facility] via gurney.    Sedation/Resp Status: Spontaneous eye opening to verbal. Respirations spontaneous and non-labored.     Denies pain/nausea.     HR 70s SR; VSS on 6L 02 via mask.     1030: Patient continues A/Ox3 - stable, denies ABD pain/nausea. Tolerating sips of clears.     1031: Dr Buchanan at bedside.     1032: Report to Layla MATA

## 2021-10-18 NOTE — PROCEDURES
DATE OF PROCEDURE:  10/18/2021     PROCEDURES:  Endoscopic retrograde cholangiopancreatography with:  1.  Biliary sphincterotomy.  2.  Biliary ampulla hydraulic dilation.  3.  Bile duct stone removal.  4.  Radiologic interpretation of static and dynamic fluoroscopic images by   ____ at no time was a radiologist present in the room.     INDICATIONS:  Bile duct obstruction, abnormal MRCP.     FINAL IMPRESSION:  1.  Biliary ampulla in the distal second portion of the duodenum.    Post-sphincterotomy anatomy present.  2.  Pancreatic duct neither injected nor cannulated.  3.  Common bile duct, normal course, but dilated down to the level of the   ampulla at least 10 mm.  Distal filling defect present along with a ledge of   unclear significance.     THERAPEUTICS:  1.  Biliary sphincterotomy extended ____ with bow papillotome over covered   wire.  2.  Biliary ampulla hydraulic dilation with a 10-11-12 mm balloon with   excellent results and no distal filling defects or mass seen.  3.  Bile duct stone removal with balloon, productive of soft dark stone.     RECOMMENDATIONS:  1.  Follow liver enzymes.  2.  Repeat imaging with CT versus MRI in 3 months versus endoscopic   ultrasound.     DESCRIPTION OF PROCEDURE:  Prior to the procedure, physical exam was stable.    During procedure, vital signs remained within normal limits.  Prior to   sedation, informed consent was obtained.  Risks, benefits, alternatives   including but not limited to risk of bleeding, infection, perforation, adverse   reaction to medication, failure to identify pathology, pancreatitis and death   explained to the patient at length.  He accepted all risks.  The patient was   prepped in the prone position after intubation and sedation by anesthesia.    Scope tip of the Olympus flexible side-viewing TJF duodenoscope passed to the   level of the biliary ampulla after the gastric pool was suctioned dry.  Of   note, the biliary ampulla was seen in the distal  second portion of the   duodenum and a semi-long position was needed for initial cannulation.  [place]   papillotome with a 0.035 covered wire was utilized for selective cannulation   of the bile duct.  This was achieved on the very first attempt and the wire   passed along the expected course of the bile duct.  Injection of contrast   demonstrated dilated extrahepatic bile duct up to 10-12 mm, but normal course   pillar.  There was a distal filling defect seen.  The intrahepatic biliary   tree did not fill nicely.  The wire was left in place and the biliary   sphincterotomy was extended to 12 mm with bow papillotome over the covered   wire.  The tome pulled with ease through the site.  The tome was removed and   exchanged for a 9-12 mm occlusion balloon, which was passed to the distal   duct, inflated and withdrawn down.  There was gentle resistance at the distal   duct and no initial stone was delivered.  The 12 mm balloon did go through the   sphincterotomy site with some resistance.  Multiple sweeps were made, once   again negative for stone but there was suspicion of a distal edge.  The   balloon was left inflated in the duodenum and pressed up against the ampulla   and injection of contrast was made, demonstrating an asymmetrical ledge at the   distal bile duct.  This was not visualized through the sphincterotomy site,   however.  The balloon was removed and a 10-11-12 mm hydraulic dilation balloon   was exchanged over the wire, passed across the inflated, slowly up to 12 mm   and held in place.  While it was inflated, the distal duct was seen and no   mass was appreciated through the balloon.  This was deflated and removed and   exchanged once again for the 12 mm balloon.  Multiple sweeps were made.  A   soft dark stone was withdrawn.  Despite this, there still seemed to be the   asymmetric ledge at the level of the ampulla on fluoroscopy, but this was not   visualized through the sphincterotomy site.  Repeat  sweeps were made with the   balloon, which now pulled with relative ease through the sphincterotomy site.    Procedure was deemed complete at this time.  The scope was withdrawn.  Air   and liquid were suctioned.  The patient tolerated the procedure well and was   sent to recovery without immediate complications.        ______________________________  MD CARLO ROMAN/ALBERTO    DD:  10/18/2021 10:22  DT:  10/18/2021 10:36    Job#:  [id number]

## 2021-10-18 NOTE — DISCHARGE INSTRUCTIONS
ENDOSCOPY HOME CARE INSTRUCTIONS    GASTROSCOPY OR ERCP  1. Don't eat or drink anything for about an hour after the test. You can then resume your regular diet.  2. Don't drive or drink alcohol for 24 hours. The medication you received will make you too drowsy.  3. Don't take any coffee, tea, or aspirin products until after you see your doctor. These can harm the lining of your stomach.  4. If you begin to vomit bloody material, or develop black or bloody stools, call your doctor as soon as possible.  5. If you have any neck, chest, abdominal pain or temp of 100 degrees, call your doctor.  6. See your doctor call to schedule    Immediate Post OP Note     PreOp Diagnosis:        Bile duct obstruction        PostOp Diagnosis:      Same        Procedure(s):  ERCP, DIAGNOSTIC - THERAPEUTIC - Wound Class: Clean Contaminated     Surgeon(s):  Missael Rehman M.D.       Anesthesiologist: ZABRINA Cuadra Dr.  [facility]  JIL Farrell  [phone]     ERCP with:      Biliary sphincterotomy                          Biliary ampulla hydraulic dilation                          Bile duct stone removal                          Radiologic interpretation of static and dynamic fluoroscopic images     Indication:        Bile duct obstruction, abnormal MRCP     Sedation:         GA (SHIRA Adames)     Findings:                          Ampulla                                      Distal 2nd portion of duodenum                                      Post sphincterotomy anatomy                          Pancreatic duct                                      Neither injected nor cannulated                          CBD                                      Normal course                                      Dilated down to ampulla                                      Distal filling defect and ledge                 Therapeutics                          Biliary sphincterotomy extended to 10 mm with bow papillotome over  covered wire                          Biliary ampulla hydraulic dilation - 10-11-12 mm                          Bile duct stone removal with balloon     Plan:                Follow liver enzymes                          Repeat imaging with CT vs MRI in 3 months vs EUS        10/18/2021 10:13 AM Missael Rehman M.D.    You should call 911 if you develop problems with breathing or chest pain.  If any questions arise, call your doctor. If your doctor is not available, please feel free to call [phone]. You can also call the HEALTH HOTLINE open 24 hours/day, 7 days/week and speak to a nurse at [phone], or toll free [phone].    Depression / Suicide Risk    As you are discharged from this [facility] facility, it is important to learn how to keep safe from harming yourself.    Recognize the warning signs:  · Abrupt changes in personality, positive or negative- including increase in energy   · Giving away possessions  · Change in eating patterns- significant weight changes-  positive or negative  · Change in sleeping patterns- unable to sleep or sleeping all the time   · Unwillingness or inability to communicate  · Depression  · Unusual sadness, discouragement and loneliness  · Talk of wanting to die  · Neglect of personal appearance   · Rebelliousness- reckless behavior  · Withdrawal from people/activities they love  · Confusion- inability to concentrate     If you or a loved one observes any of these behaviors or has concerns about self-harm, here's what you can do:  · Talk about it- your feelings and reasons for harming yourself  · Remove any means that you might use to hurt yourself (examples: pills, rope, extension cords, firearm)  · Get professional help from the community (Mental Health, Substance Abuse, psychological counseling)  · Do not be alone:Call your Safe Contact- someone whom you trust who will be there for you.  · Call your local CRISIS HOTLINE 800-4563 or 123-981-5344  · Call  your local Children's Mobile Crisis Response Team [place] (799) 354-2351 or www.EasyPaint.SumAll  · Call the toll free National Suicide Prevention Hotlines   · National Suicide Prevention Lifeline 067-277-YAHU (1668)  · National Hope Line Network 800-SUICIDE (395-7598)    I acknowledge receipt and understanding of these Home Care Instructions.

## 2021-10-18 NOTE — ANESTHESIA TIME REPORT
Anesthesia Start and Stop Event Times     Date Time Event    10/18/2021 0911 Ready for Procedure     0939 Anesthesia Start     1017 Anesthesia Stop        Responsible Staff  10/18/21    Name Role Begin End    Feroz Adames M.D. Anesth 0939 1017        Preop Diagnosis (Free Text):  Pre-op Diagnosis     AMINOTRANSFERASE ELEVATION, CHOLEDOCHOLITHIASIS        Preop Diagnosis (Codes):  Diagnosis Information     Diagnosis Code(s): Choledocholithiasis [K80.50]        Premium Reason  Non-Premium    Comments:

## 2021-10-18 NOTE — OR SURGEON
Immediate Post OP Note    PreOp Diagnosis: Bile duct obstruction      PostOp Diagnosis: Same      Procedure(s):  ERCP, DIAGNOSTIC - THERAPEUTIC - Wound Class: Clean Contaminated    Surgeon(s):  Missael Rehman M.D.    Anesthesiologist/Type of Anesthesia:  Anesthesiologist: Feroz Adames M.D./General    Surgical Staff:  Circulator: Jaiden Bazan R.N.  Endoscopy Technician: Kayla Kirkland; Barbara Bray; Souleymane Patel  Radiology Technologist: Nicole Fernandes    Specimens removed if any:  * No specimens in log *    Dr. Rehman  [facility]  JIL Farrell  [phone]    ERCP with: Biliary sphincterotomy    Biliary ampulla hydraulic dilation    Bile duct stone removal    Radiologic interpretation of static and dynamic fluoroscopic images    Indication: Bile duct obstruction, abnormal MRCP    Sedation: GA (SHIRA Adames)    Findings:    Ampulla     Distal 2nd portion of duodenum     Post sphincterotomy anatomy    Pancreatic duct     Neither injected nor cannulated    CBD     Normal course     Dilated down to ampulla     Distal filling defect and ledge     Therapeutics    Biliary sphincterotomy extended to 10 mm with bow papillotome over covered wire    Biliary ampulla hydraulic dilation - 10-11-12 mm    Bile duct stone removal with balloon    Plan:  Follow liver enzymes    Repeat imaging with CT vs MRI in 3 months vs EUS      10/18/2021 10:13 AM Missael Rehman M.D.

## 2021-10-18 NOTE — OR NURSING
1032: Care assumed of awake pt who denies pain and nausea and is sipping on po fluids.  1045: Meets criteria to transfer to Stage 2

## 2021-10-18 NOTE — ANESTHESIA PREPROCEDURE EVALUATION
Relevant Problems   CARDIAC   (positive) Hypertension      ENDO   (positive) Type 2 diabetes mellitus without complication, without long-term current use of insulin (HCC)       Physical Exam    Airway   Mallampati: II  TM distance: >3 FB  Neck ROM: full       Cardiovascular - normal exam  Rhythm: regular  Rate: normal  (-) murmur     Dental - normal exam           Pulmonary - normal exam  Breath sounds clear to auscultation     Abdominal    Neurological - normal exam                 Anesthesia Plan    ASA 2       Plan - general       Airway plan will be ETT          Induction: intravenous    Postoperative Plan: Postoperative administration of opioids is intended.    Pertinent diagnostic labs and testing reviewed    Informed Consent:    Anesthetic plan and risks discussed with patient.    Use of blood products discussed with: patient whom consented to blood products.

## 2021-10-18 NOTE — OR NURSING
1048: To stage ll. No pain or nausea. Awaiting wife.  1150: Home care instructions reviewed w/ pt and wife. No questions. Meets criteria for discharge.

## 2021-10-18 NOTE — ANESTHESIA POSTPROCEDURE EVALUATION
Patient: Leonard Christensen    Procedure Summary     Date: 10/18/21 Room / Location:  ENDOSCOPIC ULTRASOUND ROOM / SURGERY [facility]    Anesthesia Start: 0939 Anesthesia Stop:     Procedure: ERCP, DIAGNOSTIC - THERAPEUTIC (N/A ) Diagnosis:       Choledocholithiasis      (CHOLEDOCHOLITHIASIS)    Surgeons: Missael Rehman M.D. Responsible Provider: Feorz Adames M.D.    Anesthesia Type: general ASA Status: 2          Final Anesthesia Type: general  Last vitals  BP   Blood Pressure: 156/80    Temp   36.2 °C (97.2 °F)    Pulse   79   Resp   16    SpO2   98 %      Anesthesia Post Evaluation    Patient location during evaluation: PACU  Patient participation: complete - patient participated  Level of consciousness: awake and alert  Pain score: 0    Airway patency: patent  Anesthetic complications: no  Cardiovascular status: hemodynamically stable  Respiratory status: acceptable  Hydration status: euvolemic    PONV: none          No complications documented.     Nurse Pain Score: 0 (NPRS)

## 2021-10-18 NOTE — OR NURSING
Pt allergies and NPO status verified, home meds reconcilled. Belongings secured. Pt verbalizes understanding of the pain scale, expected course of stay, and plan of care.  Procedure verified with pt.  IV access established.

## 2021-12-28 DIAGNOSIS — E78.5 DYSLIPIDEMIA: ICD-10-CM

## 2021-12-29 RX ORDER — ATORVASTATIN CALCIUM 20 MG/1
20 TABLET, FILM COATED ORAL DAILY
Qty: 30 TABLET | Refills: 6 | Status: SHIPPED | OUTPATIENT
Start: 2021-12-29 | End: 2022-07-19

## 2022-02-23 RX ORDER — VARENICLINE TARTRATE 1 MG/1
TABLET, FILM COATED ORAL
Qty: 60 TABLET | OUTPATIENT
Start: 2022-02-23

## 2022-02-25 RX ORDER — VARENICLINE TARTRATE 1 MG/1
TABLET, FILM COATED ORAL
Qty: 60 TABLET | OUTPATIENT
Start: 2022-02-25

## 2022-03-02 ENCOUNTER — OFFICE VISIT (OUTPATIENT)
Dept: VASCULAR LAB | Facility: MEDICAL CENTER | Age: 57
End: 2022-03-02
Attending: INTERNAL MEDICINE
Payer: MEDICAID

## 2022-03-02 DIAGNOSIS — F17.200 SMOKING: ICD-10-CM

## 2022-03-02 DIAGNOSIS — K80.50 CHOLEDOCHOLITHIASIS: ICD-10-CM

## 2022-03-02 DIAGNOSIS — I10 HYPERTENSION, UNSPECIFIED TYPE: ICD-10-CM

## 2022-03-02 DIAGNOSIS — E11.9 TYPE 2 DIABETES MELLITUS WITHOUT COMPLICATION, WITHOUT LONG-TERM CURRENT USE OF INSULIN (HCC): ICD-10-CM

## 2022-03-02 DIAGNOSIS — E78.5 DYSLIPIDEMIA: ICD-10-CM

## 2022-03-02 PROCEDURE — 99214 OFFICE O/P EST MOD 30 MIN: CPT | Performed by: INTERNAL MEDICINE

## 2022-03-02 RX ORDER — VARENICLINE TARTRATE 1 MG/1
1 TABLET, FILM COATED ORAL 2 TIMES DAILY
Qty: 60 TABLET | Refills: 3 | Status: SHIPPED | OUTPATIENT
Start: 2022-03-02 | End: 2022-11-10

## 2022-03-02 NOTE — PROGRESS NOTES
VASCULAR MEDICINE CLINIC - Follow Up Visit  3-2-22    Leonard Christensen is a 56 y.o. male who presents today  for vascular f/u    This visit was conducted via zoom video call using secure and encrypted video conferencing technology due to covid-19 restrictions.   The patient was in a private location in the [facility].    The patient's identity was confirmed and verbal consent was obtained for this virtual visit.    Subjective        HPI:  Patient here for follow up of dyslipidemia, htn, and dm and smoking  He still has some intermittent abdominal pain  Is concerned his LFTs are up a bit on his most recent blood work  No weight loss - if anything up a few pounds  Very limited etoh  Tolerating atorvastatin   Had blood work   BP all <130/80 at [facility]  On lisinopril hct   A1c 6.1 by his report  Triglycerides around 220.  He does not know LDL  Is still smoking a few cigarettes a day  He found that in the past Chantix helped him quit.  He denies any affective issues with    DIET AND EXERCISE:  Weight Change:up a few pounds  Diet: decent dm diet  Exercise: sporadic irregular exercise        Objective        Objective:     There were no vitals filed for this visit.     Physical Exam  Constitutional:       General: He is not in acute distress.     Appearance: He is not diaphoretic.   HENT:      Head: Normocephalic and atraumatic.   Eyes:      General: No scleral icterus.     Conjunctiva/sclera: Conjunctivae normal.   Pulmonary:      Effort: Pulmonary effort is normal. No respiratory distress.   Skin:     Coloration: Skin is not pale.   Neurological:      Mental Status: He is alert and oriented to person, place, and time.      Cranial Nerves: No cranial nerve deficit.   Psychiatric:         Mood and Affect: Affect normal.         Judgment: Judgment normal.          DATA REVIEW    Lab Results   Component Value Date/Time    CHOLSTRLTOT 123 07/01/2020 06:35 AM    LDL 60 07/01/2020 06:35 AM    HDL 34 (A) 07/01/2020 06:35  AM    TRIGLYCERIDE 146 07/01/2020 06:35 AM       Lab Results   Component Value Date/Time    SODIUM 135 10/15/2021 02:08 PM    POTASSIUM 4.4 10/15/2021 02:08 PM    CHLORIDE 95 (L) 10/15/2021 02:08 PM    CO2 26 10/15/2021 02:08 PM    GLUCOSE 125 (H) 10/15/2021 02:08 PM    BUN 16 10/15/2021 02:08 PM    CREATININE 0.76 10/15/2021 02:08 PM    BUNCREATRAT 19 11/02/2016 09:13 AM     Lab Results   Component Value Date/Time    ALKPHOSPHAT 199 (H) 10/15/2021 02:08 PM    ASTSGOT 578 (H) 10/15/2021 02:08 PM    ALTSGPT 483 (H) 10/15/2021 02:08 PM    TBILIRUBIN 1.2 10/15/2021 02:08 PM       Lab Results   Component Value Date/Time    HBA1C 5.9 (H) 11/02/2016 09:13 AM    HBA1C 6.5 (H) 12/22/2014 11:25 AM       Lab Results   Component Value Date/Time    MALBCRT see below 12/22/2014 11:25 AM    MICROALBUR <0.5 12/22/2014 11:25 AM    MICRALB 18.1 07/29/2016 09:41 AM       Blood work from [facility] aug 2021:  Na 136, 4.8, ast 50, alt 50, GFR >60  a1c 6.3  Lipase 377  Total c 182, trig 288, hdc 48, ldl 56  Ck normal    Medical Decision Making:  Today's Assessment / Status / Plan:     1. Dyslipidemia  Comp Metabolic Panel    CBC WITHOUT DIFFERENTIAL    Lipid Profile   2. Hypertension, unspecified type  Comp Metabolic Panel    CBC WITHOUT DIFFERENTIAL    MICROALBUMIN CREAT RATIO URINE   3. Type 2 diabetes mellitus without complication, without long-term current use of insulin (HCC)  HEMOGLOBIN A1C    MICROALBUMIN CREAT RATIO URINE   4. Smoking  varenicline (CHANTIX) 1 MG tablet   5. Choledocholithiasis  Comp Metabolic Panel    LIPASE    GAMMA GT (GGT)    Comp Metabolic Panel      Patient Type: Primary Prevention    Etiology of Established CVD if Present: None, but does have 5 of 5 components of metabolic syndrome at baseline    Lipid Management: Qualifies for Statin Therapy Based on 2018 ACC/AHA Guidelines: yes  Calculated 10-Year Risk of ASCVD: N/A  Currently on Statin: Yes  LDL previously under excellent control, but trigs/nonHDL remains a  bit high  Awaiting current blood work from [facility]  -Continue atorvastatin at current dose for now  -Recheck fasting lipids in 6 months   -Consider intensification of therapy with addition of prescription strength omega-3 if non-HDL or triglycerides remain high    Blood Pressure Management:  Acc/aha (2017) Blood Pressure Goal <130/80  Blood pressure under reasonable control based at home  -Continue lisinopril HCT  -Continue home blood pressure monitoring    Glycemic Status: Diabetic  A1c goal less than 7 at least  Most recent A1c shows reasonable control  -Continue Metformin  -Continue lifestyle modification  -Recheck A1c in 6 months    Anti-Platelet/Anti-Coagulant Tx: no  Not necessarily indicated at present    Smoking: Former smoker with history of recidivism   - continue complete abstinence from all tobacco products  -Restart Chantix.  We talked with the fact that it can increase risk of affective disorder.  He should call me if he has any changes in mood    Physical Activity: Encourage more walking    Weight Management and Nutrition: Dietary plan was discussed with patient at this visit including continue good diabetic diet    Other:    1.  Choledocholithiasis -advised patient report he is also concerned his told him this lies outside the realm of expertise.  I encouraged him to follow-up with GI his PCP.  I did order a repeat, lipase, GGT for him to get done 3 weeks and send to those providers    Instructed to follow-up with PCP for remainder of adult medical needs: yes  We will partner with other providers in the management of established vascular disease and cardiometabolic risk factors.    Studies to Be Obtained: Per GI  Labs to Be Obtained:   1) CMP, GGT, lipase in a few weeks  2) CMP, lipid panel, A1c, urine for albumin in 6 months    Follow up in: 6 months    Time: 30-39min - chart review/prep, review of other providers' records, imaging/lab review, face-to-face time for history/examination,  ordering, prescribing,  review of results/meds/ treatment plan with patient/family/caregiver, documentation in EMR, care coordination (as needed)    Michael J Bloch, M.D.     Cc:    Augie Owens

## 2022-03-28 ENCOUNTER — APPOINTMENT (OUTPATIENT)
Dept: URGENT CARE | Facility: CLINIC | Age: 57
End: 2022-03-28
Payer: MEDICAID

## 2022-06-24 DIAGNOSIS — I10 HYPERTENSION, UNSPECIFIED TYPE: ICD-10-CM

## 2022-06-27 RX ORDER — LISINOPRIL AND HYDROCHLOROTHIAZIDE 12.5; 1 MG/1; MG/1
TABLET ORAL
Qty: 90 TABLET | Refills: 3 | Status: SHIPPED | OUTPATIENT
Start: 2022-06-27 | End: 2022-08-10

## 2022-08-10 ENCOUNTER — OFFICE VISIT (OUTPATIENT)
Dept: VASCULAR LAB | Facility: MEDICAL CENTER | Age: 57
End: 2022-08-10
Attending: INTERNAL MEDICINE
Payer: MEDICAID

## 2022-08-10 DIAGNOSIS — E78.5 DYSLIPIDEMIA: ICD-10-CM

## 2022-08-10 DIAGNOSIS — F17.200 SMOKING: ICD-10-CM

## 2022-08-10 DIAGNOSIS — K80.50 CHOLEDOCHOLITHIASIS: ICD-10-CM

## 2022-08-10 DIAGNOSIS — I10 HYPERTENSION, UNSPECIFIED TYPE: ICD-10-CM

## 2022-08-10 DIAGNOSIS — E11.9 TYPE 2 DIABETES MELLITUS WITHOUT COMPLICATION, WITHOUT LONG-TERM CURRENT USE OF INSULIN (HCC): ICD-10-CM

## 2022-08-10 PROCEDURE — 99214 OFFICE O/P EST MOD 30 MIN: CPT | Performed by: INTERNAL MEDICINE

## 2022-08-10 RX ORDER — ICOSAPENT ETHYL 1000 MG/1
2 CAPSULE ORAL 2 TIMES DAILY
Qty: 120 CAPSULE | Refills: 11 | Status: SHIPPED | OUTPATIENT
Start: 2022-08-10 | End: 2022-12-16

## 2022-08-10 RX ORDER — MONTELUKAST SODIUM 4 MG/1
1 TABLET, CHEWABLE ORAL 2 TIMES DAILY
Qty: 60 TABLET | Refills: 11
Start: 2022-08-10 | End: 2023-09-12

## 2022-08-10 RX ORDER — PANCRELIPASE 15000; 3000; 9500 [USP'U]/1; [USP'U]/1; [USP'U]/1
3 CAPSULE, DELAYED RELEASE ORAL
Qty: 900 CAPSULE | Refills: 90
Start: 2022-08-10 | End: 2023-09-12

## 2022-08-10 RX ORDER — LISINOPRIL 10 MG/1
10 TABLET ORAL DAILY
Qty: 30 TABLET | Refills: 11 | Status: SHIPPED | OUTPATIENT
Start: 2022-08-10 | End: 2022-12-16

## 2022-08-10 NOTE — PROGRESS NOTES
VASCULAR MEDICINE CLINIC - Follow Up Visit  08/10/22    Leonard Christensen is a 56 y.o. male who presents today  for vascular f/u    This visit was conducted via zoom video call using secure and encrypted video conferencing technology due to covid-19 restrictions.   The patient was in a private location (home) in the [facility].    The patient's identity was confirmed and verbal consent was obtained for this virtual visit.    Subjective     HPI:  Patient here for follow up of dyslipidemia, htn, and dm and smoking  He still has some intermittent abdominal pain and diarrhea  Down only about 5-6 pounds  Scheduled for 3rd ERCP  Very limited etoh  Has been smoking a bit less than a pack per day.  Restarted varenicline last week  Tolerating atorvastatin   Had blood work   BP at home is all 120s/70s or below, some as low as 100/60 or even lower than that  Has had some lightheadedness  Had a fall after being lightheaded  On lisinopril hct       DIET AND EXERCISE:  Weight Change: down 5-6 pounds  Diet: decent dm diet  Exercise: sporadic irregular exercise        Objective        Objective:     There were no vitals filed for this visit.     Physical Exam  Constitutional:       General: He is not in acute distress.     Appearance: He is not diaphoretic.   HENT:      Head: Normocephalic and atraumatic.   Eyes:      General: No scleral icterus.     Conjunctiva/sclera: Conjunctivae normal.   Pulmonary:      Effort: Pulmonary effort is normal. No respiratory distress.   Skin:     Coloration: Skin is not pale.   Neurological:      General: No focal deficit present.      Mental Status: He is alert and oriented to person, place, and time.      Cranial Nerves: No cranial nerve deficit.   Psychiatric:         Mood and Affect: Mood and affect normal.         Behavior: Behavior normal.        DATA REVIEW    Lab Results   Component Value Date/Time    CHOLSTRLTOT 123 07/01/2020 06:35 AM    LDL 60 07/01/2020 06:35 AM    HDL 34 (A)  07/01/2020 06:35 AM    TRIGLYCERIDE 146 07/01/2020 06:35 AM       Lab Results   Component Value Date/Time    SODIUM 135 10/15/2021 02:08 PM    POTASSIUM 4.4 10/15/2021 02:08 PM    CHLORIDE 95 (L) 10/15/2021 02:08 PM    CO2 26 10/15/2021 02:08 PM    GLUCOSE 125 (H) 10/15/2021 02:08 PM    BUN 16 10/15/2021 02:08 PM    CREATININE 0.76 10/15/2021 02:08 PM    BUNCREATRAT 19 11/02/2016 09:13 AM     Lab Results   Component Value Date/Time    ALKPHOSPHAT 199 (H) 10/15/2021 02:08 PM    ASTSGOT 578 (H) 10/15/2021 02:08 PM    ALTSGPT 483 (H) 10/15/2021 02:08 PM    TBILIRUBIN 1.2 10/15/2021 02:08 PM       Lab Results   Component Value Date/Time    HBA1C 5.9 (H) 11/02/2016 09:13 AM    HBA1C 6.5 (H) 12/22/2014 11:25 AM       Lab Results   Component Value Date/Time    MALBCRT see below 12/22/2014 11:25 AM    MICROALBUR <0.5 12/22/2014 11:25 AM    MICRALB 18.1 07/29/2016 09:41 AM       Blood work from [facility] aug 2021:  Na 136, 4.8, ast 50, alt 50, GFR >60  a1c 6.3  Lipase 377  Total c 182, trig 288, hdc 48, ldl 56  Ck normal    Blood work from [facility] August 2022  Hemoglobin 14.9, WBC 6.6  Sodium 136, potassium 4.0, glucose 131, GFR greater than 60, AST 38, ALT 39, alk phos 58  A1c 6.3  Total cholesterol 146, triglycerides 507, HDL 35  Albumin creatinine ratio 5.6    Medical Decision Making:  Today's Assessment / Status / Plan:     1. Dyslipidemia  Icosapent Ethyl 1 g Cap    colestipol (COLESTID) 1 GM Tab    Lipid Profile    LDL, DIRECT      2. Hypertension, unspecified type  lisinopril (PRINIVIL) 10 MG Tab    Lipid Profile    Comp Metabolic Panel      3. Type 2 diabetes mellitus without complication, without long-term current use of insulin (HCC)  SITagliptin (JANUVIA) 100 MG Tab    CBC WITHOUT DIFFERENTIAL    HEMOGLOBIN A1C      4. Smoking        5. Choledocholithiasis  pancrelipase, Lip-Prot-Amyl, (CREON) 7474-7152 units Cap DR Particles         Patient Type: Primary Prevention    Etiology of Established CVD if Present:  None, but does have 5 of 5 components of metabolic syndrome at baseline    Lipid Management: Qualifies for Statin Therapy Based on 2018 ACC/AHA Guidelines: yes  Calculated 10-Year Risk of ASCVD: N/A  Currently on Statin: Yes  LDL previously under excellent control, but trigs/nonHDL remains a bit high -I have increased risk of pancreatitis  -Continue atorvastatin at current dose  -add vascepa 2 g twice daily  -Recheck fasting lipids and direct LDL prior to next visit    Blood Pressure Management:  Acc/aha (2017) Blood Pressure Goal <130/80  Blood pressure seems overtreated patient is developed symptomatic hypotension  No albuminuria  -Change lisinopril HCT to lisinopril 10 mg daily  -Continue home blood pressure monitoring  -Call if continues to have lightheadedness    Glycemic Status: Diabetic  A1c goal less than 7 at least  Most recent A1c shows reasonable control  Metformin can sometimes cause GI side effects  -Trial of changing metformin to Januvia 100 mg daily  -Continue lifestyle modification  -Recheck A1c prior to next visit    Anti-Platelet/Anti-Coagulant Tx: no  Not necessarily indicated at present    Smoking: Former smoker with history of recidivism   -Recommended complete abstinence from all tobacco products and setting a quit date  -Continue Chantix.  Call with any changes living    Physical Activity: Encourage more walking    Weight Management and Nutrition: Per GI    Other:    1.  Choledocholithiasis and continued GI issues -defer further work-up and management to GI    Instructed to follow-up with PCP for remainder of adult medical needs: yes  We will partner with other providers in the management of established vascular disease and cardiometabolic risk factors.    Studies to Be Obtained: Per GI  Labs to Be Obtained: Fasting for panel, CMP, A1c prior to next visit    Follow up in: 3 months    Time: 30-39min - chart review/prep, review of other providers' records, imaging/lab review, face-to-face time  for history/examination, ordering, prescribing,  review of results/meds/ treatment plan with patient/family/caregiver, documentation in EMR, care coordination (as needed)    Michael J Bloch, M.D.     Cc:    Augie Owens

## 2022-08-11 ENCOUNTER — DOCUMENTATION (OUTPATIENT)
Dept: VASCULAR LAB | Facility: MEDICAL CENTER | Age: 57
End: 2022-08-11
Payer: MEDICAID

## 2022-08-11 NOTE — PROGRESS NOTES
Jackson JUÁREZ Submission     Icosapent Ethyl 1 GM capsules   KEY: [id number]   PA Case ID: [id number]    Staus: Pending

## 2022-08-11 NOTE — PROGRESS NOTES
I have contacted the patient and left a voicemail at the preferred number on file. The patient has the option to fill with [facility]. [facility] offer free delivery to the patient’s preferred address.

## 2022-08-15 ENCOUNTER — DOCUMENTATION (OUTPATIENT)
Dept: VASCULAR LAB | Facility: MEDICAL CENTER | Age: 57
End: 2022-08-15
Payer: MEDICAID

## 2022-08-15 NOTE — PROGRESS NOTES
Received documentation from abebe that Vascepa PA was denied due to inadequate documentation  Will forward to PA specialists for further investigation    Michael Bloch, MD  Vascular Medicine

## 2022-11-10 DIAGNOSIS — F17.200 SMOKING: ICD-10-CM

## 2022-11-10 RX ORDER — VARENICLINE TARTRATE 1 MG/1
TABLET, FILM COATED ORAL
Qty: 56 TABLET | Refills: 4 | Status: SHIPPED | OUTPATIENT
Start: 2022-11-10 | End: 2023-04-04

## 2022-12-12 DIAGNOSIS — E11.9 TYPE 2 DIABETES MELLITUS WITHOUT COMPLICATION, WITHOUT LONG-TERM CURRENT USE OF INSULIN (HCC): ICD-10-CM

## 2022-12-12 RX ORDER — SITAGLIPTIN 100 MG/1
TABLET, FILM COATED ORAL
Qty: 30 TABLET | Refills: 3 | Status: SHIPPED | OUTPATIENT
Start: 2022-12-12 | End: 2023-04-12

## 2022-12-16 ENCOUNTER — OFFICE VISIT (OUTPATIENT)
Dept: VASCULAR LAB | Facility: MEDICAL CENTER | Age: 57
End: 2022-12-16
Attending: INTERNAL MEDICINE
Payer: MEDICAID

## 2022-12-16 ENCOUNTER — TELEPHONE (OUTPATIENT)
Dept: VASCULAR LAB | Facility: MEDICAL CENTER | Age: 57
End: 2022-12-16
Payer: MEDICAID

## 2022-12-16 DIAGNOSIS — E11.9 TYPE 2 DIABETES MELLITUS WITHOUT COMPLICATION, WITHOUT LONG-TERM CURRENT USE OF INSULIN (HCC): ICD-10-CM

## 2022-12-16 DIAGNOSIS — I10 HYPERTENSION, UNSPECIFIED TYPE: ICD-10-CM

## 2022-12-16 DIAGNOSIS — F17.200 SMOKING: ICD-10-CM

## 2022-12-16 DIAGNOSIS — E78.5 DYSLIPIDEMIA: ICD-10-CM

## 2022-12-16 PROCEDURE — 99214 OFFICE O/P EST MOD 30 MIN: CPT | Performed by: INTERNAL MEDICINE

## 2022-12-16 RX ORDER — LISINOPRIL AND HYDROCHLOROTHIAZIDE 20; 12.5 MG/1; MG/1
1 TABLET ORAL DAILY
Qty: 30 TABLET | Refills: 11 | Status: SHIPPED | OUTPATIENT
Start: 2022-12-16 | End: 2023-04-04

## 2022-12-16 RX ORDER — FENOFIBRATE 145 MG/1
145 TABLET, COATED ORAL DAILY
Qty: 30 TABLET | Refills: 11
Start: 2022-12-16

## 2022-12-16 RX ORDER — ATORVASTATIN CALCIUM 40 MG/1
40 TABLET, FILM COATED ORAL DAILY
Qty: 30 TABLET | Refills: 11
Start: 2022-12-16

## 2022-12-16 NOTE — TELEPHONE ENCOUNTER
Per pt , lab work done at [facility]     Records requested STAT     Fax#[phone]    Confirmation Recieved   Scanned to pt chart   Pending results

## 2022-12-16 NOTE — PROGRESS NOTES
VASCULAR MEDICINE CLINIC - Follow Up Visit  12/16/22      Leonard Christensen is a 56 y.o. male who presents today  for vascular f/u    This visit was conducted via zoom video call using secure and encrypted video conferencing technology due to covid-19 restrictions.   The patient was in a private location (home) in the [facility].    The patient's identity was confirmed and verbal consent was obtained for this virtual visit.    Subjective     HPI:  Patient here for follow up of dyslipidemia, htn, and dm and smoking  He still has some intermittent abdominal pain and diarrhea  Weight is actually up a bit  Patient says he really has not been feeling well.  He denies chest pain or shortness of breath.  No leg swelling.  No TIA or stroke symptoms.  No palpitations.  No myalgias.  Any fevers or cough.  He says that he really just is fatigued and lacks any motivation.  He has been following up monthly with his PCP.  They are trying to adjust some of his medications.  States very limited etoh  Has been smoking a bit less than a pack per day.  Restarted varenicline last week  Tolerating atorvastatin   Had blood work at [facility]  Blood pressures been mostly 130s to 140s over 70s to 80s at home  Remains on lisinopril monotherapy  No further lightheadedness  Was unable to get Vascepa due to cost  PCP started fenofibrate.  His symptoms predate being on this drug and he feels no different on it      DIET AND EXERCISE:  Weight Change: Up a bit  Diet: Common adult  Exercise: sporadic irregular exercise        Objective        Objective:     There were no vitals filed for this visit.     Physical Exam  Constitutional:       General: He is not in acute distress.     Appearance: He is not diaphoretic.   HENT:      Head: Normocephalic and atraumatic.   Eyes:      General: No scleral icterus.     Conjunctiva/sclera: Conjunctivae normal.   Pulmonary:      Effort: Pulmonary effort is normal. No respiratory distress.   Skin:      Coloration: Skin is not pale.   Neurological:      General: No focal deficit present.      Mental Status: He is alert and oriented to person, place, and time.      Cranial Nerves: No cranial nerve deficit.   Psychiatric:         Mood and Affect: Mood and affect normal.         Behavior: Behavior normal.        DATA REVIEW    Lab Results   Component Value Date/Time    CHOLSTRLTOT 123 07/01/2020 06:35 AM    LDL 60 07/01/2020 06:35 AM    HDL 34 (A) 07/01/2020 06:35 AM    TRIGLYCERIDE 146 07/01/2020 06:35 AM       Lab Results   Component Value Date/Time    SODIUM 135 (L) 03/28/2022 12:32 PM    SODIUM 135 10/15/2021 02:08 PM    POTASSIUM 4.0 03/28/2022 12:32 PM    POTASSIUM 4.4 10/15/2021 02:08 PM    CHLORIDE 100 03/28/2022 12:32 PM    CHLORIDE 95 (L) 10/15/2021 02:08 PM    CO2 26.0 03/28/2022 12:32 PM    CO2 26 10/15/2021 02:08 PM    GLUCOSE 115 03/28/2022 12:32 PM    GLUCOSE 125 (H) 10/15/2021 02:08 PM    BUN 17.0 03/28/2022 12:32 PM    BUN 16 10/15/2021 02:08 PM    CREATININE 1.0 03/28/2022 12:32 PM    CREATININE 0.76 10/15/2021 02:08 PM    BUNCREATRAT 17.0 03/28/2022 12:32 PM    BUNCREATRAT 19 11/02/2016 09:13 AM     Lab Results   Component Value Date/Time    ALKPHOSPHAT 199 (H) 10/15/2021 02:08 PM    ASTSGOT 578 (H) 10/15/2021 02:08 PM    ALTSGPT 483 (H) 10/15/2021 02:08 PM    TBILIRUBIN 1.2 10/15/2021 02:08 PM       Lab Results   Component Value Date/Time    HBA1C 5.9 (H) 11/02/2016 09:13 AM    HBA1C 6.5 (H) 12/22/2014 11:25 AM       Lab Results   Component Value Date/Time    MALBCRT see below 12/22/2014 11:25 AM    MICROALBUR <0.5 12/22/2014 11:25 AM    MICRALB 18.1 07/29/2016 09:41 AM       Blood work from [facility] aug 2021:  Na 136, 4.8, ast 50, alt 50, GFR >60  a1c 6.3  Lipase 377  Total c 182, trig 288, hdc 48, ldl 56  Ck normal    Blood work from [facility] August 2022  Hemoglobin 14.9, WBC 6.6  Sodium 136, potassium 4.0, glucose 131, GFR greater than 60, AST 38, ALT 39, alk phos 58  A1c 6.3  Total  cholesterol 146, triglycerides 507, HDL 35  Albumin creatinine ratio 5.6    Medical Decision Making:  Today's Assessment / Status / Plan:     1. Type 2 diabetes mellitus without complication, without long-term current use of insulin (HCC)  HEMOGLOBIN A1C    MICROALBUMIN CREAT RATIO URINE      2. Smoking        3. Dyslipidemia  atorvastatin (LIPITOR) 40 MG Tab    fenofibrate (TRICOR) 145 MG Tab    Comp Metabolic Panel    Lipid Profile    TSH    CREATINE KINASE    LDL, DIRECT      4. Hypertension, unspecified type  lisinopril-hydrochlorothiazide (PRINZIDE) 20-12.5 MG per tablet    MICROALBUMIN CREAT RATIO URINE         Patient Type: Primary Prevention    Etiology of Established CVD if Present: None, but does have 5 of 5 components of metabolic syndrome at baseline    Lipid Management: Qualifies for Statin Therapy Based on 2018 ACC/AHA Guidelines: yes  Calculated 10-Year Risk of ASCVD: N/A  Currently on Statin: Yes  LDL previously under excellent control, but trigs/nonHDL remains a bit high, but improved from previous  Unable to obtain prescription strength omega-3 due to formulary issues  No myalgias  I continue to think the benefit of combination therapy outweighs the risk as long as he is tolerating it  Plan:  -Continue atorvastatin 40 mg daily  -Continue fenofibrate as recommended by PCP  -Recheck fasting lipids with direct LDL and CK prior to next visit    Blood Pressure Management:  Acc/aha (2017) Blood Pressure Goal <130/80  Blood pressure is elevated at home  No albuminuria  Plan:  -Change lisinopril to lisinopril HCT 20/12.5 mg daily  -Continue home blood pressure monitoring  -Call if has any further lightheadedness  -Recheck GFR-and electrolytes prior to next visit    Glycemic Status: Diabetic  A1c goal less than 7 at least  Most recent A1c shows reasonable control  No difference in GI side effects since changing from metformin to Januvia  Plan:  -Continue Januvia 100 mg daily  -Continue lifestyle  modification  -Recheck A1c prior to next visit    Anti-Platelet/Anti-Coagulant Tx: no  Not necessarily indicated at present    Smoking: Former smoker with history of recidivism   -Recommended complete abstinence from all tobacco products and setting a quit date  -Continue Chantix.  Call with any changes living    Physical Activity: Encourage more walking    Weight Management and Nutrition: Per GI    Other:    1.  Choledocholithiasis and continued GI issues -defer further work-up and management to GI    2.  Possible depression -as I discussed with patient the work-up of his noncardiovascular symptoms is outside my realm of expertise.  That being said it does sound most consistent with a diagnosis of depression.  I have encouraged him to discuss this with his PCP who he will be following up with on a monthly basis.  He did deny suicidal ideation during our discussion    Instructed to follow-up with PCP for remainder of adult medical needs: yes  We will partner with other providers in the management of established vascular disease and cardiometabolic risk factors.    Studies to Be Obtained: Per GI  Labs to Be Obtained: As above prior to next visit    Follow up in: 3 months in the office    Time: 30-39min - chart review/prep, review of other providers' records, imaging/lab review, face-to-face time for history/examination, ordering, prescribing,  review of results/meds/ treatment plan with patient/family/caregiver, documentation in EMR, care coordination (as needed)    Michael J Bloch, M.D.     Cc:    Augie Owens

## 2022-12-17 NOTE — PROGRESS NOTES
Pt's suction drain to pelvis emptied and drain irrigated with 20ml steril NS per drain care order. Pt medicated for pain. Pt went up to the bathroom voided and had a small BM and now back to bed.   Patient-parent interaction appropriate negative

## 2023-03-30 LAB — HBA1C MFR BLD: 6.3 % (ref ?–5.8)

## 2023-04-04 ENCOUNTER — OFFICE VISIT (OUTPATIENT)
Dept: VASCULAR LAB | Facility: MEDICAL CENTER | Age: 58
End: 2023-04-04
Attending: INTERNAL MEDICINE
Payer: MEDICAID

## 2023-04-04 VITALS
HEART RATE: 93 BPM | WEIGHT: 173 LBS | SYSTOLIC BLOOD PRESSURE: 113 MMHG | HEIGHT: 72 IN | BODY MASS INDEX: 23.43 KG/M2 | DIASTOLIC BLOOD PRESSURE: 73 MMHG

## 2023-04-04 DIAGNOSIS — E78.5 DYSLIPIDEMIA: ICD-10-CM

## 2023-04-04 DIAGNOSIS — I10 HYPERTENSION, UNSPECIFIED TYPE: ICD-10-CM

## 2023-04-04 DIAGNOSIS — F17.200 SMOKING: ICD-10-CM

## 2023-04-04 DIAGNOSIS — E11.9 TYPE 2 DIABETES MELLITUS WITHOUT COMPLICATION, WITHOUT LONG-TERM CURRENT USE OF INSULIN (HCC): ICD-10-CM

## 2023-04-04 PROCEDURE — 99212 OFFICE O/P EST SF 10 MIN: CPT

## 2023-04-04 PROCEDURE — 99214 OFFICE O/P EST MOD 30 MIN: CPT | Performed by: INTERNAL MEDICINE

## 2023-04-04 RX ORDER — LOSARTAN POTASSIUM 50 MG/1
50 TABLET ORAL DAILY
Qty: 30 TABLET | Refills: 11 | Status: SHIPPED | OUTPATIENT
Start: 2023-04-04 | End: 2023-08-31

## 2023-04-04 RX ORDER — VARENICLINE TARTRATE 1 MG/1
1 TABLET, FILM COATED ORAL 2 TIMES DAILY
Qty: 60 TABLET | Refills: 11 | Status: SHIPPED | OUTPATIENT
Start: 2023-04-04

## 2023-04-04 ASSESSMENT — FIBROSIS 4 INDEX: FIB4 SCORE: 8.48

## 2023-04-04 NOTE — PROGRESS NOTES
VASCULAR MEDICINE CLINIC - Follow Up Visit  04/04/23    Leonard Christensen is a 57 y.o. male who presents today  for vascular f/u    Subjective     HPI:  Patient here for follow up of dyslipidemia, htn, and dm and smoking  He still has some intermittent abdominal pain and diarrhea  No significant weight loss  Not seen GI  Still with a lot of back pain  Does not feel well and has lack of motivation  He continues to follow-up monthly with his PCP  He has been smoking greater than a pack a day  He has not been taking Chantix recently because he had trouble getting it refilled but he has tolerated well in the past  Tolerating atorvastatin without myalgias  Remains on fenofibrate  Had blood work at [facility]  Blood pressures been mostly less than 120  He has had lightheadedness and a few falls  Denies syncope  Current lisinopril hydrochlorothiazide  Has a cough    DIET AND EXERCISE:  Weight Change: Up a bit  Diet: Common adult  Exercise: sporadic irregular exercise        Objective        Objective:     Vitals:    04/04/23 1046   BP: 113/73   BP Location: Left arm   Patient Position: Sitting   BP Cuff Size: Adult   Pulse: 93   Weight: 78.5 kg (173 lb)   Height: 1.829 m (6')        Physical Exam  Vitals reviewed.   Constitutional:       General: He is not in acute distress.     Appearance: He is not diaphoretic.   HENT:      Head: Normocephalic and atraumatic.   Eyes:      General: No scleral icterus.     Conjunctiva/sclera: Conjunctivae normal.   Neck:      Vascular: No carotid bruit.   Cardiovascular:      Rate and Rhythm: Normal rate and regular rhythm.      Heart sounds: Normal heart sounds. No murmur heard.  Pulmonary:      Effort: Pulmonary effort is normal. No respiratory distress.      Breath sounds: Normal breath sounds. No wheezing or rales.   Musculoskeletal:      Right lower leg: No edema.      Left lower leg: No edema.   Skin:     Coloration: Skin is not pale.   Neurological:      General: No focal  deficit present.      Mental Status: He is alert and oriented to person, place, and time.      Cranial Nerves: No cranial nerve deficit.      Coordination: Coordination normal.      Gait: Gait is intact. Gait normal.   Psychiatric:         Mood and Affect: Mood and affect normal.         Behavior: Behavior normal.        DATA REVIEW    Lab Results   Component Value Date/Time    CHOLSTRLTOT 123 07/01/2020 06:35 AM    LDL 60 07/01/2020 06:35 AM    HDL 34 (A) 07/01/2020 06:35 AM    TRIGLYCERIDE 146 07/01/2020 06:35 AM       Lab Results   Component Value Date/Time    SODIUM 135 (L) 03/28/2022 12:32 PM    SODIUM 135 10/15/2021 02:08 PM    POTASSIUM 4.0 03/28/2022 12:32 PM    POTASSIUM 4.4 10/15/2021 02:08 PM    CHLORIDE 100 03/28/2022 12:32 PM    CHLORIDE 95 (L) 10/15/2021 02:08 PM    CO2 26.0 03/28/2022 12:32 PM    CO2 26 10/15/2021 02:08 PM    GLUCOSE 115 03/28/2022 12:32 PM    GLUCOSE 125 (H) 10/15/2021 02:08 PM    BUN 17.0 03/28/2022 12:32 PM    BUN 16 10/15/2021 02:08 PM    CREATININE 1.0 03/28/2022 12:32 PM    CREATININE 0.76 10/15/2021 02:08 PM    BUNCREATRAT 17.0 03/28/2022 12:32 PM    BUNCREATRAT 19 11/02/2016 09:13 AM     Lab Results   Component Value Date/Time    ALKPHOSPHAT 199 (H) 10/15/2021 02:08 PM    ASTSGOT 578 (H) 10/15/2021 02:08 PM    ALTSGPT 483 (H) 10/15/2021 02:08 PM    TBILIRUBIN 1.2 10/15/2021 02:08 PM       Lab Results   Component Value Date/Time    HBA1C 5.9 (H) 11/02/2016 09:13 AM    HBA1C 6.5 (H) 12/22/2014 11:25 AM       Lab Results   Component Value Date/Time    MALBCRT see below 12/22/2014 11:25 AM    MICROALBUR <0.5 12/22/2014 11:25 AM    MICRALB 18.1 07/29/2016 09:41 AM       Blood work from [facility] aug 2021:  Na 136, 4.8, ast 50, alt 50, GFR >60  a1c 6.3  Lipase 377  Total c 182, trig 288, hdc 48, ldl 56  Ck normal    Blood work from [facility] August 2022  Hemoglobin 14.9, WBC 6.6  Sodium 136, potassium 4.0, glucose 131, GFR greater than 60, AST 38, ALT 39, alk phos 58  A1c  6.3  Total cholesterol 146, triglycerides 507, HDL 35  Albumin creatinine ratio 5.6    Blood work from [facility] December 2022  Sodium 136, potassium 4.1, glucose 183, creatinine 0.99, GFR 88  A1c 6.8  Total cholesterol 258, triglycerides 544, HDL 61  LDL direct 133    Blood work from [facility] March 2023  WBC 6.0, hemoglobin 13.1, platelet 292  Sodium 137, potassium 4.8, glucose 127, AST 40, ALT 29  BUN 31, creatinine 1.69, GFR 46  A1c 6.3  LDL 66, HDL 60, triglycerides 332, cholesterol 193  CK 62  Albumin creatinine ratio normal    Medical Decision Making:  Today's Assessment / Status / Plan:     1. Type 2 diabetes mellitus without complication, without long-term current use of insulin ([facility])        2. Smoking  varenicline (CHANTIX) 1 MG tablet      3. Dyslipidemia        4. Hypertension, unspecified type  losartan (COZAAR) 50 MG Tab         Patient Type: Primary Prevention    Etiology of Established CVD if Present: None, but does have 5 of 5 components of metabolic syndrome at baseline    Lipid Management: Qualifies for Statin Therapy Based on 2018 ACC/AHA Guidelines: yes  Calculated 10-Year Risk of ASCVD: N/A  Currently on Statin: Yes  LDL under excellent control  trigs/nonHDL remains a bit high, but improved from previous  Unable to obtain prescription strength omega-3 due to formulary issues  No myalgias and CK normal  I continue to think the benefit of combination therapy outweighs the risk as long as he is tolerating it  Plan:  -Continue atorvastatin 40 mg daily  -Continue fenofibrate as recommended by PCP  -Recheck fasting lipids with direct LDL and CK in future    Blood Pressure Management:  Acc/aha (2017) Blood Pressure Goal <130/80  BP appears overtreated  No albuminuria  Possible ACE inhibitor cough  Plan:  -Change lisinopril HCT to losartan 50 mg daily  -Continue home blood pressure monitoring  -Call if has any further lightheadedness  -Recheck GFR-and electrolytes in the future    Glycemic  Status: Diabetic  A1c goal less than 7 at least  Most recent A1c shows reasonable control  No difference in GI side effects since changing from metformin to Januvia  Plan:  -Continue Januvia 100 mg daily  -Continue lifestyle modification  -Recheck A1c in the future    Anti-Platelet/Anti-Coagulant Tx: no  Not necessarily indicated at present    Smoking: Current everyday smoker  He did fine Chantix helpful in the past and had no side effects from it  -Recommended complete abstinence from all tobacco products and setting a quit date  -Restart Chantix.  Call with any side effects  Continue to address at each visit-  -Defer any indicated lung cancer screening to PCP    Physical Activity: Encourage more walking    Weight Management and Nutrition: Per GI    Other:    1.  Choledocholithiasis and continued GI issues -defer further work-up and management to GI.  Encourage patient to talk to his PCP about whether or not he should see a gastroenterologist    2.  Possible depression -as I again discussed with patient the work-up of his noncardiovascular symptoms is outside my realm of expertise.  That being said it does sound most consistent with a diagnosis of depression.  I have encouraged him to discuss this with his PCP who he will be following up with on a monthly basis.  He did deny suicidal ideation during our discussion    Instructed to follow-up with PCP for remainder of adult medical needs: yes  We will partner with other providers in the management of established vascular disease and cardiometabolic risk factors.    Studies to Be Obtained: Per GI  Labs to Be Obtained: None    Follow up in: 6 weeks to assess blood pressure control    Time: 35 min - chart review/prep, review of other providers' records, imaging/lab review, face-to-face time for history/examination, ordering, prescribing,  review of results/meds/ treatment plan with patient/family/caregiver, documentation in EMR, care coordination (as needed)    Rivas  J Bloch, M.D.     Cc:    Augie Owens

## 2023-04-12 DIAGNOSIS — E11.9 TYPE 2 DIABETES MELLITUS WITHOUT COMPLICATION, WITHOUT LONG-TERM CURRENT USE OF INSULIN (HCC): ICD-10-CM

## 2023-04-12 RX ORDER — SITAGLIPTIN 100 MG/1
TABLET, FILM COATED ORAL
Qty: 30 TABLET | Refills: 3 | Status: SHIPPED | OUTPATIENT
Start: 2023-04-12 | End: 2023-07-26

## 2023-05-31 ENCOUNTER — OFFICE VISIT (OUTPATIENT)
Dept: VASCULAR LAB | Facility: MEDICAL CENTER | Age: 58
End: 2023-05-31
Payer: MEDICAID

## 2023-05-31 VITALS
DIASTOLIC BLOOD PRESSURE: 74 MMHG | HEART RATE: 82 BPM | BODY MASS INDEX: 23.3 KG/M2 | HEIGHT: 72 IN | WEIGHT: 172 LBS | SYSTOLIC BLOOD PRESSURE: 133 MMHG

## 2023-05-31 DIAGNOSIS — E11.9 TYPE 2 DIABETES MELLITUS WITHOUT COMPLICATION, WITHOUT LONG-TERM CURRENT USE OF INSULIN (HCC): ICD-10-CM

## 2023-05-31 DIAGNOSIS — I10 HYPERTENSION, UNSPECIFIED TYPE: ICD-10-CM

## 2023-05-31 DIAGNOSIS — F17.200 SMOKING: ICD-10-CM

## 2023-05-31 DIAGNOSIS — E78.5 DYSLIPIDEMIA: ICD-10-CM

## 2023-05-31 DIAGNOSIS — R06.83 SNORING: ICD-10-CM

## 2023-05-31 PROCEDURE — 99212 OFFICE O/P EST SF 10 MIN: CPT

## 2023-05-31 PROCEDURE — 3075F SYST BP GE 130 - 139MM HG: CPT

## 2023-05-31 PROCEDURE — 3078F DIAST BP <80 MM HG: CPT

## 2023-05-31 PROCEDURE — 99214 OFFICE O/P EST MOD 30 MIN: CPT

## 2023-05-31 PROCEDURE — 99406 BEHAV CHNG SMOKING 3-10 MIN: CPT

## 2023-05-31 RX ORDER — POLYETHYLENE GLYCOL 3350 17 G
2 POWDER IN PACKET (EA) ORAL
Qty: 360 LOZENGE | Refills: 2 | Status: SHIPPED | OUTPATIENT
Start: 2023-05-31 | End: 2023-08-31

## 2023-05-31 RX ORDER — HYDROCHLOROTHIAZIDE 12.5 MG/1
12.5 CAPSULE, GELATIN COATED ORAL DAILY
Qty: 30 CAPSULE | Refills: 11 | Status: SHIPPED | OUTPATIENT
Start: 2023-05-31 | End: 2023-08-31

## 2023-05-31 ASSESSMENT — FIBROSIS 4 INDEX: FIB4 SCORE: 8.48

## 2023-05-31 NOTE — PROGRESS NOTES
VASCULAR MEDICINE CLINIC - Follow Up Visit  05/31/2023    Leonard Christensen is a 57 y.o. male who presents today  for vascular f/u    Subjective     HPI:  Patient here for follow up of dyslipidemia, htn, and dm and smoking  Doing well overall,  Is following up with pcp for a variety of issues, has appt next week  Tolerating change to losartan, cough seems better, does continue to cough more in evenings  Dizziness has improved, just occasional with rapid position changes  Home BPs reported to be mostly in 140s/80s on average, did not bring in log  On atorva and fenofibrate - tolerating, no myalgias  Denies HA/vision changes  Denies cv symptoms,   Denies TIA/CVA symptoms  Is using chantix, and toleratinge  Down to 2 cigarettes a day!    Requesting lozenges or gum to help  Still not sleeping well, gets 2-3 hrs/night - working with pcp  Significant other reports snoring and periods of apnea when he does sleep  Has not had sleep study done  Stays adequately hydrated    DIET AND EXERCISE:  Weight Change: stable  Diet: Common adult  Exercise: sporadic irregular exercise        Objective        Objective:     Vitals:    05/31/23 1054 05/31/23 1056   BP: (!) 145/75 133/74   BP Location: Left arm Left arm   Patient Position: Sitting Sitting   BP Cuff Size: Adult Adult   Pulse: 80 82   Weight: 78 kg (172 lb)    Height: 1.829 m (6')         Physical Exam  Vitals reviewed.   Constitutional:       General: He is not in acute distress.     Appearance: He is not diaphoretic.   HENT:      Head: Normocephalic and atraumatic.   Eyes:      General: No scleral icterus.  Cardiovascular:      Rate and Rhythm: Normal rate and regular rhythm.      Heart sounds: Normal heart sounds. No murmur heard.  Pulmonary:      Effort: Pulmonary effort is normal. No respiratory distress.      Breath sounds: Normal breath sounds. No wheezing or rales.   Musculoskeletal:      Right lower leg: No edema.      Left lower leg: No edema.   Skin:     General:  Skin is warm and dry.      Coloration: Skin is not pale.   Neurological:      General: No focal deficit present.      Mental Status: He is alert and oriented to person, place, and time.      Coordination: Coordination normal.      Gait: Gait is intact. Gait normal.   Psychiatric:         Mood and Affect: Mood and affect normal.         Behavior: Behavior normal.          DATA REVIEW    Lab Results   Component Value Date/Time    CHOLSTRLTOT 123 07/01/2020 06:35 AM    LDL 60 07/01/2020 06:35 AM    HDL 34 (A) 07/01/2020 06:35 AM    TRIGLYCERIDE 146 07/01/2020 06:35 AM       Lab Results   Component Value Date/Time    SODIUM 135 (L) 03/28/2022 12:32 PM    SODIUM 135 10/15/2021 02:08 PM    POTASSIUM 4.0 03/28/2022 12:32 PM    POTASSIUM 4.4 10/15/2021 02:08 PM    CHLORIDE 100 03/28/2022 12:32 PM    CHLORIDE 95 (L) 10/15/2021 02:08 PM    CO2 26.0 03/28/2022 12:32 PM    CO2 26 10/15/2021 02:08 PM    GLUCOSE 115 03/28/2022 12:32 PM    GLUCOSE 125 (H) 10/15/2021 02:08 PM    BUN 17.0 03/28/2022 12:32 PM    BUN 16 10/15/2021 02:08 PM    CREATININE 1.0 03/28/2022 12:32 PM    CREATININE 0.76 10/15/2021 02:08 PM    BUNCREATRAT 17.0 03/28/2022 12:32 PM    BUNCREATRAT 19 11/02/2016 09:13 AM     Lab Results   Component Value Date/Time    ALKPHOSPHAT 199 (H) 10/15/2021 02:08 PM    ASTSGOT 578 (H) 10/15/2021 02:08 PM    ALTSGPT 483 (H) 10/15/2021 02:08 PM    TBILIRUBIN 1.2 10/15/2021 02:08 PM       Lab Results   Component Value Date/Time    HBA1C 6.3 (A) 03/30/2023 12:00 AM       Lab Results   Component Value Date/Time    MALBCRT see below 12/22/2014 11:25 AM    MICROALBUR <0.5 12/22/2014 11:25 AM    MICRALB 18.1 07/29/2016 09:41 AM       Blood work from [facility] aug 2021:  Na 136, 4.8, ast 50, alt 50, GFR >60  a1c 6.3  Lipase 377  Total c 182, trig 288, hdc 48, ldl 56  Ck normal    Blood work from [facility] August 2022  Hemoglobin 14.9, WBC 6.6  Sodium 136, potassium 4.0, glucose 131, GFR greater than 60, AST 38, ALT 39, alk phos  58  A1c 6.3  Total cholesterol 146, triglycerides 507, HDL 35  Albumin creatinine ratio 5.6    Blood work from [facility] December 2022  Sodium 136, potassium 4.1, glucose 183, creatinine 0.99, GFR 88  A1c 6.8  Total cholesterol 258, triglycerides 544, HDL 61  LDL direct 133    Blood work from [facility] March 2023  WBC 6.0, hemoglobin 13.1, platelet 292  Sodium 137, potassium 4.8, glucose 127, AST 40, ALT 29  BUN 31, creatinine 1.69, GFR 46  A1c 6.3  LDL 66, HDL 60, triglycerides 332, cholesterol 193  CK 62  Albumin creatinine ratio normal    Medical Decision Making:  Today's Assessment / Status / Plan:     1. Smoking  nicotine polacrilex (NICOTINE MINI) 2 MG lozenge    Referral to Vascular Medicine      2. Snoring  Referral to Pulmonary and Sleep Medicine    Referral to Vascular Medicine      3. Hypertension, unspecified type  hydrochlorothiazide (MICROZIDE) 12.5 MG capsule    Basic Metabolic Panel    MICROALBUMIN CREAT RATIO URINE    Referral to Vascular Medicine      4. Type 2 diabetes mellitus without complication, without long-term current use of insulin ([facility])  Basic Metabolic Panel    HEMOGLOBIN A1C (Glycohemoglobin GHB Total/A1C with MBG Estimate)    MICROALBUMIN CREAT RATIO URINE    Referral to Vascular Medicine      5. Dyslipidemia  Basic Metabolic Panel    Lipid Profile    Referral to Vascular Medicine    LDL, DIRECT    CREATINE KINASE         Patient Type: Primary Prevention    Etiology of Established CVD if Present: None, but does have 5 of 5 components of metabolic syndrome at baseline    Lipid Management: Qualifies for Statin Therapy Based on 2018 ACC/AHA Guidelines: yes  Calculated 10-Year Risk of ASCVD: N/A  Currently on Statin: Yes  LDL under excellent control  trigs/nonHDL remains a bit high, but improved from previous  Unable to obtain prescription strength omega-3 due to formulary issues  No myalgias and CK normal  continue to think the benefit of combination therapy outweighs the risk  as long as he is tolerating it  Plan:  -Continue atorvastatin 40 mg daily  -Continue fenofibrate as recommended by PCP  -Recheck fasting lipids with direct LDL and CK in future    Blood Pressure Management:  Acc/aha (2017) Blood Pressure Goal <130/80  BP appears overtreated previously, now above goal at home and in office  No albuminuria  Possible ACE inhibitor cough - improved with switch to Losartan  Reviewed slow position changes to help with dizziness/lightheadedness.     Plan:  -continue losartan 50 mg daily - consider increase if remain above goal  - START HCTZ 12.5mg daily  - consider amlodipine as next agent if remains above goal  -Continue home blood pressure monitoring, take 3-4x's/week, BRING LOG INTO NEXT APPT  -Call if has any further lightheadedness  -Recheck GFR-and electrolytes in the future    Glycemic Status: Diabetic  A1c goal less than 7 at least  Most recent A1c shows reasonable control at 6.3 3/2023  No difference in GI side effects since changing from metformin to Januvia  Plan:  -Continue Januvia 100 mg daily  -Continue lifestyle modification  -Recheck A1c in the future    Anti-Platelet/Anti-Coagulant Tx: no  Not necessarily indicated at present    Smoking: Current everyday smoker  He did fine Chantix helpful in the past and had no side effects from it, using currently, doing well.    Down to 2 cigarettes/day - congratulated!!  -Recommended complete abstinence from all tobacco products and setting a quit date  -continue Chantix.  Call with any side effects  - start nicotine lozenges as needed to assist with cessation/cravings  - Continue to address at each visit  - 3 minutes of todays visit was spent discussing smoking cessation  -Defer any indicated lung cancer screening to PCP    Physical Activity: Encourage more walking    Weight Management and Nutrition: Per GI    Other:    1.  Choledocholithiasis and continued GI issues -defer further work-up and management to GI.  Encourage patient to  talk to his PCP about whether or not he should see a gastroenterologist    2.  Possible depression -as previously discussed with patient the work-up of his noncardiovascular symptoms is outside our realm of expertise.  That being said it does sound most consistent with a diagnosis of depression.  Again encouraged him to discuss this with his PCP who he will be following up with on a monthly basis.  He did deny suicidal ideation during our discussion    3.  KEVIN/Snoring - ongoing, has never had sleep study previously.  Pt wife reports pt snores and has apneic periods.  Pt has poor sleep, typically getting 2-3 hours per night per report, + daytime sleepiness, and fatigue.  May have difficulty with eval due to insurance.  Referral placed to sleep medicine.  Encourage pt to continue to follow up with PCP on sleep hygiene/insomnia treatment.        Instructed to follow-up with PCP for remainder of adult medical needs: yes  We will partner with other providers in the management of established vascular disease and cardiometabolic risk factors.    Studies to Be Obtained: Per GI  Labs to Be Obtained: None    Follow up in: 12 weeks to assess blood pressure control and review labs    Christal KHAN  [facility]      Cc:    Augie Owens

## 2023-08-31 ENCOUNTER — OFFICE VISIT (OUTPATIENT)
Dept: VASCULAR LAB | Facility: MEDICAL CENTER | Age: 58
End: 2023-08-31
Attending: NURSE PRACTITIONER
Payer: MEDICAID

## 2023-08-31 VITALS
SYSTOLIC BLOOD PRESSURE: 127 MMHG | WEIGHT: 167 LBS | BODY MASS INDEX: 22.62 KG/M2 | HEART RATE: 84 BPM | HEIGHT: 72 IN | DIASTOLIC BLOOD PRESSURE: 75 MMHG

## 2023-08-31 DIAGNOSIS — E88.810 METABOLIC SYNDROME: ICD-10-CM

## 2023-08-31 DIAGNOSIS — E11.9 TYPE 2 DIABETES MELLITUS WITHOUT COMPLICATION, WITHOUT LONG-TERM CURRENT USE OF INSULIN (HCC): ICD-10-CM

## 2023-08-31 DIAGNOSIS — E78.5 DYSLIPIDEMIA: ICD-10-CM

## 2023-08-31 PROCEDURE — 99212 OFFICE O/P EST SF 10 MIN: CPT

## 2023-08-31 PROCEDURE — 99214 OFFICE O/P EST MOD 30 MIN: CPT | Performed by: NURSE PRACTITIONER

## 2023-08-31 PROCEDURE — 3074F SYST BP LT 130 MM HG: CPT | Performed by: NURSE PRACTITIONER

## 2023-08-31 PROCEDURE — 3078F DIAST BP <80 MM HG: CPT | Performed by: NURSE PRACTITIONER

## 2023-08-31 RX ORDER — LISINOPRIL 20 MG/1
20 TABLET ORAL DAILY
COMMUNITY

## 2023-08-31 ASSESSMENT — ENCOUNTER SYMPTOMS
BRUISES/BLEEDS EASILY: 0
WHEEZING: 0
BLURRED VISION: 0
POLYDIPSIA: 1
COUGH: 0
ABDOMINAL PAIN: 0
NERVOUS/ANXIOUS: 0
WEAKNESS: 1
PALPITATIONS: 0
HEADACHES: 0
DIZZINESS: 0

## 2023-08-31 ASSESSMENT — FIBROSIS 4 INDEX: FIB4 SCORE: 8.62

## 2023-08-31 NOTE — PROGRESS NOTES
VASCULAR MEDICINE CLINIC - Follow Up Visit  08/31/2023    Leonard Christensen is a 58 y.o. male who presents today  for vascular f/u    Subjective     HPI:  Patient here for follow up of dyslipidemia, htn, and dm and smoking  Reports frequent urination and excessive thirst- over past 3 weeks  Glucose readings too high to be reported on his home monitor  Reports his recent A1C 10.7- labs requested from [facility]  Saw PCP a few days ago - no changes to DM medications  Only taking Januvia   Changed back to lisinopril by PCP  Denies CP, SOB, palpitations  No dizziness or lightheadedness  On atorvastatin and fenofibrate   Denies CVA symptoms or TIA   Quit smoking a couple months ago- remains on Chantix per PCP direction  No abdominal pain   Not sleeping well mostly due to frequent urination    DIET AND EXERCISE:  Weight Change: stable  Diet: Common adult  Exercise: sporadic irregular exercise        Objective      Objective:     Vitals:    08/31/23 1035   BP: 127/75   BP Location: Left arm   Patient Position: Sitting   BP Cuff Size: Adult   Pulse: 84   Weight: 75.8 kg (167 lb)   Height: 1.829 m (6')     Review of Systems   Constitutional:  Positive for malaise/fatigue.   Eyes:  Negative for blurred vision.   Respiratory:  Negative for cough and wheezing.    Cardiovascular:  Negative for chest pain, palpitations and leg swelling.   Gastrointestinal:  Negative for abdominal pain.   Genitourinary:  Positive for frequency and urgency.   Neurological:  Positive for weakness. Negative for dizziness and headaches.   Endo/Heme/Allergies:  Positive for polydipsia. Does not bruise/bleed easily.   Psychiatric/Behavioral:  The patient is not nervous/anxious.      Physical Exam  Vitals reviewed.   Constitutional:       General: He is not in acute distress.     Appearance: He is not diaphoretic.   HENT:      Head: Normocephalic and atraumatic.      Mouth/Throat:      Mouth: Mucous membranes are dry.   Eyes:      General: No scleral  icterus.  Cardiovascular:      Rate and Rhythm: Normal rate and regular rhythm.      Heart sounds: Normal heart sounds. No murmur heard.  Pulmonary:      Effort: Pulmonary effort is normal. No respiratory distress.      Breath sounds: Normal breath sounds. No wheezing or rales.   Musculoskeletal:      Right lower leg: No edema.      Left lower leg: No edema.   Skin:     General: Skin is warm and dry.      Coloration: Skin is pale.   Neurological:      General: No focal deficit present.      Mental Status: He is alert and oriented to person, place, and time.      Coordination: Coordination normal.      Gait: Gait is intact. Gait normal.   Psychiatric:         Mood and Affect: Mood and affect normal.         Behavior: Behavior normal.        DATA REVIEW    Lab Results   Component Value Date/Time    CHOLSTRLTOT 123 07/01/2020 06:35 AM    LDL 60 07/01/2020 06:35 AM    HDL 34 (A) 07/01/2020 06:35 AM    TRIGLYCERIDE 146 07/01/2020 06:35 AM       Lab Results   Component Value Date/Time    SODIUM 135 (L) 03/28/2022 12:32 PM    SODIUM 135 10/15/2021 02:08 PM    POTASSIUM 4.0 03/28/2022 12:32 PM    POTASSIUM 4.4 10/15/2021 02:08 PM    CHLORIDE 100 03/28/2022 12:32 PM    CHLORIDE 95 (L) 10/15/2021 02:08 PM    CO2 26.0 03/28/2022 12:32 PM    CO2 26 10/15/2021 02:08 PM    GLUCOSE 115 03/28/2022 12:32 PM    GLUCOSE 125 (H) 10/15/2021 02:08 PM    BUN 17.0 03/28/2022 12:32 PM    BUN 16 10/15/2021 02:08 PM    CREATININE 1.0 03/28/2022 12:32 PM    CREATININE 0.76 10/15/2021 02:08 PM    BUNCREATRAT 17.0 03/28/2022 12:32 PM    BUNCREATRAT 19 11/02/2016 09:13 AM     Lab Results   Component Value Date/Time    ALKPHOSPHAT 199 (H) 10/15/2021 02:08 PM    ASTSGOT 578 (H) 10/15/2021 02:08 PM    ALTSGPT 483 (H) 10/15/2021 02:08 PM    TBILIRUBIN 1.2 10/15/2021 02:08 PM       Lab Results   Component Value Date/Time    HBA1C 6.3 (A) 03/30/2023 12:00 AM       Lab Results   Component Value Date/Time    MALBCRT see below 12/22/2014 11:25 AM     MICROALBUR <0.5 12/22/2014 11:25 AM    MICRALB 18.1 07/29/2016 09:41 AM       Blood work from [facility] aug 2021:  Na 136, 4.8, ast 50, alt 50, GFR >60  a1c 6.3  Lipase 377  Total c 182, trig 288, hdc 48, ldl 56  Ck normal    Blood work from [facility] August 2022  Hemoglobin 14.9, WBC 6.6  Sodium 136, potassium 4.0, glucose 131, GFR greater than 60, AST 38, ALT 39, alk phos 58  A1c 6.3  Total cholesterol 146, triglycerides 507, HDL 35  Albumin creatinine ratio 5.6    Blood work from [facility] December 2022  Sodium 136, potassium 4.1, glucose 183, creatinine 0.99, GFR 88  A1c 6.8  Total cholesterol 258, triglycerides 544, HDL 61  LDL direct 133    Blood work from [facility] March 2023  WBC 6.0, hemoglobin 13.1, platelet 292  Sodium 137, potassium 4.8, glucose 127, AST 40, ALT 29  BUN 31, creatinine 1.69, GFR 46  A1c 6.3  LDL 66, HDL 60, triglycerides 332, cholesterol 193  CK 62  Albumin creatinine ratio normal    Medical Decision Making:  Today's Assessment / Status / Plan:     1. Type 2 diabetes mellitus without complication, without long-term current use of insulin ([facility])  Referral to Pharmacotherapy Service      2. Dyslipidemia        3. Metabolic syndrome           Patient Type: Primary Prevention    Etiology of Established CVD if Present: None, but does have 5 of 5 components of metabolic syndrome at baseline    Lipid Management: Qualifies for Statin Therapy Based on 2018 ACC/AHA Guidelines: yes  Calculated 10-Year Risk of ASCVD: N/A  Currently on Statin: Yes  LDL not able to calculate  Trigs very elevated >1000  Unable to obtain prescription strength omega-3 due to formulary issues  No myalgias and CK normal  Plan:  -Continue atorvastatin 40 mg daily  -Continue fenofibrate as recommended by PCP  -Recheck fasting lipids with direct LDL and CK     Blood Pressure Management:  Acc/[facility] (2017) Blood Pressure Goal <130/80  BP appears overtreated previously, now above goal at home and in office  No  albuminuria  Possible ACE inhibitor cough - improved with switch to Losartan  Reviewed slow position changes to help with dizziness/lightheadedness.     Plan:  - continue lisinopril 20mg daily - per PCP   - stopped HCTZ per PCP  - consider amlodipine as next agent if remains above goal  - Continue home blood pressure monitoring, take 3-4x's/week  -Call if has any further lightheadedness  -Recheck GFR-and electrolytes in the future    Glycemic Status: Diabetic  A1c goal less than 7 at least  Recent A1C very elevated 10.7, rapid increase from previous 6.3  Pt very symptomatic with polydipsia, polyuria and fatigue.    Has been drinking excessive water and urinating 4 times per hour.    Appears pale and dehydrated.   Long discussion with pt regarding this recent major change- discussed case with Dr. An and recommended pt be seen in ER to control his symptoms and begin to safely lower his glucose with insulin.  Will likely need additional labs completed and monitor for pancreatitis.  He is scheduled to be seen in DM clinic next week for an initial appt.  Plan:  - Continue Januvia 100 mg daily  - To ER for further treatment, once controlled will follow closely with DM clinic- msg left on pt's voicemail recommended evaluation in ER after review of labs from [facility].     Anti-Platelet/Anti-Coagulant Tx: no  Not necessarily indicated at present    Smoking: Current everyday smoker  He did fine Chantix helpful in the past and had no side effects from it, using currently, doing well.    Quit smoking- congratulated!!  - Recommended complete abstinence from all tobacco products   - Call our office with cravings  - Finish Chantix script as rec by PCP     Physical Activity: Encourage more walking    Weight Management and Nutrition: Per GI    Other:    1.  Choledocholithiasis and continued GI issues -defer further work-up and management to GI.  Encourage patient to talk to his PCP about whether or not he should see a  gastroenterologist    2.  Possible depression -as previously discussed with patient the work-up of his noncardiovascular symptoms is outside our realm of expertise.  That being said it does sound most consistent with a diagnosis of depression.  Again encouraged him to discuss this with his PCP who he will be following up with on a monthly basis.      3.  KEVIN/Snoring - ongoing, has never had sleep study previously.  Pt wife reports pt snores and has apneic periods.  Pt has poor sleep, typically getting 2-3 hours per night per report, + daytime sleepiness, and fatigue.  May have difficulty with eval due to insurance.  Referral placed to sleep medicine.  Encourage pt to continue to follow up with PCP on sleep hygiene/insomnia treatment.  Unable to address at this visit.      Instructed to follow-up with PCP for remainder of adult medical needs: yes  We will partner with other providers in the management of established vascular disease and cardiometabolic risk factors    Studies to Be Obtained: Per GI  Labs to Be Obtained: None    Follow up in: 1 week with T2DM clinic; in vasc med in 3-4 weeks     Lillian An APRDENILSON  [place] [facility]    Cc:    Augie Owens

## 2023-09-08 ENCOUNTER — NON-PROVIDER VISIT (OUTPATIENT)
Dept: VASCULAR LAB | Facility: MEDICAL CENTER | Age: 58
End: 2023-09-08
Attending: NURSE PRACTITIONER
Payer: MEDICAID

## 2023-09-08 ENCOUNTER — TELEPHONE (OUTPATIENT)
Dept: PHARMACY | Facility: MEDICAL CENTER | Age: 58
End: 2023-09-08

## 2023-09-08 DIAGNOSIS — E11.9 TYPE 2 DIABETES MELLITUS WITHOUT COMPLICATION, WITHOUT LONG-TERM CURRENT USE OF INSULIN (HCC): ICD-10-CM

## 2023-09-08 PROCEDURE — 99213 OFFICE O/P EST LOW 20 MIN: CPT

## 2023-09-08 NOTE — TELEPHONE ENCOUNTER
New order from In Basket for Synjardy 5-500MG tablets #60 for 30 days    Ran Test Claim-PA required    Submitted PA in CMM-Key#[id number]     Diagnosis-E11.9 - Type 2 diabetes mellitus without complications    Attached chart notes and answered clinical questions.    Waiting on Plan Response

## 2023-09-08 NOTE — PROGRESS NOTES
Patient Consult Note    TIME IN: 10.15 am  TIME OUT: 11:am    Primary care physician: Rivas Owens M.D.    Reason for consult: Management of Uncontrolled Type 2 Diabetes    HPI:  Leonard Christensen is a 58 y.o. old patient who comes in today for evaluation of above stated problem.    Most Recent HbA1c and POCT glucose:   Lab Results   Component Value Date/Time    HBA1C 6.3 (A) 03/30/2023 12:00 AM            Most Recent Scr:  Lab Results   Component Value Date/Time    CREATININE 1.0 03/28/2022 12:32 PM    CREATININE 0.76 10/15/2021 02:08 PM        Current Diabetes Medication Regimen  DPP-4 Inhibitor: Januvia 100 mg     Previous Diabetes Medications and Reason for Discontinuation  Meformin caused GI symptoms hence switched to Januvia    SMBG  Pt has home glucometer and proper testing technique -  Discussed BG Goals: FBG 80 - 130, 2hPP < 180, A1c < 7%    Pt reports blood sugars:   Before Breakfast: 300-400    Hypoglycemia  Hypoglycemia awareness - Yes  Nocturnal hypoglycemia- None   Hypoglycemia:  None    Pt's treatment of Hypoglycemia -  - 15:15 Rule    Lifestyle  Current Exercise - Walking   Exercise Goal - 150mins/week    Dietary - Low to Moderate carb in diet.     Preventative Management  BP regimen (ACE/ARB) - Lisinopril 20 mg  BP < 140/90 - Yes  Statin - Atorvastatin 40 mg  LDL <100 - Yes    Last A1c -   Lab Results   Component Value Date/Time    HBA1C 6.3 (A) 03/30/2023 12:00 AM        updated caregaps    Past Medical History:  Patient Active Problem List    Diagnosis Date Noted    Type 2 diabetes mellitus without complication, without long-term current use of insulin (HCC) 09/09/2021    Pelvic abscess in male (HCC) 07/02/2020    Normocytic anemia 07/01/2020    Hyperglycemia 07/01/2020    Pancreatitis 07/01/2020    Elevated liver enzymes 07/01/2020    BPH (benign prostatic hyperplasia) 07/01/2020    Dyslipidemia 07/01/2020    Hypertension     GI bleed     Chronic back pain 06/16/2011    Metabolic  syndrome 06/15/2011       Past Surgical History:  Past Surgical History:   Procedure Laterality Date    DC ERCP,DIAGNOSTIC N/A 10/18/2021    Procedure: ERCP, DIAGNOSTIC - THERAPEUTIC;  Surgeon: Missael Rehman M.D.;  Location: [facility];  Service: Gastroenterology    DC ERCP,DIAGNOSTIC  2020    Procedure: ERCP, DIAGNOSTIC - WITH SPHINCTEROTOMY WITH COMMON BILE DUCT STONE EXTRACTION WITH TEMPORARY STENT PLACEMENT;  Surgeon: Augie Abdul M.D.;  Location: [facility];  Service: Gastroenterology    DC ENDOSCOPIC US EXAM, ESOPH  2020    Procedure: EGD, WITH ENDOSCOPIC US - UPPER;  Surgeon: Augie Abdul M.D.;  Location: [facility];  Service: Gastroenterology    GASTROSCOPY  2020    Procedure: GASTROSCOPY;  Surgeon: Augie Abdul M.D.;  Location: [facility];  Service: Gastroenterology    EGD WITH ASP/BX  2020    Procedure: EGD, WITH ASPIRATION BIOPSY - POSSIBLE;  Surgeon: Augie Abdul M.D.;  Location: [facility];  Service: Gastroenterology    ORION BY LAPAROSCOPY         Allergies:  Pcn [penicillins]    Social History:  Social History     Socioeconomic History    Marital status:      Spouse name: Not on file    Number of children: Not on file    Years of education: Not on file    Highest education level: Not on file   Occupational History    Not on file   Tobacco Use    Smoking status: Former     Current packs/day: 0.00     Types: Cigarettes     Quit date: 2000     Years since quittin.7    Smokeless tobacco: Never   Vaping Use    Vaping Use: Never used   Substance and Sexual Activity    Alcohol use: No    Drug use: Never    Sexual activity: Yes     Partners: Female   Other Topics Concern    Not on file   Social History Narrative    Not on file     Social Determinants of Health     Financial Resource Strain: Not on file   Food Insecurity: Not on file   Transportation Needs: Not on file   Physical Activity:  Not on file   Stress: Not on file   Social Connections: Not on file   Intimate Partner Violence: Not on file   Housing Stability: Not on file       Family History:  Family History   Problem Relation Age of Onset    Cancer Mother 65        Lung    Heart Disease Father     Hypertension Father     Stroke Father        Medications:    Current Outpatient Medications:     lisinopril (PRINIVIL) 20 MG Tab, Take 20 mg by mouth every day., Disp: , Rfl:     JANUVIA 100 MG Tab, TAKE 1 TABLET BY MOUTH EVERY DAY, Disp: 30 Tablet, Rfl: 11    varenicline (CHANTIX) 1 MG tablet, Take 1 Tablet by mouth 2 times a day., Disp: 60 Tablet, Rfl: 11    atorvastatin (LIPITOR) 40 MG Tab, Take 1 Tablet by mouth every day., Disp: 30 Tablet, Rfl: 11    fenofibrate (TRICOR) 145 MG Tab, Take 1 Tablet by mouth every day., Disp: 30 Tablet, Rfl: 11    colestipol (COLESTID) 1 GM Tab, Take 1 Tablet by mouth 2 times a day., Disp: 60 Tablet, Rfl: 11    pancrelipase, Lip-Prot-Amyl, (CREON) 0077-9666 units Cap DR Particles, Take 3 Capsules by mouth 3 times a day with meals., Disp: 900 Capsule, Rfl: 90    vitamin D2, Ergocalciferol, (DRISDOL) 1.25 MG (15241 UT) Cap capsule, Take  by mouth every 7 days., Disp: , Rfl:     oxyCODONE-acetaminophen (PERCOCET-10)  MG Tab, Take 0.5-1 Tabs by mouth every 6 hours as needed for Severe Pain., Disp: , Rfl:     finasteride (PROSCAR) 5 MG Tab, Take 5 mg by mouth every evening., Disp: , Rfl:     therapeutic multivitamin-minerals (THERAGRAN-M) Tab, Take 1 Tab by mouth every day., Disp: , Rfl:     zolpidem (AMBIEN) 5 MG Tab, Take 2.5 mg by mouth at bedtime as needed for Sleep., Disp: , Rfl:     diazepam (VALIUM) 10 MG tablet, Take 10 mg by mouth every 6 hours as needed for Sleep., Disp: , Rfl:     Labs: Reviewed    Physical Examination:  Vital signs: There were no vitals taken for this visit. There is no height or weight on file to calculate BMI.    Assessment and Plan:    BUN 31, creatinine 1.69, GFR 46  A1c  6.3      1. DM2  Basic physiology of DMII was explained to patient as well as microvascular/macrovascular complications. The importance of increasing physical activity to improve diabetes control was discussed with the patient. Patient was also educated on changing diet and making better choices to help control blood sugar.   Discussed Goals: FBG 80 - 130, 2hPP < 180, a1c < 7.0%   Patient is a pleasant 59 y/o male with a history of DM II. Patient recently had an A1C increase from 6.3% 03/23 to 10.7 08/23. Patient does not know why there was a sudden increase in A1C , his diet and lifestyle had not changed. There was a medication change from Metformin to Januvia 100 mg in Aug 2022 as A1C was at goal.  Patient has been experiencing signs of hyperglycemia like polyuria and polydipsia. We discussed the possibility of insulin initiation if symptoms persists.  We discussed different classes of medications he might benefit from, patient currently has a BMI of 22 hence I will hold of on a GLP1 agonist at this time. He has been on metformin in the past which caused some GI side effects but symptoms were tolerable for several years. Patient might benefit from an SGLT-2I and metformin re-initiation.  Patient is to start testing BG at fasting daily and is to record.  I provided patient with ED precautions if symptoms of hyperglycemia persists where insulin administration will be needed.     - Medication changes:  Start Synjardy 5/500 mg twice daily (Start with one tablet daily for the first 7 days then titrate to twice daily). PA required for Synjardy hence I provided samples of Jardiance 10 mg pending PA approval.  - Continue:  Januvia 100 mg     - Lifestyle changes:  Focus on decreasing carbohydrates and increase physical activity.    Follow Up:  1 months    Suzie Short, PharmD    CC:   Rivas Owens M.D.

## 2023-09-08 NOTE — TELEPHONE ENCOUNTER
PA for Empagliflozin-metFORMIN HCl 5-500 MG Tab   has been approved for a quantity of 60 , day supply 30    PA reference number: [id number]  Insurance: [place] MEDICAID  Effective dates: 09/08/2023-09/07/2024  Copay: $0     Is patient eligible to fill with [facility]? Yes    Next Steps: The Patients copay is less than $5.00. Will contact the patient to determine choice of pharmacy, if applicable.    PRAVIN aBig, PhT  Pharmacy Liaison  P: [phone]  9/8/2023 1:58 PM

## 2023-09-12 ENCOUNTER — OFFICE VISIT (OUTPATIENT)
Dept: VASCULAR LAB | Facility: MEDICAL CENTER | Age: 58
End: 2023-09-12
Attending: INTERNAL MEDICINE
Payer: MEDICAID

## 2023-09-12 VITALS
HEART RATE: 93 BPM | SYSTOLIC BLOOD PRESSURE: 115 MMHG | WEIGHT: 166 LBS | HEIGHT: 72 IN | DIASTOLIC BLOOD PRESSURE: 73 MMHG | BODY MASS INDEX: 22.48 KG/M2

## 2023-09-12 DIAGNOSIS — I10 HYPERTENSION, UNSPECIFIED TYPE: ICD-10-CM

## 2023-09-12 DIAGNOSIS — F17.200 SMOKING: ICD-10-CM

## 2023-09-12 DIAGNOSIS — E78.5 DYSLIPIDEMIA: ICD-10-CM

## 2023-09-12 DIAGNOSIS — E11.9 TYPE 2 DIABETES MELLITUS WITHOUT COMPLICATION, WITHOUT LONG-TERM CURRENT USE OF INSULIN (HCC): ICD-10-CM

## 2023-09-12 PROCEDURE — 3078F DIAST BP <80 MM HG: CPT | Performed by: INTERNAL MEDICINE

## 2023-09-12 PROCEDURE — 3074F SYST BP LT 130 MM HG: CPT | Performed by: INTERNAL MEDICINE

## 2023-09-12 PROCEDURE — 99212 OFFICE O/P EST SF 10 MIN: CPT

## 2023-09-12 PROCEDURE — 99214 OFFICE O/P EST MOD 30 MIN: CPT | Performed by: INTERNAL MEDICINE

## 2023-09-12 ASSESSMENT — FIBROSIS 4 INDEX: FIB4 SCORE: 8.62

## 2023-09-12 NOTE — PROGRESS NOTES
VASCULAR MEDICINE CLINIC - Follow Up Visit  09/12/23    Leonard Christensen is a 58 y.o. male who presents today  for vascular f/u    Subjective     HPI:  Patient here for follow up of dyslipidemia, htn, and dm and smoking  Noticed frequent urination over past few weeks  BP was high - was put back on hctz for a time.  Unable to get sleep study due to insurance  Quit smoking 3 months ago.  On chantix  Mood has been stable. No weird dreams  No acute abdominal pain  Denies etoh  Saw pharmacy last week who started synjardy - patient hasn't picked it up yet - is on jardiance samples  Remains on januvia  Denies missing doses.  Changed back to lisinopril without diuretic  FS 200s this am.   BPs at home 110s-120s/70s  No cv symptoms  Good adherence with fenofibrate at [facility]  No myalgias  Denies acute abd pain  No recent infection    DIET AND EXERCISE:  Weight Change: stable  Diet: reasonable diabetic diet  Exercise: fairly active       Objective      Objective:     Vitals:    09/12/23 1012   BP: 115/73   BP Location: Left arm   Patient Position: Sitting   BP Cuff Size: Adult   Pulse: 93   Weight: 75.3 kg (166 lb)   Height: 1.829 m (6')         Physical Exam  Vitals reviewed.   Constitutional:       General: He is not in acute distress.     Appearance: He is not diaphoretic.   HENT:      Head: Normocephalic and atraumatic.      Mouth/Throat:      Mouth: Mucous membranes are dry.   Eyes:      General: No scleral icterus.  Cardiovascular:      Rate and Rhythm: Normal rate and regular rhythm.      Heart sounds: Normal heart sounds. No murmur heard.  Pulmonary:      Effort: Pulmonary effort is normal. No respiratory distress.      Breath sounds: Normal breath sounds. No wheezing or rales.   Musculoskeletal:      Right lower leg: No edema.      Left lower leg: No edema.   Skin:     General: Skin is warm.      Coloration: Skin is not pale.   Neurological:      General: No focal deficit present.      Mental Status: He is alert and  oriented to person, place, and time.      Coordination: Coordination normal.      Gait: Gait is intact. Gait normal.   Psychiatric:         Mood and Affect: Mood and affect normal.         Behavior: Behavior normal.        DATA REVIEW    Lab Results   Component Value Date/Time    CHOLSTRLTOT 123 07/01/2020 06:35 AM    LDL 60 07/01/2020 06:35 AM    HDL 34 (A) 07/01/2020 06:35 AM    TRIGLYCERIDE 146 07/01/2020 06:35 AM       Lab Results   Component Value Date/Time    SODIUM 135 (L) 03/28/2022 12:32 PM    SODIUM 135 10/15/2021 02:08 PM    POTASSIUM 4.0 03/28/2022 12:32 PM    POTASSIUM 4.4 10/15/2021 02:08 PM    CHLORIDE 100 03/28/2022 12:32 PM    CHLORIDE 95 (L) 10/15/2021 02:08 PM    CO2 26.0 03/28/2022 12:32 PM    CO2 26 10/15/2021 02:08 PM    GLUCOSE 115 03/28/2022 12:32 PM    GLUCOSE 125 (H) 10/15/2021 02:08 PM    BUN 17.0 03/28/2022 12:32 PM    BUN 16 10/15/2021 02:08 PM    CREATININE 1.0 03/28/2022 12:32 PM    CREATININE 0.76 10/15/2021 02:08 PM    BUNCREATRAT 17.0 03/28/2022 12:32 PM    BUNCREATRAT 19 11/02/2016 09:13 AM     Lab Results   Component Value Date/Time    ALKPHOSPHAT 199 (H) 10/15/2021 02:08 PM    ASTSGOT 578 (H) 10/15/2021 02:08 PM    ALTSGPT 483 (H) 10/15/2021 02:08 PM    TBILIRUBIN 1.2 10/15/2021 02:08 PM       Lab Results   Component Value Date/Time    HBA1C 6.3 (A) 03/30/2023 12:00 AM       Lab Results   Component Value Date/Time    MALBCRT see below 12/22/2014 11:25 AM    MICROALBUR <0.5 12/22/2014 11:25 AM    MICRALB 18.1 07/29/2016 09:41 AM       Blood work from [facility] aug 2021:  Na 136, 4.8, ast 50, alt 50, GFR >60  a1c 6.3  Lipase 377  Total c 182, trig 288, hdc 48, ldl 56  Ck normal    Blood work from [facility] August 2022  Hemoglobin 14.9, WBC 6.6  Sodium 136, potassium 4.0, glucose 131, GFR greater than 60, AST 38, ALT 39, alk phos 58  A1c 6.3  Total cholesterol 146, triglycerides 507, HDL 35  Albumin creatinine ratio 5.6    Blood work from [facility] December 2022  Sodium 136,  potassium 4.1, glucose 183, creatinine 0.99, GFR 88  A1c 6.8  Total cholesterol 258, triglycerides 544, HDL 61  LDL direct 133    Blood work from [facility] March 2023  WBC 6.0, hemoglobin 13.1, platelet 292  Sodium 137, potassium 4.8, glucose 127, AST 40, ALT 29  BUN 31, creatinine 1.69, GFR 46  A1c 6.3  LDL 66, HDL 60, triglycerides 332, cholesterol 193  CK 62  Albumin creatinine ratio normal    Blood work from [facility] August 2023  WBC 8.0, hemoglobin 15.9  Sodium 129, potassium 4.6, glucose 275, GFR 62  A1c 10.7  Total cholesterol 273, triglycerides greater than 1100, HDL 42, LDL calculated not performed  Albumin creatinine ratio in the urine less than 8          Medical Decision Making:  Today's Assessment / Status / Plan:     1. Type 2 diabetes mellitus without complication, without long-term current use of insulin (HCC)  HEMOGLOBIN A1C      2. Dyslipidemia  Comp Metabolic Panel    Lipid Profile    LDL, DIRECT      3. Smoking        4. Hypertension, unspecified type  Comp Metabolic Panel         Patient Type: Primary Prevention    Etiology of Established CVD if Present: None, but does have 5 of 5 components of metabolic syndrome at baseline    Lipid Management: Qualifies for Statin Therapy Based on 2018 ACC/AHA Guidelines: yes  Calculated 10-Year Risk of ASCVD: N/A  Currently on Statin: Yes  LDL not able to calculate  Trigs very elevated >1000  Unable to obtain prescription strength omega-3 due to formulary issues in the past  No myalgias and CK normal  Plan:  -Continue atorvastatin 40 mg daily  -Continue fenofibrate as recommended by PCP  -Intensify TLC and glucose control as per below  -Recheck fasting lipids with direct LDL and CK prior to next visit    Blood Pressure Management:  [place] (2017) Blood Pressure Goal <130/80  BP appears overtreated previously, now above goal at home and in office  No albuminuria  Possible ACE inhibitor cough - improved with switch to Losartan  Seem to have  worsening metabolic parameters on hydrochlorothiazide  Now back on lisinopril monotherapy  Plan:  - continue lisinopril 20mg daily for now but can consider to switch back to ARB in the future if cough returns  -Avoid HCT  - consider amlodipine as next agent if remains above goal  - Continue home blood pressure monitoring  -Call if has any further lightheadedness  -Recheck GFR-and electrolytes prior to next visit    Glycemic Status: Diabetic  A1c goal less than 7 at least  Recent A1C very elevated 10.7, rapid increase from previous 6.3  Pt very symptomatic with polydipsia, polyuria and fatigue.    Has been drinking excessive water and urinating 4 times per hour.  Unclear reason for worsening control.  No evidence of infection.  Patient states he is compliant  Plan:  - Continue Januvia 100 mg daily  -Agree with addition of Synjardy  -Follow-up with clinical pharmacist as scheduled    Anti-Platelet/Anti-Coagulant Tx: no  Not necessarily indicated at present    Smoking: Current everyday smoker  Tolerating Chantix without any affective disorder  Quit smoking- congratulated!!  - Recommended complete abstinence from all tobacco products   - Call our office with cravings  -Continue Chantix for now    Physical Activity: Encourage more walking    Weight Management and Nutrition: Per GI    Other:    1.  Choledocholithiasis and continued GI issues -defer further work-up and management to GI.  Encourage patient to talk to his PCP about whether or not he should see a gastroenterologist    2.  KEVIN/Snoring - ongoing, has never had sleep study previously.  He was referred for sleep study but apparently his insurance would not pay for.  Will defer further work-up and management to PCP    Instructed to follow-up with PCP for remainder of adult medical needs: yes  We will partner with other providers in the management of established vascular disease and cardiometabolic risk factors    Studies to Be Obtained: None  Labs to Be Obtained:  As above prior to next visit    Follow up in:   1) 1 month in diabetes pharmacotherapy clinic  2) 3 months with me in vascular medicine clinic    Michael Bloch, MD  [facility]

## 2023-10-05 ENCOUNTER — APPOINTMENT (OUTPATIENT)
Dept: VASCULAR LAB | Facility: MEDICAL CENTER | Age: 58
End: 2023-10-05
Payer: MEDICAID

## 2023-10-06 ENCOUNTER — DOCUMENTATION (OUTPATIENT)
Dept: VASCULAR LAB | Facility: MEDICAL CENTER | Age: 58
End: 2023-10-06
Payer: MEDICAID

## 2023-10-06 NOTE — PROGRESS NOTES
Patient cancelled f/u with pharmacotherapy clinic for diabetes.  S/w patient today, requests c/b in a few weeks, unable to schedule at this time.  Ovidio Rivas, PharmD, BCACP

## 2023-10-30 ENCOUNTER — DOCUMENTATION (OUTPATIENT)
Dept: VASCULAR LAB | Facility: MEDICAL CENTER | Age: 58
End: 2023-10-30
Payer: MEDICAID

## 2023-10-30 NOTE — PROGRESS NOTES
[facility]      Spoke with the patient today about rescheduling his follow up visit in the pharmacotherapy clinic for DM management.     Patient reports that he is doing well and does not think he necessarily needs to be seen in our clinic anymore. He has follow up visit with Dr. Bloch in December and would like to  discuss this with him. Encouraged him to do so and to call our clinic if he would be open to follow up visits with our clinic periodically to maintain good glycemic control.     Will wait to hear back from the patient in December.    John LynchD

## 2023-12-08 DIAGNOSIS — E11.9 TYPE 2 DIABETES MELLITUS WITHOUT COMPLICATION, WITHOUT LONG-TERM CURRENT USE OF INSULIN (HCC): ICD-10-CM

## 2023-12-08 RX ORDER — EMPAGLIFLOZIN AND METFORMIN HYDROCHLORIDE 5; 500 MG/1; MG/1
1 TABLET ORAL 2 TIMES DAILY
Qty: 60 TABLET | Refills: 2 | Status: SHIPPED | OUTPATIENT
Start: 2023-12-08 | End: 2024-03-12

## 2023-12-14 ENCOUNTER — DOCUMENTATION (OUTPATIENT)
Dept: VASCULAR LAB | Facility: MEDICAL CENTER | Age: 58
End: 2023-12-14
Payer: MEDICAID

## 2023-12-14 ENCOUNTER — OFFICE VISIT (OUTPATIENT)
Dept: VASCULAR LAB | Facility: MEDICAL CENTER | Age: 58
End: 2023-12-14
Attending: INTERNAL MEDICINE
Payer: MEDICAID

## 2023-12-14 VITALS
DIASTOLIC BLOOD PRESSURE: 72 MMHG | HEART RATE: 87 BPM | HEIGHT: 72 IN | BODY MASS INDEX: 22.21 KG/M2 | WEIGHT: 164 LBS | SYSTOLIC BLOOD PRESSURE: 121 MMHG

## 2023-12-14 DIAGNOSIS — E11.9 TYPE 2 DIABETES MELLITUS WITHOUT COMPLICATION, WITHOUT LONG-TERM CURRENT USE OF INSULIN (HCC): Primary | ICD-10-CM

## 2023-12-14 DIAGNOSIS — E78.5 DYSLIPIDEMIA: ICD-10-CM

## 2023-12-14 DIAGNOSIS — F17.200 SMOKING: ICD-10-CM

## 2023-12-14 DIAGNOSIS — E11.9 TYPE 2 DIABETES MELLITUS WITHOUT COMPLICATION, WITHOUT LONG-TERM CURRENT USE OF INSULIN (HCC): ICD-10-CM

## 2023-12-14 DIAGNOSIS — I10 HYPERTENSION, UNSPECIFIED TYPE: ICD-10-CM

## 2023-12-14 PROCEDURE — 99212 OFFICE O/P EST SF 10 MIN: CPT

## 2023-12-14 PROCEDURE — 3074F SYST BP LT 130 MM HG: CPT | Performed by: INTERNAL MEDICINE

## 2023-12-14 PROCEDURE — 3078F DIAST BP <80 MM HG: CPT | Performed by: INTERNAL MEDICINE

## 2023-12-14 PROCEDURE — 99214 OFFICE O/P EST MOD 30 MIN: CPT | Performed by: INTERNAL MEDICINE

## 2023-12-14 RX ORDER — LANCETS 30 GAUGE
EACH MISCELLANEOUS
Qty: 100 EACH | Refills: 3 | Status: SHIPPED | OUTPATIENT
Start: 2023-12-14

## 2023-12-14 NOTE — PROGRESS NOTES
Patient seen by Dr. Bloch today. Patient reports that he does not like his glucose testing supplies and would like a different one sent in to the pharmacy. He currently uses [employer] [facility].    Will send generic Rx for testing supplies for pharmacy to dispense alternative brand preferred by insurance. However, also advised that given A1c is well controlled and since he is not on medications with high risk for hypoglycemia, it would be reasonable to only monitor PRN. Patient verbalized understanding of instructions.    Fred Weathers, SyedD, BCACP

## 2023-12-14 NOTE — PROGRESS NOTES
VASCULAR MEDICINE CLINIC - Follow Up Visit  12/14/23    Leonard Christensen is a 58 y.o. male who presents today  for vascular f/u    Subjective     HPI:  Patient here for follow up of dyslipidemia, htn, and dm and smoking  Denies any tobacco use  Remains on chantix  Mood has been stable. No weird dreams  Denies etoh  Remains on Synjardy  Remains on januvia  Denies missing doses.  Remains on lisinopril without diuretic  FS much improved  BPs at home 110s-120s/70s  No cv symptoms  Good adherence with fenofibrate at [facility]  No myalgias  Denies acute abd pain  No recent infection    DIET AND EXERCISE:  Weight Change: Down 5 or 6 pounds  Diet: reasonable diabetic diet  Exercise: fairly active       Objective      Objective:     Vitals:    12/14/23 1400   BP: 121/72   BP Location: Left arm   Patient Position: Sitting   BP Cuff Size: Adult   Pulse: 87   Weight: 74.4 kg (164 lb)   Height: 1.829 m (6')         Physical Exam  Vitals reviewed.   Constitutional:       General: He is not in acute distress.     Appearance: He is not diaphoretic.   HENT:      Head: Normocephalic and atraumatic.      Mouth/Throat:      Mouth: Mucous membranes are dry.   Eyes:      General: No scleral icterus.  Cardiovascular:      Rate and Rhythm: Normal rate and regular rhythm.      Heart sounds: Normal heart sounds. No murmur heard.  Pulmonary:      Effort: Pulmonary effort is normal. No respiratory distress.      Breath sounds: Normal breath sounds. No wheezing or rales.   Musculoskeletal:      Right lower leg: No edema.      Left lower leg: No edema.   Skin:     General: Skin is warm.      Coloration: Skin is not pale.   Neurological:      General: No focal deficit present.      Mental Status: He is alert and oriented to person, place, and time.      Coordination: Coordination normal.      Gait: Gait is intact. Gait normal.   Psychiatric:         Mood and Affect: Mood and affect normal.         Behavior: Behavior normal.        DATA  REVIEW    Lab Results   Component Value Date/Time    CHOLSTRLTOT 123 07/01/2020 06:35 AM    LDL 60 07/01/2020 06:35 AM    HDL 34 (A) 07/01/2020 06:35 AM    TRIGLYCERIDE 146 07/01/2020 06:35 AM       Lab Results   Component Value Date/Time    SODIUM 135 (L) 03/28/2022 12:32 PM    SODIUM 135 10/15/2021 02:08 PM    POTASSIUM 4.0 03/28/2022 12:32 PM    POTASSIUM 4.4 10/15/2021 02:08 PM    CHLORIDE 100 03/28/2022 12:32 PM    CHLORIDE 95 (L) 10/15/2021 02:08 PM    CO2 26.0 03/28/2022 12:32 PM    CO2 26 10/15/2021 02:08 PM    GLUCOSE 115 03/28/2022 12:32 PM    GLUCOSE 125 (H) 10/15/2021 02:08 PM    BUN 17.0 03/28/2022 12:32 PM    BUN 16 10/15/2021 02:08 PM    CREATININE 1.0 03/28/2022 12:32 PM    CREATININE 0.76 10/15/2021 02:08 PM    BUNCREATRAT 17.0 03/28/2022 12:32 PM    BUNCREATRAT 19 11/02/2016 09:13 AM     Lab Results   Component Value Date/Time    ALKPHOSPHAT 199 (H) 10/15/2021 02:08 PM    ASTSGOT 578 (H) 10/15/2021 02:08 PM    ALTSGPT 483 (H) 10/15/2021 02:08 PM    TBILIRUBIN 1.2 10/15/2021 02:08 PM       Lab Results   Component Value Date/Time    HBA1C 6.3 (A) 03/30/2023 12:00 AM       Lab Results   Component Value Date/Time    MALBCRT see below 12/22/2014 11:25 AM    MICROALBUR <0.5 12/22/2014 11:25 AM    MICRALB 18.1 07/29/2016 09:41 AM       Blood work from [facility] aug 2021:  Na 136, 4.8, ast 50, alt 50, GFR >60  a1c 6.3  Lipase 377  Total c 182, trig 288, hdc 48, ldl 56  Ck normal    Blood work from [facility] August 2022  Hemoglobin 14.9, WBC 6.6  Sodium 136, potassium 4.0, glucose 131, GFR greater than 60, AST 38, ALT 39, alk phos 58  A1c 6.3  Total cholesterol 146, triglycerides 507, HDL 35  Albumin creatinine ratio 5.6    Blood work from [facility] December 2022  Sodium 136, potassium 4.1, glucose 183, creatinine 0.99, GFR 88  A1c 6.8  Total cholesterol 258, triglycerides 544, HDL 61  LDL direct 133    Blood work from [facility] March 2023  WBC 6.0, hemoglobin 13.1, platelet 292  Sodium 137,  potassium 4.8, glucose 127, AST 40, ALT 29  BUN 31, creatinine 1.69, GFR 46  A1c 6.3  LDL 66, HDL 60, triglycerides 332, cholesterol 193  CK 62  Albumin creatinine ratio normal    Blood work from [facility] August 2023  WBC 8.0, hemoglobin 15.9  Sodium 129, potassium 4.6, glucose 275, GFR 62  A1c 10.7  Total cholesterol 273, triglycerides greater than 1100, HDL 42, LDL calculated not performed  Albumin creatinine ratio in the urine less than 8    Blood work from [facility] December 2023  Sodium 137, potassium 5.0, BUN 21, creatinine 1.22, GFR 68,  A1c 6.6  Triglycerides 114, LDL less than 100, HDL has increased significantly          Medical Decision Making:  Today's Assessment / Status / Plan:     1. Type 2 diabetes mellitus without complication, without long-term current use of insulin ([facility])  HEMOGLOBIN A1C    MICROALBUMIN CREAT RATIO URINE      2. Dyslipidemia  Comp Metabolic Panel    Lipid Profile      3. Smoking        4. Hypertension, unspecified type  Comp Metabolic Panel         Patient Type: Primary Prevention    Etiology of Established CVD if Present: None, but does have 5 of 5 components of metabolic syndrome at baseline    Lipid Management: Qualifies for Statin Therapy Based on 2018 ACC/AHA Guidelines: yes  Calculated 10-Year Risk of ASCVD: N/A  Currently on Statin: Yes  Tremendously improved control most recent blood work  Unable to obtain prescription strength omega-3 due to formulary issues in the past  No myalgias and CK normal  Plan:  -Continue atorvastatin 40 mg daily  -Continue fenofibrate for now but could consider discontinuation if triglycerides remain under good control  -Intensify TLC and glucose control as per below  -Recheck fasting lipids prior to next visit    Blood Pressure Management:  Acc/aha (2017) Blood Pressure Goal <130/80  BP appears overtreated previously, now above goal at home and in office  No albuminuria  Possible ACE inhibitor cough, but mild and tolerable   seemed  to have worsening metabolic parameters on hydrochlorothiazide  Plan:  - continue lisinopril 20mg daily for now but can consider to switch back to ARB in the future if cough returns  -Avoid HCT  - consider amlodipine as next agent if remains above goal  - Continue home blood pressure monitoring  -Call if has any further lightheadedness  -Recheck GFR-and electrolytes prior to next visit    Glycemic Status: Diabetic  A1c goal less than 7 at least  Much improved control  Plan:  - Continue Januvia 100 mg daily  -Continue Synjardy  -Recheck A1c and urine for microalbumin prior to next visit    Anti-Platelet/Anti-Coagulant Tx: no  Not necessarily indicated at present    Smoking: Quit a few months ago  Tolerating Chantix without any affective disorder  Quit smoking- congratulated!!  - Recommended complete abstinence from all tobacco products   - Call our office with cravings  -Continue Chantix for now    Physical Activity: Encourage more walking    Weight Management and Nutrition: Continue associated weight loss and decrease in simple carbohydrates    Other:    1.  Choledocholithiasis and continued GI issues -defer further work-up and management to GI.  Encourage patient to talk to his PCP about whether or not he should see a gastroenterologist    2.  KEVIN/Snoring - ongoing, has never had sleep study previously.  He was referred for sleep study but apparently his insurance would not pay for.  Will defer further work-up and management to PCP    Instructed to follow-up with PCP for remainder of adult medical needs: yes  We will partner with other providers in the management of established vascular disease and cardiometabolic risk factors    Studies to Be Obtained: None  Labs to Be Obtained: As above prior to next visit    Follow up in: 6 mo    Michael Bloch, MD  Vascular Care    CC: Rivas Owens

## 2023-12-14 NOTE — PROGRESS NOTES
Faxed request for most recent blood work results from [facility] for Dr Bloch.  (Fx#[phone])    Fx confirmation in media.    Kamla Rose, Med Ass't  [facility]  Ph. [phone]  Fx. [phone]

## 2024-01-04 ENCOUNTER — TELEPHONE (OUTPATIENT)
Dept: VASCULAR LAB | Facility: MEDICAL CENTER | Age: 59
End: 2024-01-04

## 2024-01-04 NOTE — TELEPHONE ENCOUNTER
Caller:  Leonard    Topic/issue: Leonard called, his insurance is needing a pre-authorization.     Callback Number: [phone]    Thank you,   Macy OREILLY

## 2024-01-05 ENCOUNTER — TELEPHONE (OUTPATIENT)
Dept: SCHEDULING | Facility: IMAGING CENTER | Age: 59
End: 2024-01-05
Payer: MEDICAID

## 2024-01-05 DIAGNOSIS — E78.5 DYSLIPIDEMIA: ICD-10-CM

## 2024-01-05 DIAGNOSIS — E11.9 TYPE 2 DIABETES MELLITUS WITHOUT COMPLICATION, WITHOUT LONG-TERM CURRENT USE OF INSULIN (HCC): ICD-10-CM

## 2024-01-05 PROCEDURE — RXMED WILLOW AMBULATORY MEDICATION CHARGE: Performed by: INTERNAL MEDICINE

## 2024-01-05 RX ORDER — ATORVASTATIN CALCIUM 40 MG/1
40 TABLET, FILM COATED ORAL NIGHTLY
Qty: 90 TABLET | Refills: 1 | Status: SHIPPED | OUTPATIENT
Start: 2024-01-05

## 2024-01-05 RX ORDER — ATORVASTATIN CALCIUM 40 MG/1
40 TABLET, FILM COATED ORAL DAILY
Qty: 30 TABLET | Refills: 11 | Status: CANCELLED | OUTPATIENT
Start: 2024-01-05

## 2024-01-05 NOTE — TELEPHONE ENCOUNTER
Caller: Leonard Beltran Vici    Topic/issue: Patient states that he has been out of the following medication:     atorvastatin (LIPITOR) 40 MG Tab     [facility] pharmacy informed patient that they can not order this medication anymore. I asked if he had called any other pharmacies to see if they supply and he answered no. He is not sure how to do that. He did mention that he can not use [facility] due to insurance.  Patient asking for assistance.     Callback Number: [phone] (home)     Thank you,  Yola SAHA

## 2024-01-08 ENCOUNTER — PHARMACY VISIT (OUTPATIENT)
Dept: PHARMACY | Facility: MEDICAL CENTER | Age: 59
End: 2024-01-08
Payer: COMMERCIAL

## 2024-03-09 DIAGNOSIS — E11.9 TYPE 2 DIABETES MELLITUS WITHOUT COMPLICATION, WITHOUT LONG-TERM CURRENT USE OF INSULIN (HCC): ICD-10-CM

## 2024-03-12 RX ORDER — EMPAGLIFLOZIN AND METFORMIN HYDROCHLORIDE 5; 500 MG/1; MG/1
1 TABLET ORAL 2 TIMES DAILY
Qty: 60 TABLET | Refills: 5 | Status: SHIPPED | OUTPATIENT
Start: 2024-03-12 | End: 2024-03-13

## 2024-03-13 DIAGNOSIS — E11.9 TYPE 2 DIABETES MELLITUS WITHOUT COMPLICATION, WITHOUT LONG-TERM CURRENT USE OF INSULIN (HCC): ICD-10-CM

## 2024-03-13 RX ORDER — EMPAGLIFLOZIN AND METFORMIN HYDROCHLORIDE 5; 500 MG/1; MG/1
1 TABLET ORAL 2 TIMES DAILY
Qty: 60 TABLET | Refills: 2 | Status: SHIPPED | OUTPATIENT
Start: 2024-03-13

## 2024-04-09 ENCOUNTER — TELEPHONE (OUTPATIENT)
Dept: VASCULAR LAB | Facility: MEDICAL CENTER | Age: 59
End: 2024-04-09
Payer: MEDICAID

## 2024-04-09 NOTE — TELEPHONE ENCOUNTER
Caller:  Leonard    Topic/issue: Leonard would like to add a Vitamin D check to his lab orders    He would like the orders mailed to him.     Callback Number: [phone]    Thank you,   Macy OREILLY

## 2024-04-10 DIAGNOSIS — F17.200 SMOKING: ICD-10-CM

## 2024-04-10 RX ORDER — VARENICLINE TARTRATE 1 MG/1
1 TABLET, FILM COATED ORAL 2 TIMES DAILY
Qty: 60 TABLET | Refills: 2 | Status: SHIPPED | OUTPATIENT
Start: 2024-04-10

## 2024-04-12 NOTE — TELEPHONE ENCOUNTER
Returned ph call and spoke with patient that he'll need to contact his PCP for the additional lab.  Per RN Sary Bonilla.     Kamla Rose, Med Ass't  [facility]  Ph. [phone]  Fx. [phone]

## 2024-05-20 LAB — HBA1C MFR BLD: 6.3 % (ref ?–5.8)

## 2024-05-28 ENCOUNTER — TELEPHONE (OUTPATIENT)
Dept: VASCULAR LAB | Facility: MEDICAL CENTER | Age: 59
End: 2024-05-28
Payer: MEDICAID

## 2024-05-28 NOTE — TELEPHONE ENCOUNTER
Established patient  Chart prep for upcoming appointment.    Any pending/incomplete orders from last visit? Yes Labs  Was patient called and reminded to complete pending orders? Yes  Were any records requested?  YES.  [facility] fax request sent on 05/29/24.    Referral up to date? Yes  Referral attached to appointment (renewals and New patients only)? N/A (established with up-to-date referral)  Virtual appointment? No    Kamla Rose Med Ass't  [facility]  Ph. [phone]  Fx. [phone]

## 2024-06-03 ENCOUNTER — OFFICE VISIT (OUTPATIENT)
Dept: VASCULAR LAB | Facility: MEDICAL CENTER | Age: 59
End: 2024-06-03
Attending: INTERNAL MEDICINE
Payer: MEDICAID

## 2024-06-03 VITALS
WEIGHT: 174 LBS | HEART RATE: 87 BPM | BODY MASS INDEX: 23.57 KG/M2 | HEIGHT: 72 IN | SYSTOLIC BLOOD PRESSURE: 117 MMHG | DIASTOLIC BLOOD PRESSURE: 75 MMHG

## 2024-06-03 DIAGNOSIS — I10 HYPERTENSION, UNSPECIFIED TYPE: ICD-10-CM

## 2024-06-03 DIAGNOSIS — E78.5 DYSLIPIDEMIA: ICD-10-CM

## 2024-06-03 DIAGNOSIS — E11.9 TYPE 2 DIABETES MELLITUS WITHOUT COMPLICATION, WITHOUT LONG-TERM CURRENT USE OF INSULIN (HCC): ICD-10-CM

## 2024-06-03 DIAGNOSIS — F17.200 SMOKING: ICD-10-CM

## 2024-06-03 PROCEDURE — G2211 COMPLEX E/M VISIT ADD ON: HCPCS | Performed by: INTERNAL MEDICINE

## 2024-06-03 PROCEDURE — 3074F SYST BP LT 130 MM HG: CPT | Performed by: INTERNAL MEDICINE

## 2024-06-03 PROCEDURE — 99212 OFFICE O/P EST SF 10 MIN: CPT

## 2024-06-03 PROCEDURE — 3078F DIAST BP <80 MM HG: CPT | Performed by: INTERNAL MEDICINE

## 2024-06-03 PROCEDURE — 99214 OFFICE O/P EST MOD 30 MIN: CPT | Performed by: INTERNAL MEDICINE

## 2024-06-03 NOTE — PROGRESS NOTES
VASCULAR MEDICINE CLINIC - Follow Up Visit  06/03/24    Leonard Christensen is a 58 y.o. male who presents today  for vascular f/u    Subjective     HPI:  Patient here for follow up of dyslipidemia, htn, and dm and smoking  Denies any tobacco use for over a year  Remains on chantix  Mood has been stable. No weird dreams  No real cravings  Denies etoh  Remains on Synjardy  Remains on januvia  Remains on lisinopril without diuretic  FS much improved  BPs at home 110s-120s/70s  Cough resolved  No cv symptoms  Good adherence with fenofibrate at [facility]va  No myalgias    DIET AND EXERCISE:  Weight Change: Down 5 or 6 pounds and maintained  Diet: reasonable diabetic diet  Exercise: fairly active       Objective      Objective:     Vitals:    06/03/24 1324   BP: 117/75   BP Location: Left arm   Patient Position: Sitting   BP Cuff Size: Adult   Pulse: 87   Weight: 78.9 kg (174 lb)   Height: 1.829 m (6')     Wt Readings from Last 4 Encounters:   06/03/24 78.9 kg (174 lb)   12/14/23 74.4 kg (164 lb)   09/12/23 75.3 kg (166 lb)   08/31/23 75.8 kg (167 lb)      Physical Exam  Vitals reviewed.   Constitutional:       General: He is not in acute distress.     Appearance: He is not diaphoretic.   HENT:      Head: Normocephalic and atraumatic.      Mouth/Throat:      Mouth: Mucous membranes are dry.   Eyes:      General: No scleral icterus.  Cardiovascular:      Rate and Rhythm: Normal rate and regular rhythm.      Heart sounds: Normal heart sounds. No murmur heard.  Pulmonary:      Effort: Pulmonary effort is normal. No respiratory distress.      Breath sounds: Normal breath sounds. No wheezing or rales.   Musculoskeletal:      Right lower leg: No edema.      Left lower leg: No edema.   Skin:     General: Skin is warm.      Coloration: Skin is not pale.   Neurological:      General: No focal deficit present.      Mental Status: He is alert and oriented to person, place, and time.      Coordination: Coordination normal.      Gait:  Gait is intact. Gait normal.   Psychiatric:         Mood and Affect: Mood and affect normal.         Behavior: Behavior normal.        DATA REVIEW    Lab Results   Component Value Date/Time    CHOLSTRLTOT 123 07/01/2020 06:35 AM    LDL 60 07/01/2020 06:35 AM    HDL 34 (A) 07/01/2020 06:35 AM    TRIGLYCERIDE 146 07/01/2020 06:35 AM       Lab Results   Component Value Date/Time    SODIUM 135 (L) 03/28/2022 12:32 PM    SODIUM 135 10/15/2021 02:08 PM    POTASSIUM 4.0 03/28/2022 12:32 PM    POTASSIUM 4.4 10/15/2021 02:08 PM    CHLORIDE 100 03/28/2022 12:32 PM    CHLORIDE 95 (L) 10/15/2021 02:08 PM    CO2 26.0 03/28/2022 12:32 PM    CO2 26 10/15/2021 02:08 PM    GLUCOSE 115 03/28/2022 12:32 PM    GLUCOSE 125 (H) 10/15/2021 02:08 PM    BUN 17.0 03/28/2022 12:32 PM    BUN 16 10/15/2021 02:08 PM    CREATININE 1.0 03/28/2022 12:32 PM    CREATININE 0.76 10/15/2021 02:08 PM    BUNCREATRAT 17.0 03/28/2022 12:32 PM    BUNCREATRAT 19 11/02/2016 09:13 AM     Lab Results   Component Value Date/Time    ALKPHOSPHAT 199 (H) 10/15/2021 02:08 PM    ASTSGOT 578 (H) 10/15/2021 02:08 PM    ALTSGPT 483 (H) 10/15/2021 02:08 PM    TBILIRUBIN 1.2 10/15/2021 02:08 PM       Lab Results   Component Value Date/Time    HBA1C 6.3 (A) 03/30/2023 12:00 AM       Lab Results   Component Value Date/Time    MALBCRT see below 12/22/2014 11:25 AM    MICROALBUR <0.5 12/22/2014 11:25 AM    MICRALB 18.1 07/29/2016 09:41 AM       Blood work from [facility] aug 2021:  Na 136, 4.8, ast 50, alt 50, GFR >60  a1c 6.3  Lipase 377  Total c 182, trig 288, hdc 48, ldl 56  Ck normal    Blood work from [facility] August 2022  Hemoglobin 14.9, WBC 6.6  Sodium 136, potassium 4.0, glucose 131, GFR greater than 60, AST 38, ALT 39, alk phos 58  A1c 6.3  Total cholesterol 146, triglycerides 507, HDL 35  Albumin creatinine ratio 5.6    Blood work from [facility] December 2022  Sodium 136, potassium 4.1, glucose 183, creatinine 0.99, GFR 88  A1c 6.8  Total cholesterol 258,  triglycerides 544, HDL 61  LDL direct 133    Blood work from [facility] March 2023  WBC 6.0, hemoglobin 13.1, platelet 292  Sodium 137, potassium 4.8, glucose 127, AST 40, ALT 29  BUN 31, creatinine 1.69, GFR 46  A1c 6.3  LDL 66, HDL 60, triglycerides 332, cholesterol 193  CK 62  Albumin creatinine ratio normal    Blood work from [facility] August 2023  WBC 8.0, hemoglobin 15.9  Sodium 129, potassium 4.6, glucose 275, GFR 62  A1c 10.7  Total cholesterol 273, triglycerides greater than 1100, HDL 42, LDL calculated not performed  Albumin creatinine ratio in the urine less than 8    Blood work from [facility] December 2023  Sodium 137, potassium 5.0, BUN 21, creatinine 1.22, GFR 68,  A1c 6.6  Triglycerides 114, LDL less than 100, HDL has increased significantly    Blood work from [facility] May 2024  Total cholesterol 203, triglycerides 230, HDL 79, LDL 78, non-  Urine for albumin normal  Glucose 130, sodium 138, potassium 5.3  A1c 6.3        Medical Decision Making:  Today's Assessment / Status / Plan:     1. Smoking        2. Type 2 diabetes mellitus without complication, without long-term current use of insulin ([facility])  HEMOGLOBIN A1C    MICROALBUMIN CREAT RATIO URINE      3. Dyslipidemia  Comp Metabolic Panel    Lipid Profile    APOLIPOPROTEIN B    LIPOPROTEIN A      4. Hypertension, unspecified type  Comp Metabolic Panel    MICROALBUMIN CREAT RATIO URINE         Patient Type: Primary Prevention    Etiology of Established CVD if Present: None, but does have 5 of 5 components of metabolic syndrome at baseline    Lipid Management: Qualifies for Statin Therapy Based on 2018 ACC/AHA Guidelines: yes  Calculated 10-Year Risk of ASCVD: N/A  Currently on Statin: Yes  Goal LDL less than 100 and non-HDL less than 130  Tremendously improved control most recent blood work, but LDL a bit above goal  Apolipoprotein B goal less than 90  Unable to obtain prescription strength omega-3 due to formulary  issues in the past  No myalgias and CK normal  Plan:  -Continue atorvastatin 40 mg daily  -Continue fenofibrate for now but could consider discontinuation if triglycerides/non-HDL improved  -Intensify TLC and glucose control as per below  -Recheck fasting lipids with lipoprotein a and apolipoprotein B prior to next visit    Blood Pressure Management:  Acc/aha (2017) Blood Pressure Goal <130/80  Good control at home and in the office  No albuminuria  seemed to have worsening metabolic parameters on hydrochlorothiazide  Cough resolved  Plan:  - continue lisinopril 20mg daily for now but can consider to switch back to ARB in the future if cough returns  -Avoid HCT  - consider amlodipine as next agent if remains above goal  - Continue home blood pressure monitoring  -Call if has any further lightheadedness  -Recheck GFR-and electrolytes and urine for albumin    Glycemic Status: Diabetic  A1c goal less than 7 at least  Much improved control with A1c 6.3  Plan:  - Continue Januvia 100 mg daily  -Continue Synjardy  -Recheck A1c and urine for microalbumin prior to next visit    Anti-Platelet/Anti-Coagulant Tx: no  Not necessarily indicated at present    Smoking: Quit a few months ago  Tolerating Chantix without any affective disorder  No recidivism in over a year  - Recommended complete abstinence from all tobacco products   - Call our office with cravings  -Trial of decreasing Chantix to 1 mg daily but increase back to twice daily if any cravings.  Can consider stopping at next visit if doing well    Physical Activity: Encourage more walking    Weight Management and Nutrition: Continue associated weight loss and decrease in simple carbohydrates    Other:    1.  Choledocholithiasis and continued GI issues -defer further work-up and management to GI  And PCP    2.  KEVIN/Snoring - ongoing. has never had sleep study previously.  He was referred for sleep study but apparently his insurance would not pay for.  Will defer further  work-up and management to PCP    Instructed to follow-up with PCP for remainder of adult medical needs: yes  We will partner with other providers in the management of established vascular disease and cardiometabolic risk factors    Studies to Be Obtained: None  Labs to Be Obtained: As above prior to next visit    Follow up in: 6 mo    Michael Bloch, MD  Vascular Care    CC: Rivas Owens

## 2024-06-07 DIAGNOSIS — E11.9 TYPE 2 DIABETES MELLITUS WITHOUT COMPLICATION, WITHOUT LONG-TERM CURRENT USE OF INSULIN (HCC): ICD-10-CM

## 2024-06-07 DIAGNOSIS — I10 HYPERTENSION, UNSPECIFIED TYPE: ICD-10-CM

## 2024-06-07 DIAGNOSIS — E78.5 DYSLIPIDEMIA: ICD-10-CM

## 2024-06-13 DIAGNOSIS — E11.9 TYPE 2 DIABETES MELLITUS WITHOUT COMPLICATION, WITHOUT LONG-TERM CURRENT USE OF INSULIN (HCC): ICD-10-CM

## 2024-06-17 DIAGNOSIS — E11.9 TYPE 2 DIABETES MELLITUS WITHOUT COMPLICATION, WITHOUT LONG-TERM CURRENT USE OF INSULIN (HCC): ICD-10-CM

## 2024-06-17 RX ORDER — EMPAGLIFLOZIN AND METFORMIN HYDROCHLORIDE 5; 500 MG/1; MG/1
1 TABLET ORAL 2 TIMES DAILY
Qty: 60 TABLET | Refills: 5 | Status: SHIPPED | OUTPATIENT
Start: 2024-06-17 | End: 2024-06-18

## 2024-06-18 RX ORDER — EMPAGLIFLOZIN AND METFORMIN HYDROCHLORIDE 5; 500 MG/1; MG/1
1 TABLET ORAL 2 TIMES DAILY
Qty: 180 TABLET | Refills: 1 | Status: SHIPPED | OUTPATIENT
Start: 2024-06-18

## 2024-06-26 DIAGNOSIS — E11.9 TYPE 2 DIABETES MELLITUS WITHOUT COMPLICATION, WITHOUT LONG-TERM CURRENT USE OF INSULIN (HCC): ICD-10-CM

## 2024-07-03 DIAGNOSIS — F17.200 SMOKING: ICD-10-CM

## 2024-07-03 RX ORDER — VARENICLINE TARTRATE 1 MG/1
1 TABLET, FILM COATED ORAL 2 TIMES DAILY
Qty: 60 TABLET | Refills: 2 | Status: SHIPPED | OUTPATIENT
Start: 2024-07-03

## 2024-09-22 DIAGNOSIS — F17.200 SMOKING: ICD-10-CM

## 2024-09-25 RX ORDER — VARENICLINE TARTRATE 1 MG/1
1 TABLET, FILM COATED ORAL 2 TIMES DAILY
Qty: 60 TABLET | Refills: 2 | Status: SHIPPED | OUTPATIENT
Start: 2024-09-25

## 2024-12-05 ENCOUNTER — OFFICE VISIT (OUTPATIENT)
Dept: VASCULAR LAB | Facility: MEDICAL CENTER | Age: 59
End: 2024-12-05
Payer: MEDICAID

## 2024-12-05 VITALS
HEIGHT: 72 IN | DIASTOLIC BLOOD PRESSURE: 76 MMHG | SYSTOLIC BLOOD PRESSURE: 125 MMHG | BODY MASS INDEX: 23.16 KG/M2 | WEIGHT: 171 LBS | HEART RATE: 80 BPM

## 2024-12-05 DIAGNOSIS — E78.1 HYPERTRIGLYCERIDEMIA: ICD-10-CM

## 2024-12-05 DIAGNOSIS — E11.9 TYPE 2 DIABETES MELLITUS WITHOUT COMPLICATION, WITHOUT LONG-TERM CURRENT USE OF INSULIN (HCC): ICD-10-CM

## 2024-12-05 DIAGNOSIS — E78.5 DYSLIPIDEMIA: ICD-10-CM

## 2024-12-05 DIAGNOSIS — I10 HYPERTENSION, UNSPECIFIED TYPE: ICD-10-CM

## 2024-12-05 PROCEDURE — 99212 OFFICE O/P EST SF 10 MIN: CPT

## 2024-12-05 PROCEDURE — 3074F SYST BP LT 130 MM HG: CPT

## 2024-12-05 PROCEDURE — 99214 OFFICE O/P EST MOD 30 MIN: CPT

## 2024-12-05 PROCEDURE — 3078F DIAST BP <80 MM HG: CPT

## 2024-12-05 RX ORDER — ICOSAPENT ETHYL 1 G/1
2 CAPSULE ORAL 2 TIMES DAILY
Qty: 360 CAPSULE | Refills: 3 | Status: SHIPPED | OUTPATIENT
Start: 2024-12-05

## 2024-12-05 NOTE — PROGRESS NOTES
VASCULAR MEDICINE CLINIC - Follow Up Visit  12/5/2024    Leonard Christensen is a 59 y.o. male who presents today  for vascular f/u    Subjective     HPI:  Patient here for follow up of dyslipidemia, htn, and dm and smoking  No longer smoking, quit over a year ago  Wife still smokes,   No longer taking chantix  Denies etoh  Remains on Synjardy and januvia  Remains on lisinopril without diuretic  FS much improved  BPs at home 110s-120s/70s  No cv symptoms  Good adherence with fenofibrate at [facility]  No myalgias  Some dietary indiscretions recently, which could account for increase in A1c and trigs    DIET AND EXERCISE:  Weight Change: Down 5 or 6 pounds and maintained  Diet: reasonable diabetic diet  Exercise: fairly active       Objective      Objective:     Vitals:    12/05/24 1016   BP: 125/76   BP Location: Left arm   Patient Position: Sitting   BP Cuff Size: Adult   Pulse: 80   Weight: 77.6 kg (171 lb)   Height: 1.829 m (6')     Wt Readings from Last 4 Encounters:   12/05/24 77.6 kg (171 lb)   06/03/24 78.9 kg (174 lb)   12/14/23 74.4 kg (164 lb)   09/12/23 75.3 kg (166 lb)      Physical Exam  Vitals reviewed.   Constitutional:       General: He is not in acute distress.     Appearance: He is not diaphoretic.   HENT:      Head: Normocephalic and atraumatic.      Mouth/Throat:      Mouth: Mucous membranes are dry.   Eyes:      General: No scleral icterus.  Cardiovascular:      Rate and Rhythm: Normal rate and regular rhythm.      Heart sounds: Normal heart sounds. No murmur heard.  Pulmonary:      Effort: Pulmonary effort is normal. No respiratory distress.      Breath sounds: Normal breath sounds. No wheezing or rales.   Musculoskeletal:      Right lower leg: No edema.      Left lower leg: No edema.   Skin:     General: Skin is warm.      Coloration: Skin is not pale.   Neurological:      General: No focal deficit present.      Mental Status: He is alert and oriented to person, place, and time.      Coordination:  Coordination normal.      Gait: Gait is intact. Gait normal.   Psychiatric:         Mood and Affect: Mood and affect normal.         Behavior: Behavior normal.        DATA REVIEW    Lab Results   Component Value Date/Time    CHOLSTRLTOT 123 07/01/2020 06:35 AM    LDL 60 07/01/2020 06:35 AM    HDL 34 (A) 07/01/2020 06:35 AM    TRIGLYCERIDE 146 07/01/2020 06:35 AM       Lab Results   Component Value Date/Time    SODIUM 135 (L) 03/28/2022 12:32 PM    SODIUM 135 10/15/2021 02:08 PM    POTASSIUM 4.0 03/28/2022 12:32 PM    POTASSIUM 4.4 10/15/2021 02:08 PM    CHLORIDE 100 03/28/2022 12:32 PM    CHLORIDE 95 (L) 10/15/2021 02:08 PM    CO2 26.0 03/28/2022 12:32 PM    CO2 26 10/15/2021 02:08 PM    GLUCOSE 115 03/28/2022 12:32 PM    GLUCOSE 125 (H) 10/15/2021 02:08 PM    BUN 17.0 03/28/2022 12:32 PM    BUN 16 10/15/2021 02:08 PM    CREATININE 1.0 03/28/2022 12:32 PM    CREATININE 0.76 10/15/2021 02:08 PM    BUNCREATRAT 17.0 03/28/2022 12:32 PM    BUNCREATRAT 19 11/02/2016 09:13 AM     Lab Results   Component Value Date/Time    ALKPHOSPHAT 199 (H) 10/15/2021 02:08 PM    ASTSGOT 578 (H) 10/15/2021 02:08 PM    ALTSGPT 483 (H) 10/15/2021 02:08 PM    TBILIRUBIN 1.2 10/15/2021 02:08 PM       Lab Results   Component Value Date/Time    HBA1C 6.3 (A) 03/30/2023 12:00 AM       Lab Results   Component Value Date/Time    MALBCRT see below 12/22/2014 11:25 AM    MICROALBUR <0.5 12/22/2014 11:25 AM    MICRALB 18.1 07/29/2016 09:41 AM       Blood work from [facility] aug 2021:  Na 136, 4.8, ast 50, alt 50, GFR >60  a1c 6.3  Lipase 377  Total c 182, trig 288, hdc 48, ldl 56  Ck normal    Blood work from [facility] August 2022  Hemoglobin 14.9, WBC 6.6  Sodium 136, potassium 4.0, glucose 131, GFR greater than 60, AST 38, ALT 39, alk phos 58  A1c 6.3  Total cholesterol 146, triglycerides 507, HDL 35  Albumin creatinine ratio 5.6    Blood work from [facility] December 2022  Sodium 136, potassium 4.1, glucose 183, creatinine 0.99, GFR 88  A1c  6.8  Total cholesterol 258, triglycerides 544, HDL 61  LDL direct 133    Blood work from [facility] March 2023  WBC 6.0, hemoglobin 13.1, platelet 292  Sodium 137, potassium 4.8, glucose 127, AST 40, ALT 29  BUN 31, creatinine 1.69, GFR 46  A1c 6.3  LDL 66, HDL 60, triglycerides 332, cholesterol 193  CK 62  Albumin creatinine ratio normal    Blood work from [facility] August 2023  WBC 8.0, hemoglobin 15.9  Sodium 129, potassium 4.6, glucose 275, GFR 62  A1c 10.7  Total cholesterol 273, triglycerides greater than 1100, HDL 42, LDL calculated not performed  Albumin creatinine ratio in the urine less than 8    Blood work from [facility] December 2023  Sodium 137, potassium 5.0, BUN 21, creatinine 1.22, GFR 68,  A1c 6.6  Triglycerides 114, LDL less than 100, HDL has increased significantly    Blood work from [facility] May 2024  Total cholesterol 203, triglycerides 230, HDL 79, LDL 78, non-  Urine for albumin normal  Glucose 130, sodium 138, potassium 5.3  A1c 6.3        Medical Decision Making:  Today's Assessment / Status / Plan:     1. Dyslipidemia  Lipid Profile    APOLIPOPROTEIN B    LDL, DIRECT    Comp Metabolic Panel    Icosapent Ethyl (VASCEPA) 1 g Cap      2. Hypertension, unspecified type  Comp Metabolic Panel      3. Type 2 diabetes mellitus without complication, without long-term current use of insulin (HCC)        4. Hypertriglyceridemia  Icosapent Ethyl (VASCEPA) 1 g Cap           Patient Type: Primary Prevention    Etiology of Established CVD if Present: None, but does have 5 of 5 components of metabolic syndrome at baseline    Lipid Management: Qualifies for Statin Therapy Based on 2018 ACC/AHA Guidelines: yes  Calculated 10-Year Risk of ASCVD: N/A  Currently on Statin: Yes  Goal LDL less than 100 and non-HDL less than 130  Tremendously improved control most recent blood work, but LDL a bit above goal  Apolipoprotein B goal less than 90  Unable to obtain prescription strength  omega-3 due to formulary issues in the past  No myalgias and CK normal  Did labs at [facility] in Oct, not available at appt today, reported trig increase up >400s  Plan:  -Continue atorvastatin 40 mg daily  -Continue fenofibrate for now but could consider discontinuation if triglycerides/non-HDL improved  - trial starting vascepa - message to pharmacy coordinator to assist with cost/PA.    -Intensify TLC and glucose control as per below  -Recheck fasting lipids with lipoprotein a and apolipoprotein B prior to next visit    Blood Pressure Management:  Acc[place] (2017) Blood Pressure Goal <130/80  Good control at home and in the office  No albuminuria  seemed to have worsening metabolic parameters on hydrochlorothiazide  Cough resolved  Plan:  - continue lisinopril 20mg daily for now but can consider to switch back to ARB in the future if cough returns  -Avoid HCT  - consider amlodipine as next agent if remains above goal  - Continue home blood pressure monitoring  -Call if has any further lightheadedness  -Recheck GFR-and electrolytes and urine for albumin    Glycemic Status: Diabetic  A1c goal less than 7 at least  Much improved control with A1c 6.3 on previous labs, reported increase on recent labs of 6.8 10/2024.  Likely due to dietary indiscretions.  Long discussion today on diet and lifestyle and limiting snack type foods.   Plan:  - Continue Januvia 100 mg daily  -Continue Synjardy  -Recheck A1c and urine for microalbumin prior to next visit    Anti-Platelet/Anti-Coagulant Tx: no  Not necessarily indicated at present    Smoking: Quit a few months ago  Tolerating Chantix without any affective disorder  No recidivism in over a year  - Recommended complete abstinence from all tobacco products   - Call our office with cravings  -Trial of decreasing Chantix to 1 mg daily but increase back to twice daily if any cravings.  Can consider stopping at next visit if doing well    Physical Activity: Encourage more  walking    Weight Management and Nutrition: Continue associated weight loss and decrease in simple carbohydrates    Other:    1.  Choledocholithiasis and continued GI issues -defer further work-up and management to GI  And PCP    2.  KEVIN/Snoring - ongoing. has never had sleep study previously.  He was referred for sleep study but apparently his insurance would not pay for.  Will defer further work-up and management to PCP    Instructed to follow-up with PCP for remainder of adult medical needs: yes  We will partner with other providers in the management of established vascular disease and cardiometabolic risk factors    Studies to Be Obtained: None  Labs to Be Obtained: As above prior to next visit    Follow up in: 6 mo    ERIN Castano.  [facility]    CC: Rivas Owens

## 2024-12-11 ENCOUNTER — TELEPHONE (OUTPATIENT)
Dept: VASCULAR LAB | Facility: MEDICAL CENTER | Age: 59
End: 2024-12-11
Payer: MEDICAID

## 2024-12-11 NOTE — TELEPHONE ENCOUNTER
PA for VASCEPA (ICOSAPENT ETHYL) 1G CAPSULES  has been approved for a quantity of 360 , day supply 90    PA reference number: [id number]  Insurance: [place]ICA[employer]  Effective dates: 12/11/2024-12/11/2025  Copay: $0/30DS     Is patient eligible to fill with [facility]? Yes    Next Steps: The Patients copay is less than $5.00. Will contact the patient to determine choice of pharmacy, if applicable.    PRAVIN Baig, PhT  Vascular Pharmacy Liaison (Rx Coordinator)  P: [phone]  12/11/2024 11:41 AM

## 2024-12-11 NOTE — TELEPHONE ENCOUNTER
Prior Authorization for VASCEPA (ICOSAPENT ETHYL) 1G CAPSULES  (Quantity: 360, Days: 90) has been submitted via Cover My Meds: Key ([id number])    Insurance: [employer]    Will follow up in 24-48 business hours.     PRAVIN Baig, PhT  Vascular Pharmacy Liaison (Rx Coordinator)  P: [phone]  12/11/2024 11:38 AM

## 2024-12-11 NOTE — TELEPHONE ENCOUNTER
Received New start PA request via  EMR    for VASCEPA (ICOSAPENT ETHYL) 1G CAPSULES. (Quantity:360, Day Supply:90)     Insurance: [facility] MEDICAID  Member ID:  [id number]  BIN: [id number]  PCN: [id number]  Group: [id number]     Ran Test claim via [place] & medication Rejects stating prior authorization is required.    PRAVIN Baig, PhT  Vascular Pharmacy Liaison (Rx Coordinator)  P: [phone]  12/11/2024 11:35 AM

## 2024-12-16 NOTE — TELEPHONE ENCOUNTER
Medication:  Icosapent Ethyl (VASCEPA) 1 g Cap     Called & spoke with Leonard, to offer specialty services & patient declined.  Patient requested for prescription get sent to [facility]    Prescription will be released to pt's preferred pharmacy: [id number]    Thank you,   Ta Ledesma, [facility]  Pharmacy Liaison

## 2024-12-27 DIAGNOSIS — E11.9 TYPE 2 DIABETES MELLITUS WITHOUT COMPLICATION, WITHOUT LONG-TERM CURRENT USE OF INSULIN (HCC): ICD-10-CM

## 2024-12-27 RX ORDER — EMPAGLIFLOZIN AND METFORMIN HYDROCHLORIDE 5; 500 MG/1; MG/1
1 TABLET ORAL 2 TIMES DAILY
Qty: 200 TABLET | Refills: 1 | Status: SHIPPED | OUTPATIENT
Start: 2024-12-27

## 2025-04-14 ENCOUNTER — TELEPHONE (OUTPATIENT)
Dept: VASCULAR LAB | Facility: MEDICAL CENTER | Age: 60
End: 2025-04-14
Payer: MEDICAID

## 2025-04-14 DIAGNOSIS — E11.9 TYPE 2 DIABETES MELLITUS WITHOUT COMPLICATION, WITHOUT LONG-TERM CURRENT USE OF INSULIN (HCC): ICD-10-CM

## 2025-04-14 RX ORDER — EMPAGLIFLOZIN AND METFORMIN HYDROCHLORIDE 5; 500 MG/1; MG/1
1 TABLET ORAL 2 TIMES DAILY
Qty: 200 TABLET | Refills: 3 | Status: SHIPPED | OUTPATIENT
Start: 2025-04-14

## 2025-04-14 NOTE — TELEPHONE ENCOUNTER
Called and s/w patient regarding refill for Synjardy  SYNJARDY 5-500 MG Tab    Gave him the option to refill with either [facility] or   [facility]. (Per Herb Goetz Rx Coordinator, they do have rx available at pharmacy.)   Patient chose to refill at [facility], and I will submit refill appropriately. Rx coordinator will reach out to patient to discuss.    If patient wants to discuss alternative medication, advised to discuss with Vascular APRN at next f/u.    Kamla Rose, Med Ass't  [facility]  Ph. [phone]  Fx. [phone]

## 2025-04-14 NOTE — TELEPHONE ENCOUNTER
[facility] MED    Caller: Leonard Beltran [place]    Topic/issue: MEDICATION MANAGEMENT     Syed states that he would like to know if he could get a refill of his SYNJARDY and also if there could be an alternative medication. He states that he is tired of running out of his meds every month and not getting refills for a week or so due to the shipment of the med from another state. He would like to know if there is a medication that is in town and that he can  from the pharmacy directly. Please advise.    Thank you,  Linwood MARKHAM    Callback Number: [phone] (home)

## 2025-04-14 NOTE — TELEPHONE ENCOUNTER
Received request via: Patient  See tele [facility] 4/14.     Was the patient seen in the last year in this department? Yes    Does the patient have an active prescription (recently filled or refills available) for medication(s) requested? No    Pharmacy Name: [facility]    Does the patient have [facility] and need 100-day supply? (This applies to ALL medications) Patient does not have SCP    Kamla Rose, Med Ass't  [facility]  Ph. [phone]  Fx. [phone]

## 2025-04-15 ENCOUNTER — TELEPHONE (OUTPATIENT)
Dept: VASCULAR LAB | Facility: MEDICAL CENTER | Age: 60
End: 2025-04-15
Payer: MEDICAID

## 2025-04-15 NOTE — TELEPHONE ENCOUNTER
Received Refill request via [facility]  for Synjardy 5-500mg Tablet. (Quantity:180, Day Supply:90)     Insurance: [place] Medicaid  Member ID:  [id number]  BIN: [id number]  PCN: [id number]  Group: [id number]     Ran Test claim via [place] & medication Rejects stating prior authorization is required.    Thank you,  Kori Goetz  Pharmacy Liaison  [facility]  [phone]  4/15/2025 10:54 AM

## 2025-04-15 NOTE — TELEPHONE ENCOUNTER
Prior Authorization for Synjardy 5-500mg Tablet (Quantity: 180, Days: 90) has been submitted via Cover My Meds: Key ([id number])    Insurance: [employer]    Will follow up in 24-48 business hours.     Thank you,  Kori Goetz  Pharmacy Liaison  [facility]  [phone]  4/15/2025 10:58 AM

## 2025-04-15 NOTE — TELEPHONE ENCOUNTER
Prior Authorization for Synjardy 5-500mg Tablet has been approved for a quantity of 60 , day supply 30    Prior Authorization reference number: [id number]  Insurance: [place]  Effective dates: 4/15/25--4/15/26  Copay: $0     Is patient eligible to fill with [facility]? Yes    Next Steps:  pt would like medication filled at preferred pharmacy: [facility]. Releasing to pt's preferred pharmacy.    Thank you,  Kori Goetz  Pharmacy Liaison  [facility] and Vascular [facility]  [phone]  4/15/2025 11:28 AM

## 2025-04-15 NOTE — TELEPHONE ENCOUNTER
Caller: Leonard Christensen     Topic/issue: Patient is wanting to know when the medication will be sent to the pharmacy for SYNJARDY. Advised the medication has not been released yet. He is concerned and needs to pick it up today    Callback Number: [phone]     Thank You  Nadine LANDAVERDE

## 2025-04-17 NOTE — TELEPHONE ENCOUNTER
Received forwarded EMR that pt expressed concerns that he haven't received his script yet for his synjardy.     Called [facility] pharmacy @ [phone] and s/w LUCI Alonzo to obtain fill status. Per Cheri, medication for Synjardy has been ready since 04/15.     Called and spoke to pt today in regards to the status obtained from the pharmacy. Pt verbally understood and will be picking it up today.     PRAVIN Baig, PhT  Vascular Pharmacy Liaison (Rx Coordinator)  P: [phone]  4/17/2025 11:23 AM

## 2025-04-17 NOTE — TELEPHONE ENCOUNTER
Caller: Leonard Beltran [place]    Topic/issue: Patient called about the medication   Empagliflozin-metFORMIN HCl (SYNJARDY) 5-500 MG Tab because he spoke with [facility] and they did not receive the prescription. He also said that when he last spoke with them they told him they did not have enough supply to fill the medication. He is inquiring if he needs to get this from the [facility].    Please advise.    Callback Number: [phone]    Thank you,     Marce YING

## 2025-05-22 LAB — HBA1C MFR BLD: 7.5 % (ref ?–5.8)

## 2025-05-23 DIAGNOSIS — E78.5 DYSLIPIDEMIA: ICD-10-CM

## 2025-05-23 DIAGNOSIS — I10 HYPERTENSION, UNSPECIFIED TYPE: ICD-10-CM

## 2025-05-23 DIAGNOSIS — E11.9 TYPE 2 DIABETES MELLITUS WITHOUT COMPLICATION, WITHOUT LONG-TERM CURRENT USE OF INSULIN (HCC): ICD-10-CM

## 2025-05-27 ENCOUNTER — OFFICE VISIT (OUTPATIENT)
Dept: VASCULAR LAB | Facility: MEDICAL CENTER | Age: 60
End: 2025-05-27
Payer: MEDICAID

## 2025-05-27 VITALS
WEIGHT: 172 LBS | HEART RATE: 86 BPM | HEIGHT: 72 IN | BODY MASS INDEX: 23.3 KG/M2 | SYSTOLIC BLOOD PRESSURE: 121 MMHG | DIASTOLIC BLOOD PRESSURE: 78 MMHG

## 2025-05-27 DIAGNOSIS — E11.9 TYPE 2 DIABETES MELLITUS WITHOUT COMPLICATION, WITHOUT LONG-TERM CURRENT USE OF INSULIN (HCC): Primary | ICD-10-CM

## 2025-05-27 DIAGNOSIS — E88.810 METABOLIC SYNDROME: ICD-10-CM

## 2025-05-27 DIAGNOSIS — I10 HYPERTENSION, UNSPECIFIED TYPE: ICD-10-CM

## 2025-05-27 DIAGNOSIS — E78.5 DYSLIPIDEMIA: ICD-10-CM

## 2025-05-27 DIAGNOSIS — E78.1 HYPERTRIGLYCERIDEMIA: ICD-10-CM

## 2025-05-27 PROCEDURE — 99214 OFFICE O/P EST MOD 30 MIN: CPT

## 2025-05-27 PROCEDURE — 99212 OFFICE O/P EST SF 10 MIN: CPT

## 2025-05-27 PROCEDURE — 3074F SYST BP LT 130 MM HG: CPT

## 2025-05-27 PROCEDURE — 3078F DIAST BP <80 MM HG: CPT

## 2025-05-27 RX ORDER — METFORMIN HYDROCHLORIDE 500 MG/1
1000 TABLET, EXTENDED RELEASE ORAL 2 TIMES DAILY
Qty: 400 TABLET | Refills: 3 | Status: SHIPPED | OUTPATIENT
Start: 2025-05-27

## 2025-05-27 RX ORDER — DAPAGLIFLOZIN 10 MG/1
10 TABLET, FILM COATED ORAL DAILY
Qty: 100 TABLET | Refills: 3 | Status: SHIPPED | OUTPATIENT
Start: 2025-05-27 | End: 2026-07-01

## 2025-05-27 RX ORDER — COLESTIPOL HYDROCHLORIDE 1 G/1
1 TABLET ORAL 2 TIMES DAILY
Qty: 180 TABLET | Refills: 3 | Status: SHIPPED | OUTPATIENT
Start: 2025-05-27

## 2025-05-27 NOTE — PROGRESS NOTES
VASCULAR MEDICINE CLINIC - Follow Up Visit  12/5/2024    Leonard Christensen is a 59 y.o. male who presents today  for vascular f/u    Subjective     HPI:  Patient here for follow up of dyslipidemia, htn, and dm and smoking  Last seen 12/2024  Doing well,   Wondering about changing syngardy - pharmacy has to special order it and he is out of it for 1-2 weeks every month.  And its expensive  No smoking - wife continues to smoke  denies etoh  Remains on Synjardy and januvia  on lisinopril without diuretic  BPs at home 110s-120s/70s  No cv symptoms  Good adherence with fenofibrate, atorva, and vascepa  Reports some mild myalgias after addition of vascepa  Some dietary indiscretions recently, which could account for increase in A1c and trigs  Did labs at [facility] - reviewed     DIET AND EXERCISE:  Weight Change: Down a fe pounds and maintained  Diet: reasonable diabetic diet  Exercise: fairly active       Objective      Objective:     Vitals:    05/27/25 1011   BP: 121/78   Pulse: 86   Weight: 78 kg (172 lb)   Height: 1.829 m (6')     Wt Readings from Last 4 Encounters:   05/27/25 78 kg (172 lb)   12/05/24 77.6 kg (171 lb)   06/03/24 78.9 kg (174 lb)   12/14/23 74.4 kg (164 lb)      Physical Exam  Vitals reviewed.   Constitutional:       General: He is not in acute distress.     Appearance: He is not diaphoretic.   HENT:      Head: Normocephalic and atraumatic.      Mouth/Throat:      Mouth: Mucous membranes are dry.   Eyes:      General: No scleral icterus.  Cardiovascular:      Rate and Rhythm: Normal rate and regular rhythm.      Heart sounds: Normal heart sounds. No murmur heard.  Pulmonary:      Effort: Pulmonary effort is normal. No respiratory distress.      Breath sounds: Normal breath sounds. No wheezing or rales.   Musculoskeletal:      Right lower leg: No edema.      Left lower leg: No edema.   Skin:     General: Skin is warm.      Coloration: Skin is not pale.   Neurological:      General: No focal deficit  present.      Mental Status: He is alert and oriented to person, place, and time.      Coordination: Coordination normal.      Gait: Gait is intact. Gait normal.   Psychiatric:         Mood and Affect: Mood and affect normal.         Behavior: Behavior normal.        DATA REVIEW    Lab Results   Component Value Date/Time    CHOLSTRLTOT 123 07/01/2020 06:35 AM    LDL 60 07/01/2020 06:35 AM    HDL 34 (A) 07/01/2020 06:35 AM    TRIGLYCERIDE 146 07/01/2020 06:35 AM       Lab Results   Component Value Date/Time    SODIUM 135 (L) 03/28/2022 12:32 PM    SODIUM 135 10/15/2021 02:08 PM    POTASSIUM 4.0 03/28/2022 12:32 PM    POTASSIUM 4.4 10/15/2021 02:08 PM    CHLORIDE 100 03/28/2022 12:32 PM    CHLORIDE 95 (L) 10/15/2021 02:08 PM    CO2 26.0 03/28/2022 12:32 PM    CO2 26 10/15/2021 02:08 PM    GLUCOSE 115 03/28/2022 12:32 PM    GLUCOSE 125 (H) 10/15/2021 02:08 PM    BUN 17.0 03/28/2022 12:32 PM    BUN 16 10/15/2021 02:08 PM    CREATININE 1.0 03/28/2022 12:32 PM    CREATININE 0.76 10/15/2021 02:08 PM    BUNCREATRAT 17.0 03/28/2022 12:32 PM    BUNCREATRAT 19 11/02/2016 09:13 AM     Lab Results   Component Value Date/Time    ALKPHOSPHAT 199 (H) 10/15/2021 02:08 PM    ASTSGOT 578 (H) 10/15/2021 02:08 PM    ALTSGPT 483 (H) 10/15/2021 02:08 PM    TBILIRUBIN 1.2 10/15/2021 02:08 PM       Lab Results   Component Value Date/Time    HBA1C 7.5 (A) 05/22/2025 12:00 AM       Lab Results   Component Value Date/Time    MALBCRT see below 12/22/2014 11:25 AM    MICROALBUR <0.5 12/22/2014 11:25 AM    MICRALB 18.1 07/29/2016 09:41 AM       Blood work from [facility] aug 2021:  Na 136, 4.8, ast 50, alt 50, GFR >60  a1c 6.3  Lipase 377  Total c 182, trig 288, hdc 48, ldl 56  Ck normal    Blood work from [facility] August 2022  Hemoglobin 14.9, WBC 6.6  Sodium 136, potassium 4.0, glucose 131, GFR greater than 60, AST 38, ALT 39, alk phos 58  A1c 6.3  Total cholesterol 146, triglycerides 507, HDL 35  Albumin creatinine ratio 5.6    Blood work  from [facility] December 2022  Sodium 136, potassium 4.1, glucose 183, creatinine 0.99, GFR 88  A1c 6.8  Total cholesterol 258, triglycerides 544, HDL 61  LDL direct 133    Blood work from [facility] March 2023  WBC 6.0, hemoglobin 13.1, platelet 292  Sodium 137, potassium 4.8, glucose 127, AST 40, ALT 29  BUN 31, creatinine 1.69, GFR 46  A1c 6.3  LDL 66, HDL 60, triglycerides 332, cholesterol 193  CK 62  Albumin creatinine ratio normal    Blood work from [facility] August 2023  WBC 8.0, hemoglobin 15.9  Sodium 129, potassium 4.6, glucose 275, GFR 62  A1c 10.7  Total cholesterol 273, triglycerides greater than 1100, HDL 42, LDL calculated not performed  Albumin creatinine ratio in the urine less than 8    Blood work from [facility] December 2023  Sodium 137, potassium 5.0, BUN 21, creatinine 1.22, GFR 68,  A1c 6.6  Triglycerides 114, LDL less than 100, HDL has increased significantly    Blood work from [facility] May 2024  Total cholesterol 203, triglycerides 230, HDL 79, LDL 78, non-  Urine for albumin normal  Glucose 130, sodium 138, potassium 5.3  A1c 6.3    Blood work from [facility] 5/2025  Total cholesterol 238, , HDL 51, LDL direct 127, nonHDL 187  A1c 7.5  Glucose 144, sodium 134, potassium 4.3, creatinine 1.45, GFR 55  Urine for albumin wnl          Medical Decision Making:  Today's Assessment / Status / Plan:     1. Type 2 diabetes mellitus without complication, without long-term current use of insulin (HCC)  metFORMIN ER (GLUCOPHAGE XR) 500 MG TABLET SR 24 HR    dapagliflozin propanediol (FARXIGA) 10 MG Tab    Comp Metabolic Panel    HEMOGLOBIN A1C (Glycohemoglobin GHB Total/A1C with MBG Estimate)      2. Dyslipidemia  colestipol (COLESTID) 1 GM Tab    Lipid Profile    Comp Metabolic Panel    APOLIPOPROTEIN B    Lipoprotein (a)      3. Hypertriglyceridemia  colestipol (COLESTID) 1 GM Tab    LDL, DIRECT      4. Hypertension, unspecified type  Comp Metabolic Panel      5.  Metabolic syndrome               Patient Type: Primary Prevention    Etiology of Established CVD if Present: None, but does have 5 of 5 components of metabolic syndrome at baseline    Lipid Management: Qualifies for Statin Therapy Based on 2018 ACC/AHA Guidelines: yes  Calculated 10-Year Risk of ASCVD: N/A  Currently on Statin: Yes  Goal LDL less than 100 and non-HDL less than 130  Apolipoprotein B goal less than 90  Unable to obtain prescription strength omega-3 due to formulary issues in the past - appears affordable and tolerating at this time, does note some mild myalgias   No myalgias and CK normal  Did labs at [facility] in Oct, not available at appt previously, reported trig increase up >400s  Remains above goal on most recent labs 5/2025, and TG remain  >500  No improvement noted with addition of vascepa  Pt reports lipid panel much better controlled previously when on colestipol, is willing to retrial.    Plan:  -Continue atorvastatin 40 mg daily  -Continue fenofibrate for now - may need to stop if kidney function continues to worsen - update labs   - continue vascepa  -  for now, cost may be an issue for compliance in future  - start colestipol     -Intensify TLC and glucose control as per below  -Recheck fasting lipids with lipoprotein a and apolipoprotein B prior to next visit    Blood Pressure Management:  [facility] (2017) Blood Pressure Goal <130/80  Good control at home and in the office  No albuminuria  seemed to have worsening metabolic parameters on hydrochlorothiazide  Cough resolved  Plan:  - continue lisinopril 20mg daily for now but can consider to switch back to ARB in the future if cough returns  -Avoid HCT  - consider amlodipine as next agent if remains above goal  - Continue home blood pressure monitoring  -Call if has any further lightheadedness  -Recheck GFR-and electrolytes and urine for albumin    Glycemic Status: Diabetic  Goal A1c < 7  Lab Results   Component Value Date/Time    HBA1C 7.5 (A)  05/22/2025 12:00 AM    HBA1C 6.3 (A) 05/20/2024 12:00 AM    HBA1C 6.3 (A) 03/30/2023 12:00 AM       Lab Results   Component Value Date/Time    MALBCRT see below 12/22/2014 11:25 AM    MICROALBUR <0.5 12/22/2014 11:25 AM    MICRALB 18.1 07/29/2016 09:41 AM   Again noted increase in A1c -   Likely due to dietary indiscretions and inconsistency with getting medications from pharmacy   Reports synjardy is difficult to get from pharmacy (has to special order) and is expensive, wondering if can change to med on formulary    Again had long discussion today on diet and lifestyle and limiting snack type foods.   Plan:  - Continue Januvia 100 mg daily  - STop Synjardy due to formulary   - Start farxiga 10mg daily  - increase metformin SR 1000mg BID   -Recheck A1c prior to next visit  - encourage pt to follow up with PCP as well   - recommmend for routine care with PCP (or endocrine) to include regular A1c monitoring, annual albumin/creatinine ratio (ACR), annual diabetic retinopathy screening, foot exams, annual flu vaccine, and updates to pneumonia vaccines as appropriate     Anti-Platelet/Anti-Coagulant Tx: no  Not necessarily indicated at present    Smoking: Quit a 1+year ago   Tolerating Chantix without any affective disorder  No recidivism in over a year  - Recommended complete abstinence from all tobacco products   - Call our office with cravings  -Trial of decreasing Chantix to 1 mg daily but increase back to twice daily if any cravings.  Can consider stopping at next visit if doing well    Physical Activity: Encourage more walking    Weight Management and Nutrition: Continue associated weight loss and decrease in simple carbohydrates    Other:    1.  Choledocholithiasis and continued GI issues -defer further work-up and management to GI  And PCP    2.  KEVIN/Snoring - ongoing. has never had sleep study previously.  He was referred for sleep study but apparently his insurance would not pay for.  Will defer further  work-up and management to PCP    Instructed to follow-up with PCP for remainder of adult medical needs: yes  We will partner with other providers in the management of established vascular disease and cardiometabolic risk factors    Studies to Be Obtained: None  Labs to Be Obtained: As above prior to next visit    Follow up in: 3-4 mo with Dr. Bloch Frances G. Meyer, A.P.R.N.  [facility]    CC: Rivas Owens

## 2025-05-30 ENCOUNTER — TELEPHONE (OUTPATIENT)
Dept: VASCULAR LAB | Facility: MEDICAL CENTER | Age: 60
End: 2025-05-30
Payer: MEDICAID

## 2025-05-30 NOTE — TELEPHONE ENCOUNTER
Caller:  Syed Christensen    Topic/issue:   Syed would like a call to go over  his medications, as he is confused as some have been changed.     Callback Number:   [phone]    Thank you,   Macy OREILLY

## 2025-05-30 NOTE — TELEPHONE ENCOUNTER
Pt called pharmacy has not received farxiga yet. Asking for it to be sent over. Message sent to pharm coordinators.

## 2025-05-31 NOTE — TELEPHONE ENCOUNTER
Received New Start request via [employer]  for dapagliflozin propanediol (FARXIGA) 10 MG Tab . (Quantity:30, Day Supply:30)     Insurance: [facility] MEDICAID  Member ID:  [id number]  BIN: [id number]  PCN: [id number]  Group: [id number]     Ran Test claim via [employer] & medication pays for $0 copay. Will release Rx to pharmacy on file ([facility] pharmacy), and will call to confirm paid claim process, to initiate DUR codes.     Called [facility] pharmacy @ [phone] and s/w Ghislaine Magana and confirmed that they haven't received the new script. Will give them a call at a later time.    S/w pt today about the status and pt verbally understood.     PRAVIN Baig, PhT  Vascular Pharmacy Liaison (Rx Coordinator)  P: [phone]  5/30/2025 5:30 PM

## 2025-05-31 NOTE — TELEPHONE ENCOUNTER
[facility] pharmacy called me back from [phone]  and s/w Leah PhT to confirm fill status. Per Leah, she was able to get a paid claim of $0 copay.     Called and s/w pt today about the fill status, and pt verbally understood.     No further actions given.     PRAVIN Baig, PhT  Vascular Pharmacy Liaison (Rx Coordinator)  P: [phone]  5/30/2025 6:11 PM

## 2025-07-09 DIAGNOSIS — E11.9 TYPE 2 DIABETES MELLITUS WITHOUT COMPLICATION, WITHOUT LONG-TERM CURRENT USE OF INSULIN (HCC): ICD-10-CM

## 2025-07-10 RX ORDER — SITAGLIPTIN 100 MG/1
100 TABLET, FILM COATED ORAL
Qty: 100 TABLET | Refills: 3 | Status: SHIPPED | OUTPATIENT
Start: 2025-07-10

## (undated) DEVICE — CANISTER SUCTION RIGID RED 1500CC (40EA/CA)

## (undated) DEVICE — BITE BLOCK ADULT 60FR (100EA/CA)

## (undated) DEVICE — SYRINGE DISP. 60 CC LL - (30/BX, 12BX/CA)**WHEN THESE ARE GONE ORDER #500206**

## (undated) DEVICE — SYRINGE SAFETY 3 ML 18 GA X 1 1/2 BLUNT LL (100/BX 8BX/CA)

## (undated) DEVICE — SPHINCTERTOME TRUETOME 44 20MM PRELOAD JAG 035IN

## (undated) DEVICE — SENSOR SPO2 ADULT LNCS ADTX (20/BX) ORDER ITEM #19593

## (undated) DEVICE — MASK WITH FACE SHIELD (25/BX 4BX/CA)

## (undated) DEVICE — KIT CUSTOM PROCEDURE SINGLE FOR ENDO  (15/CA)

## (undated) DEVICE — TUBING CLEARLINK DUO-VENT - C-FLO (48EA/CA)

## (undated) DEVICE — SPONGE GAUZE NON-STERILE 4X4 - (2000/CA 10PK/CA)

## (undated) DEVICE — SYRINGE SAFETY 5 ML 18 GA X 1-1/2 BLUNT LL (100/BX 4BX/CA)

## (undated) DEVICE — EXTRACTOR PRO XL 9-12 MM ABOVE

## (undated) DEVICE — LACTATED RINGERS INJ 1000 ML - (14EA/CA 60CA/PF)

## (undated) DEVICE — Device

## (undated) DEVICE — MASK ANESTHESIA ADULT  - (100/CA)

## (undated) DEVICE — SET EXTENSION WITH 2 PORTS (48EA/CA) ***PART #2C8610 IS A SUBSTITUTE*****

## (undated) DEVICE — TUBE E-T HI-LO CUFF 7.5MM (10EA/PK)

## (undated) DEVICE — TUBE E-T HI-LO CUFF 8.0MM (10EA/PK)

## (undated) DEVICE — SYRINGE 12 CC LUER TIP - (80/BX) OBSOLETE ITEM

## (undated) DEVICE — GOWN SURGEONS LARGE - (32/CA)

## (undated) DEVICE — KIT  I.V. START (100EA/CA)

## (undated) DEVICE — NEPTUNE 4 PORT MANIFOLD - (20/PK)

## (undated) DEVICE — FORCEP RADIAL JAW 4 STANDARD CAPACITY W/NEEDLE 240CM (40EA/BX)

## (undated) DEVICE — KIT ANESTHESIA W/CIRCUIT & 3/LT BAG W/FILTER (20EA/CA)

## (undated) DEVICE — SYRINGE SAFETY 10 ML 18 GA X 1 1/2 BLUNT LL (100/BX 4BX/CA)

## (undated) DEVICE — ELECTRODE DUAL RETURN W/ CORD - (50/PK)

## (undated) DEVICE — TUBE E-T HI-LO CUFF 7.0MM (10EA/PK)

## (undated) DEVICE — BLOCK BITE ENDOSCOPIC 2809 - (100/BX) INTERMEDIATE

## (undated) DEVICE — CATHETER IV SAFETY 20 GA X 1-1/4 (50/BX)

## (undated) DEVICE — GLOVE, LITE (PAIR)

## (undated) DEVICE — CATHETER IV SAFETY 22 GA X 1 (50EA/BX)

## (undated) DEVICE — BALLOON CRE WG 10-12MM 240CM 5.5 F G

## (undated) DEVICE — TUBE SUCTION YANKAUER  1/4 X 6FT (20EA/CA)"

## (undated) DEVICE — ELECTRODE 850 FOAM ADHESIVE - HYDROGEL RADIOTRNSPRNT (50/PK)

## (undated) DEVICE — TUBE E-T HI-LO CUFF 8.5MM (10EA/PK)

## (undated) DEVICE — TUBE CONNECTING SUCTION - CLEAR PLASTIC STERILE 72 IN (50EA/CA)

## (undated) DEVICE — TUBE E-T HI-LO CUFF 6.5MM (10EA/BX)

## (undated) DEVICE — WATER IRRIGATION STERILE 1000ML (12EA/CA)

## (undated) DEVICE — DRAPE X LONG COILED INSTRUMENT ORGANIZER EUS (20EA/BX)